# Patient Record
Sex: MALE | Race: WHITE | NOT HISPANIC OR LATINO | URBAN - METROPOLITAN AREA
[De-identification: names, ages, dates, MRNs, and addresses within clinical notes are randomized per-mention and may not be internally consistent; named-entity substitution may affect disease eponyms.]

---

## 2017-12-15 ENCOUNTER — HOSPITAL ENCOUNTER (OUTPATIENT)
Dept: FAMILY MEDICINE CLINIC | Facility: CLINIC | Age: 67
Setting detail: SPECIMEN
Discharge: HOME | End: 2017-12-15
Attending: INTERNAL MEDICINE | Admitting: INTERNAL MEDICINE

## 2017-12-15 LAB
ALBUMIN SERPL-MCNC: 4 G/DL (ref 3.5–4.8)
ALBUMIN/GLOB SERPL: 1.3 {RATIO} (ref 1–1.7)
ALP SERPL-CCNC: 93 IU/L (ref 32–91)
ALT SERPL-CCNC: 19 IU/L (ref 17–63)
ANION GAP SERPL CALC-SCNC: 12.6 MMOL/L (ref 10–20)
AST SERPL-CCNC: 25 IU/L (ref 15–41)
BASOPHILS # BLD AUTO: 0 10*3/UL (ref 0–0.2)
BASOPHILS NFR BLD AUTO: 1 % (ref 0–2)
BILIRUB SERPL-MCNC: 0.6 MG/DL (ref 0.3–1.2)
BUN SERPL-MCNC: 16 MG/DL (ref 8–20)
BUN/CREAT SERPL: 12.3 (ref 6.2–20.3)
CALCIUM SERPL-MCNC: 9.7 MG/DL (ref 8.9–10.3)
CHLORIDE SERPL-SCNC: 98 MMOL/L (ref 101–111)
CHOLEST SERPL-MCNC: 201 MG/DL
CHOLEST/HDLC SERPL: 4.1 {RATIO}
CONV CO2: 29 MMOL/L (ref 22–32)
CONV LDL CHOLESTEROL DIRECT: 137 MG/DL (ref 0–100)
CONV TOTAL PROTEIN: 7.1 G/DL (ref 6.1–7.9)
CREAT UR-MCNC: 1.3 MG/DL (ref 0.7–1.2)
DIFFERENTIAL METHOD BLD: (no result)
EOSINOPHIL # BLD AUTO: 0.1 10*3/UL (ref 0–0.3)
EOSINOPHIL # BLD AUTO: 1 % (ref 0–3)
ERYTHROCYTE [DISTWIDTH] IN BLOOD BY AUTOMATED COUNT: 13.4 % (ref 11.5–14.5)
GLOBULIN UR ELPH-MCNC: 3.1 G/DL (ref 2.5–3.8)
GLUCOSE SERPL-MCNC: 92 MG/DL (ref 65–99)
HCT VFR BLD AUTO: 48.9 % (ref 40–54)
HDLC SERPL-MCNC: 49 MG/DL
HGB BLD-MCNC: 16.4 G/DL (ref 14–18)
LDLC/HDLC SERPL: 2.8 {RATIO}
LIPID INTERPRETATION: ABNORMAL
LYMPHOCYTES # BLD AUTO: 0.8 10*3/UL (ref 0.8–4.8)
LYMPHOCYTES NFR BLD AUTO: 9 % (ref 18–42)
MCH RBC QN AUTO: 32.2 PG (ref 26–32)
MCHC RBC AUTO-ENTMCNC: 33.6 G/DL (ref 32–36)
MCV RBC AUTO: 95.7 FL (ref 80–94)
MONOCYTES # BLD AUTO: 0.5 10*3/UL (ref 0.1–1.3)
MONOCYTES NFR BLD AUTO: 6 % (ref 2–11)
NEUTROPHILS # BLD AUTO: 7.1 10*3/UL (ref 2.3–8.6)
NEUTROPHILS NFR BLD AUTO: 83 % (ref 50–75)
NRBC BLD AUTO-RTO: 0 /100{WBCS}
NRBC/RBC NFR BLD MANUAL: 0 10*3/UL
PLATELET # BLD AUTO: 214 10*3/UL (ref 150–450)
PMV BLD AUTO: 8.1 FL (ref 7.4–10.4)
POTASSIUM SERPL-SCNC: 4.6 MMOL/L (ref 3.6–5.1)
RBC # BLD AUTO: 5.1 10*6/UL (ref 4.6–6)
SODIUM SERPL-SCNC: 135 MMOL/L (ref 136–144)
TRIGL SERPL-MCNC: 87 MG/DL
TSH SERPL-ACNC: 0.5 UIU/ML (ref 0.34–5.6)
VLDLC SERPL CALC-MCNC: 15.5 MG/DL
WBC # BLD AUTO: 8.5 10*3/UL (ref 4.5–11.5)

## 2018-08-16 ENCOUNTER — ON CAMPUS - OUTPATIENT (AMBULATORY)
Dept: URBAN - METROPOLITAN AREA HOSPITAL 2 | Facility: HOSPITAL | Age: 68
End: 2018-08-16
Payer: COMMERCIAL

## 2018-08-16 ENCOUNTER — OFFICE (AMBULATORY)
Dept: URBAN - METROPOLITAN AREA PATHOLOGY 4 | Facility: PATHOLOGY | Age: 68
End: 2018-08-16
Payer: COMMERCIAL

## 2018-08-16 VITALS
HEART RATE: 61 BPM | SYSTOLIC BLOOD PRESSURE: 93 MMHG | OXYGEN SATURATION: 98 % | RESPIRATION RATE: 18 BRPM | SYSTOLIC BLOOD PRESSURE: 106 MMHG | SYSTOLIC BLOOD PRESSURE: 142 MMHG | OXYGEN SATURATION: 93 % | RESPIRATION RATE: 12 BRPM | SYSTOLIC BLOOD PRESSURE: 130 MMHG | HEART RATE: 65 BPM | OXYGEN SATURATION: 97 % | OXYGEN SATURATION: 96 % | TEMPERATURE: 97.1 F | HEART RATE: 64 BPM | RESPIRATION RATE: 15 BRPM | HEIGHT: 68 IN | SYSTOLIC BLOOD PRESSURE: 108 MMHG | DIASTOLIC BLOOD PRESSURE: 62 MMHG | DIASTOLIC BLOOD PRESSURE: 74 MMHG | SYSTOLIC BLOOD PRESSURE: 102 MMHG | DIASTOLIC BLOOD PRESSURE: 70 MMHG | WEIGHT: 216 LBS | DIASTOLIC BLOOD PRESSURE: 53 MMHG | DIASTOLIC BLOOD PRESSURE: 50 MMHG | HEART RATE: 63 BPM | HEART RATE: 58 BPM | SYSTOLIC BLOOD PRESSURE: 127 MMHG | DIASTOLIC BLOOD PRESSURE: 65 MMHG | RESPIRATION RATE: 16 BRPM | DIASTOLIC BLOOD PRESSURE: 86 MMHG | HEART RATE: 67 BPM

## 2018-08-16 DIAGNOSIS — K62.1 RECTAL POLYP: ICD-10-CM

## 2018-08-16 DIAGNOSIS — K64.0 FIRST DEGREE HEMORRHOIDS: ICD-10-CM

## 2018-08-16 DIAGNOSIS — D12.5 BENIGN NEOPLASM OF SIGMOID COLON: ICD-10-CM

## 2018-08-16 DIAGNOSIS — Z86.010 PERSONAL HISTORY OF COLONIC POLYPS: ICD-10-CM

## 2018-08-16 DIAGNOSIS — D12.2 BENIGN NEOPLASM OF ASCENDING COLON: ICD-10-CM

## 2018-08-16 DIAGNOSIS — K57.30 DIVERTICULOSIS OF LARGE INTESTINE WITHOUT PERFORATION OR ABS: ICD-10-CM

## 2018-08-16 DIAGNOSIS — D12.8 BENIGN NEOPLASM OF RECTUM: ICD-10-CM

## 2018-08-16 LAB
GI HISTOLOGY: A. UNSPECIFIED: (no result)
GI HISTOLOGY: B. SELECT: (no result)
GI HISTOLOGY: PDF REPORT: (no result)

## 2018-08-16 PROCEDURE — 45385 COLONOSCOPY W/LESION REMOVAL: CPT | Mod: PT | Performed by: INTERNAL MEDICINE

## 2018-08-16 PROCEDURE — 88305 TISSUE EXAM BY PATHOLOGIST: CPT | Mod: 26 | Performed by: INTERNAL MEDICINE

## 2018-08-16 PROCEDURE — 45380 COLONOSCOPY AND BIOPSY: CPT | Mod: 59,PT | Performed by: INTERNAL MEDICINE

## 2019-07-30 RX ORDER — ERGOCALCIFEROL 1.25 MG/1
CAPSULE ORAL
Qty: 12 CAPSULE | Refills: 1 | Status: SHIPPED | OUTPATIENT
Start: 2019-07-30 | End: 2019-11-01 | Stop reason: SDUPTHER

## 2019-08-26 RX ORDER — LISINOPRIL AND HYDROCHLOROTHIAZIDE 20; 12.5 MG/1; MG/1
TABLET ORAL
Qty: 30 TABLET | Refills: 0 | Status: SHIPPED | OUTPATIENT
Start: 2019-08-26 | End: 2019-09-26 | Stop reason: SDUPTHER

## 2019-08-26 RX ORDER — BUPROPION HYDROCHLORIDE 150 MG/1
TABLET ORAL
Qty: 30 TABLET | Refills: 0 | Status: SHIPPED | OUTPATIENT
Start: 2019-08-26 | End: 2019-09-26 | Stop reason: SDUPTHER

## 2019-09-27 RX ORDER — BUPROPION HYDROCHLORIDE 150 MG/1
TABLET ORAL
Qty: 30 TABLET | Refills: 0 | Status: SHIPPED | OUTPATIENT
Start: 2019-09-27 | End: 2019-11-01 | Stop reason: SDUPTHER

## 2019-09-27 RX ORDER — LISINOPRIL AND HYDROCHLOROTHIAZIDE 20; 12.5 MG/1; MG/1
TABLET ORAL
Qty: 30 TABLET | Refills: 0 | Status: SHIPPED | OUTPATIENT
Start: 2019-09-27 | End: 2019-11-02 | Stop reason: SDUPTHER

## 2019-11-01 RX ORDER — BUPROPION HYDROCHLORIDE 150 MG/1
TABLET ORAL
Qty: 30 TABLET | Refills: 0 | Status: SHIPPED | OUTPATIENT
Start: 2019-11-01 | End: 2019-12-05 | Stop reason: SDUPTHER

## 2019-11-01 RX ORDER — ERGOCALCIFEROL 1.25 MG/1
CAPSULE ORAL
Qty: 12 CAPSULE | Refills: 0 | Status: SHIPPED | OUTPATIENT
Start: 2019-11-01 | End: 2020-02-20

## 2019-11-04 RX ORDER — LISINOPRIL AND HYDROCHLOROTHIAZIDE 20; 12.5 MG/1; MG/1
TABLET ORAL
Qty: 30 TABLET | Refills: 0 | Status: SHIPPED | OUTPATIENT
Start: 2019-11-04 | End: 2019-12-05 | Stop reason: SDUPTHER

## 2019-12-05 RX ORDER — LISINOPRIL AND HYDROCHLOROTHIAZIDE 20; 12.5 MG/1; MG/1
TABLET ORAL
Qty: 30 TABLET | Refills: 0 | Status: SHIPPED | OUTPATIENT
Start: 2019-12-05 | End: 2020-01-06

## 2019-12-05 RX ORDER — BUPROPION HYDROCHLORIDE 150 MG/1
TABLET ORAL
Qty: 30 TABLET | Refills: 0 | Status: SHIPPED | OUTPATIENT
Start: 2019-12-05 | End: 2020-01-06

## 2020-01-06 RX ORDER — LISINOPRIL AND HYDROCHLOROTHIAZIDE 20; 12.5 MG/1; MG/1
TABLET ORAL
Qty: 30 TABLET | Refills: 0 | Status: SHIPPED | OUTPATIENT
Start: 2020-01-06 | End: 2020-02-24

## 2020-01-06 RX ORDER — BUPROPION HYDROCHLORIDE 150 MG/1
TABLET ORAL
Qty: 30 TABLET | Refills: 0 | Status: SHIPPED | OUTPATIENT
Start: 2020-01-06 | End: 2020-02-24

## 2020-02-20 RX ORDER — ERGOCALCIFEROL 1.25 MG/1
CAPSULE ORAL
Qty: 12 CAPSULE | Refills: 0 | Status: SHIPPED | OUTPATIENT
Start: 2020-02-20

## 2020-02-20 RX ORDER — LISINOPRIL AND HYDROCHLOROTHIAZIDE 20; 12.5 MG/1; MG/1
TABLET ORAL
Qty: 30 TABLET | Refills: 0 | OUTPATIENT
Start: 2020-02-20

## 2020-02-20 RX ORDER — BUPROPION HYDROCHLORIDE 150 MG/1
TABLET ORAL
Qty: 30 TABLET | Refills: 0 | OUTPATIENT
Start: 2020-02-20

## 2020-02-24 RX ORDER — BUPROPION HYDROCHLORIDE 150 MG/1
TABLET ORAL
Qty: 30 TABLET | Refills: 0 | Status: SHIPPED | OUTPATIENT
Start: 2020-02-24 | End: 2020-04-09

## 2020-02-24 RX ORDER — LISINOPRIL AND HYDROCHLOROTHIAZIDE 20; 12.5 MG/1; MG/1
TABLET ORAL
Qty: 30 TABLET | Refills: 0 | Status: SHIPPED | OUTPATIENT
Start: 2020-02-24 | End: 2020-04-09

## 2020-04-09 RX ORDER — LISINOPRIL AND HYDROCHLOROTHIAZIDE 20; 12.5 MG/1; MG/1
TABLET ORAL
Qty: 30 TABLET | Refills: 0 | Status: SHIPPED | OUTPATIENT
Start: 2020-04-09 | End: 2020-05-12

## 2020-04-09 RX ORDER — BUPROPION HYDROCHLORIDE 150 MG/1
TABLET ORAL
Qty: 30 TABLET | Refills: 0 | Status: SHIPPED | OUTPATIENT
Start: 2020-04-09 | End: 2020-05-12

## 2020-05-12 RX ORDER — LISINOPRIL AND HYDROCHLOROTHIAZIDE 20; 12.5 MG/1; MG/1
TABLET ORAL
Qty: 30 TABLET | Refills: 0 | Status: SHIPPED | OUTPATIENT
Start: 2020-05-12 | End: 2020-06-15

## 2020-05-12 RX ORDER — BUPROPION HYDROCHLORIDE 150 MG/1
TABLET ORAL
Qty: 30 TABLET | Refills: 0 | Status: SHIPPED | OUTPATIENT
Start: 2020-05-12 | End: 2020-06-15

## 2020-06-15 RX ORDER — LISINOPRIL AND HYDROCHLOROTHIAZIDE 20; 12.5 MG/1; MG/1
TABLET ORAL
Qty: 30 TABLET | Refills: 0 | Status: SHIPPED | OUTPATIENT
Start: 2020-06-15 | End: 2020-07-31

## 2020-06-15 RX ORDER — BUPROPION HYDROCHLORIDE 150 MG/1
TABLET ORAL
Qty: 30 TABLET | Refills: 0 | Status: SHIPPED | OUTPATIENT
Start: 2020-06-15 | End: 2020-08-03

## 2020-07-31 RX ORDER — LISINOPRIL AND HYDROCHLOROTHIAZIDE 20; 12.5 MG/1; MG/1
TABLET ORAL
Qty: 30 TABLET | Refills: 0 | Status: SHIPPED | OUTPATIENT
Start: 2020-07-31 | End: 2020-08-26

## 2020-08-03 RX ORDER — BUPROPION HYDROCHLORIDE 150 MG/1
TABLET ORAL
Qty: 30 TABLET | Refills: 6 | Status: SHIPPED | OUTPATIENT
Start: 2020-08-03 | End: 2021-03-04

## 2020-08-26 RX ORDER — LISINOPRIL AND HYDROCHLOROTHIAZIDE 20; 12.5 MG/1; MG/1
TABLET ORAL
Qty: 30 TABLET | Refills: 0 | Status: SHIPPED | OUTPATIENT
Start: 2020-08-26 | End: 2020-09-21

## 2020-08-26 NOTE — TELEPHONE ENCOUNTER
Gave message to patient at 4:05pm and he said he would call back sometime to schedule an appointment.

## 2020-09-21 RX ORDER — LISINOPRIL AND HYDROCHLOROTHIAZIDE 20; 12.5 MG/1; MG/1
TABLET ORAL
Qty: 90 TABLET | Refills: 1 | Status: SHIPPED | OUTPATIENT
Start: 2020-09-21 | End: 2021-03-24

## 2021-03-04 RX ORDER — BUPROPION HYDROCHLORIDE 150 MG/1
TABLET ORAL
Qty: 30 TABLET | Refills: 5 | Status: SHIPPED | OUTPATIENT
Start: 2021-03-04 | End: 2021-09-06

## 2021-03-24 RX ORDER — LISINOPRIL AND HYDROCHLOROTHIAZIDE 20; 12.5 MG/1; MG/1
TABLET ORAL
Qty: 30 TABLET | Refills: 0 | Status: SHIPPED | OUTPATIENT
Start: 2021-03-24 | End: 2021-04-19

## 2021-04-19 RX ORDER — LISINOPRIL AND HYDROCHLOROTHIAZIDE 20; 12.5 MG/1; MG/1
TABLET ORAL
Qty: 30 TABLET | Refills: 0 | Status: SHIPPED | OUTPATIENT
Start: 2021-04-19 | End: 2021-05-19

## 2021-05-19 RX ORDER — LISINOPRIL AND HYDROCHLOROTHIAZIDE 20; 12.5 MG/1; MG/1
TABLET ORAL
Qty: 30 TABLET | Refills: 0 | Status: SHIPPED | OUTPATIENT
Start: 2021-05-19 | End: 2021-06-14

## 2021-06-14 RX ORDER — LISINOPRIL AND HYDROCHLOROTHIAZIDE 20; 12.5 MG/1; MG/1
TABLET ORAL
Qty: 30 TABLET | Refills: 4 | Status: SHIPPED | OUTPATIENT
Start: 2021-06-14 | End: 2022-01-31

## 2021-09-06 RX ORDER — BUPROPION HYDROCHLORIDE 150 MG/1
TABLET ORAL
Qty: 30 TABLET | Refills: 5 | Status: SHIPPED | OUTPATIENT
Start: 2021-09-06 | End: 2022-06-22

## 2022-01-31 RX ORDER — LISINOPRIL AND HYDROCHLOROTHIAZIDE 20; 12.5 MG/1; MG/1
TABLET ORAL
Qty: 30 TABLET | Refills: 4 | Status: SHIPPED | OUTPATIENT
Start: 2022-01-31 | End: 2022-07-27

## 2022-06-22 RX ORDER — BUPROPION HYDROCHLORIDE 150 MG/1
TABLET ORAL
Qty: 30 TABLET | Refills: 5 | Status: SHIPPED | OUTPATIENT
Start: 2022-06-22

## 2022-07-27 RX ORDER — LISINOPRIL AND HYDROCHLOROTHIAZIDE 20; 12.5 MG/1; MG/1
TABLET ORAL
Qty: 30 TABLET | Refills: 4 | Status: SHIPPED | OUTPATIENT
Start: 2022-07-27

## 2023-11-08 RX ORDER — BUPROPION HYDROCHLORIDE 150 MG/1
TABLET ORAL
Qty: 30 TABLET | Refills: 5 | OUTPATIENT
Start: 2023-11-08

## 2023-11-08 RX ORDER — LISINOPRIL AND HYDROCHLOROTHIAZIDE 20; 12.5 MG/1; MG/1
TABLET ORAL
Qty: 30 TABLET | Refills: 4 | OUTPATIENT
Start: 2023-11-08

## 2024-11-15 ENCOUNTER — APPOINTMENT (OUTPATIENT)
Dept: CT IMAGING | Facility: HOSPITAL | Age: 74
End: 2024-11-15
Payer: MEDICARE

## 2024-11-15 ENCOUNTER — HOSPITAL ENCOUNTER (INPATIENT)
Facility: HOSPITAL | Age: 74
LOS: 8 days | Discharge: NURSING, REHAB OR LONG-TERM CARE | End: 2024-11-23
Attending: EMERGENCY MEDICINE | Admitting: INTERNAL MEDICINE
Payer: MEDICARE

## 2024-11-15 ENCOUNTER — APPOINTMENT (OUTPATIENT)
Dept: CARDIOLOGY | Facility: HOSPITAL | Age: 74
End: 2024-11-15
Payer: MEDICARE

## 2024-11-15 DIAGNOSIS — L03.116 BILATERAL LOWER LEG CELLULITIS: Primary | ICD-10-CM

## 2024-11-15 DIAGNOSIS — B35.1 ONYCHOMYCOSIS: ICD-10-CM

## 2024-11-15 DIAGNOSIS — L03.115 BILATERAL LOWER LEG CELLULITIS: Primary | ICD-10-CM

## 2024-11-15 DIAGNOSIS — I50.31 DIASTOLIC CHF, ACUTE: ICD-10-CM

## 2024-11-15 PROBLEM — L03.90 CELLULITIS: Status: ACTIVE | Noted: 2024-11-15

## 2024-11-15 LAB
ALBUMIN SERPL-MCNC: 3.8 G/DL (ref 3.5–5.2)
ALBUMIN/GLOB SERPL: 1.2 G/DL
ALP SERPL-CCNC: 98 U/L (ref 39–117)
ALT SERPL W P-5'-P-CCNC: 22 U/L (ref 1–41)
ANION GAP SERPL CALCULATED.3IONS-SCNC: 9.1 MMOL/L (ref 5–15)
AST SERPL-CCNC: 26 U/L (ref 1–40)
BACTERIA UR QL AUTO: ABNORMAL /HPF
BASOPHILS # BLD AUTO: 0.05 10*3/MM3 (ref 0–0.2)
BASOPHILS NFR BLD AUTO: 0.4 % (ref 0–1.5)
BH CV LOWER VASCULAR LEFT COMMON FEMORAL AUGMENT: NORMAL
BH CV LOWER VASCULAR LEFT COMMON FEMORAL COMPETENT: NORMAL
BH CV LOWER VASCULAR LEFT COMMON FEMORAL COMPRESS: NORMAL
BH CV LOWER VASCULAR LEFT COMMON FEMORAL PHASIC: NORMAL
BH CV LOWER VASCULAR LEFT COMMON FEMORAL SPONT: NORMAL
BH CV LOWER VASCULAR LEFT DISTAL FEMORAL COMPRESS: NORMAL
BH CV LOWER VASCULAR LEFT GASTRONEMIUS COMPRESS: NORMAL
BH CV LOWER VASCULAR LEFT GREATER SAPH AK COMPRESS: NORMAL
BH CV LOWER VASCULAR LEFT GREATER SAPH BK COMPRESS: NORMAL
BH CV LOWER VASCULAR LEFT MID FEMORAL AUGMENT: NORMAL
BH CV LOWER VASCULAR LEFT MID FEMORAL COMPETENT: NORMAL
BH CV LOWER VASCULAR LEFT MID FEMORAL COMPRESS: NORMAL
BH CV LOWER VASCULAR LEFT MID FEMORAL PHASIC: NORMAL
BH CV LOWER VASCULAR LEFT MID FEMORAL SPONT: NORMAL
BH CV LOWER VASCULAR LEFT POPLITEAL AUGMENT: NORMAL
BH CV LOWER VASCULAR LEFT POPLITEAL COMPETENT: NORMAL
BH CV LOWER VASCULAR LEFT POPLITEAL COMPRESS: NORMAL
BH CV LOWER VASCULAR LEFT POPLITEAL PHASIC: NORMAL
BH CV LOWER VASCULAR LEFT POPLITEAL SPONT: NORMAL
BH CV LOWER VASCULAR LEFT POSTERIOR TIBIAL COMPRESS: NORMAL
BH CV LOWER VASCULAR LEFT PROXIMAL FEMORAL COMPRESS: NORMAL
BH CV LOWER VASCULAR LEFT SAPHENOFEMORAL JUNCTION COMPRESS: NORMAL
BH CV LOWER VASCULAR RIGHT COMMON FEMORAL AUGMENT: NORMAL
BH CV LOWER VASCULAR RIGHT COMMON FEMORAL COMPETENT: NORMAL
BH CV LOWER VASCULAR RIGHT COMMON FEMORAL COMPRESS: NORMAL
BH CV LOWER VASCULAR RIGHT COMMON FEMORAL PHASIC: NORMAL
BH CV LOWER VASCULAR RIGHT COMMON FEMORAL SPONT: NORMAL
BH CV LOWER VASCULAR RIGHT DISTAL FEMORAL COMPRESS: NORMAL
BH CV LOWER VASCULAR RIGHT GASTRONEMIUS COMPRESS: NORMAL
BH CV LOWER VASCULAR RIGHT GREATER SAPH AK COMPRESS: NORMAL
BH CV LOWER VASCULAR RIGHT GREATER SAPH BK COMPRESS: NORMAL
BH CV LOWER VASCULAR RIGHT LESSER SAPH COMPRESS: NORMAL
BH CV LOWER VASCULAR RIGHT MID FEMORAL AUGMENT: NORMAL
BH CV LOWER VASCULAR RIGHT MID FEMORAL COMPETENT: NORMAL
BH CV LOWER VASCULAR RIGHT MID FEMORAL COMPRESS: NORMAL
BH CV LOWER VASCULAR RIGHT MID FEMORAL PHASIC: NORMAL
BH CV LOWER VASCULAR RIGHT MID FEMORAL SPONT: NORMAL
BH CV LOWER VASCULAR RIGHT POPLITEAL AUGMENT: NORMAL
BH CV LOWER VASCULAR RIGHT POPLITEAL COMPETENT: NORMAL
BH CV LOWER VASCULAR RIGHT POPLITEAL COMPRESS: NORMAL
BH CV LOWER VASCULAR RIGHT POPLITEAL PHASIC: NORMAL
BH CV LOWER VASCULAR RIGHT POPLITEAL SPONT: NORMAL
BH CV LOWER VASCULAR RIGHT POSTERIOR TIBIAL COMPRESS: NORMAL
BH CV LOWER VASCULAR RIGHT PROXIMAL FEMORAL COMPRESS: NORMAL
BH CV LOWER VASCULAR RIGHT SAPHENOFEMORAL JUNCTION COMPRESS: NORMAL
BILIRUB SERPL-MCNC: 0.4 MG/DL (ref 0–1.2)
BILIRUB UR QL STRIP: NEGATIVE
BUN SERPL-MCNC: 24 MG/DL (ref 8–23)
BUN/CREAT SERPL: 21.4 (ref 7–25)
CALCIUM SPEC-SCNC: 9.4 MG/DL (ref 8.6–10.5)
CHLORIDE SERPL-SCNC: 101 MMOL/L (ref 98–107)
CK SERPL-CCNC: 212 U/L (ref 20–200)
CLARITY UR: ABNORMAL
CO2 SERPL-SCNC: 27.9 MMOL/L (ref 22–29)
COLOR UR: YELLOW
CREAT SERPL-MCNC: 1.12 MG/DL (ref 0.76–1.27)
CRP SERPL-MCNC: 10.3 MG/DL (ref 0–0.5)
DEPRECATED RDW RBC AUTO: 43.2 FL (ref 37–54)
EGFRCR SERPLBLD CKD-EPI 2021: 68.1 ML/MIN/1.73
EOSINOPHIL # BLD AUTO: 0.17 10*3/MM3 (ref 0–0.4)
EOSINOPHIL NFR BLD AUTO: 1.4 % (ref 0.3–6.2)
ERYTHROCYTE [DISTWIDTH] IN BLOOD BY AUTOMATED COUNT: 12.3 % (ref 12.3–15.4)
GLOBULIN UR ELPH-MCNC: 3.2 GM/DL
GLUCOSE SERPL-MCNC: 87 MG/DL (ref 65–99)
GLUCOSE UR STRIP-MCNC: NEGATIVE MG/DL
HCT VFR BLD AUTO: 41.2 % (ref 37.5–51)
HGB BLD-MCNC: 13.5 G/DL (ref 13–17.7)
HGB UR QL STRIP.AUTO: ABNORMAL
HOLD SPECIMEN: NORMAL
HOLD SPECIMEN: NORMAL
HYALINE CASTS UR QL AUTO: ABNORMAL /LPF
IMM GRANULOCYTES # BLD AUTO: 0.1 10*3/MM3 (ref 0–0.05)
IMM GRANULOCYTES NFR BLD AUTO: 0.9 % (ref 0–0.5)
KETONES UR QL STRIP: ABNORMAL
LEUKOCYTE ESTERASE UR QL STRIP.AUTO: ABNORMAL
LYMPHOCYTES # BLD AUTO: 0.61 10*3/MM3 (ref 0.7–3.1)
LYMPHOCYTES NFR BLD AUTO: 5.2 % (ref 19.6–45.3)
MCH RBC QN AUTO: 31 PG (ref 26.6–33)
MCHC RBC AUTO-ENTMCNC: 32.8 G/DL (ref 31.5–35.7)
MCV RBC AUTO: 94.7 FL (ref 79–97)
MONOCYTES # BLD AUTO: 0.88 10*3/MM3 (ref 0.1–0.9)
MONOCYTES NFR BLD AUTO: 7.5 % (ref 5–12)
MRSA DNA SPEC QL NAA+PROBE: NORMAL
NEUTROPHILS NFR BLD AUTO: 84.6 % (ref 42.7–76)
NEUTROPHILS NFR BLD AUTO: 9.93 10*3/MM3 (ref 1.7–7)
NITRITE UR QL STRIP: NEGATIVE
NRBC BLD AUTO-RTO: 0 /100 WBC (ref 0–0.2)
PH UR STRIP.AUTO: 7 [PH] (ref 5–8)
PLATELET # BLD AUTO: 352 10*3/MM3 (ref 140–450)
PMV BLD AUTO: 8.8 FL (ref 6–12)
POTASSIUM SERPL-SCNC: 4.1 MMOL/L (ref 3.5–5.2)
PROCALCITONIN SERPL-MCNC: 0.07 NG/ML (ref 0–0.25)
PROT SERPL-MCNC: 7 G/DL (ref 6–8.5)
PROT UR QL STRIP: NEGATIVE
RBC # BLD AUTO: 4.35 10*6/MM3 (ref 4.14–5.8)
RBC # UR STRIP: ABNORMAL /HPF
REF LAB TEST METHOD: ABNORMAL
SODIUM SERPL-SCNC: 138 MMOL/L (ref 136–145)
SP GR UR STRIP: 1.04 (ref 1–1.03)
SQUAMOUS #/AREA URNS HPF: ABNORMAL /HPF
T4 FREE SERPL-MCNC: 1.43 NG/DL (ref 0.93–1.7)
TSH SERPL DL<=0.05 MIU/L-ACNC: 0.62 UIU/ML (ref 0.27–4.2)
UROBILINOGEN UR QL STRIP: ABNORMAL
WBC # UR STRIP: ABNORMAL /HPF
WBC NRBC COR # BLD AUTO: 11.74 10*3/MM3 (ref 3.4–10.8)
WHOLE BLOOD HOLD COAG: NORMAL
WHOLE BLOOD HOLD SPECIMEN: NORMAL

## 2024-11-15 PROCEDURE — 25010000002 CEFTRIAXONE PER 250 MG: Performed by: EMERGENCY MEDICINE

## 2024-11-15 PROCEDURE — 36415 COLL VENOUS BLD VENIPUNCTURE: CPT

## 2024-11-15 PROCEDURE — 87040 BLOOD CULTURE FOR BACTERIA: CPT | Performed by: EMERGENCY MEDICINE

## 2024-11-15 PROCEDURE — 99285 EMERGENCY DEPT VISIT HI MDM: CPT

## 2024-11-15 PROCEDURE — 25010000002 VANCOMYCIN 2-0.9 GM/500ML-% SOLUTION: Performed by: INTERNAL MEDICINE

## 2024-11-15 PROCEDURE — 80053 COMPREHEN METABOLIC PANEL: CPT | Performed by: EMERGENCY MEDICINE

## 2024-11-15 PROCEDURE — 93970 EXTREMITY STUDY: CPT

## 2024-11-15 PROCEDURE — 85025 COMPLETE CBC W/AUTO DIFF WBC: CPT | Performed by: EMERGENCY MEDICINE

## 2024-11-15 PROCEDURE — 87086 URINE CULTURE/COLONY COUNT: CPT | Performed by: INTERNAL MEDICINE

## 2024-11-15 PROCEDURE — 81001 URINALYSIS AUTO W/SCOPE: CPT | Performed by: INTERNAL MEDICINE

## 2024-11-15 PROCEDURE — 84443 ASSAY THYROID STIM HORMONE: CPT | Performed by: EMERGENCY MEDICINE

## 2024-11-15 PROCEDURE — 25010000002 HEPARIN (PORCINE) PER 1000 UNITS: Performed by: INTERNAL MEDICINE

## 2024-11-15 PROCEDURE — 84439 ASSAY OF FREE THYROXINE: CPT | Performed by: EMERGENCY MEDICINE

## 2024-11-15 PROCEDURE — 86140 C-REACTIVE PROTEIN: CPT | Performed by: INTERNAL MEDICINE

## 2024-11-15 PROCEDURE — 25510000001 IOPAMIDOL PER 1 ML: Performed by: INTERNAL MEDICINE

## 2024-11-15 PROCEDURE — 82550 ASSAY OF CK (CPK): CPT | Performed by: EMERGENCY MEDICINE

## 2024-11-15 PROCEDURE — 93970 EXTREMITY STUDY: CPT | Performed by: SURGERY

## 2024-11-15 PROCEDURE — 84145 PROCALCITONIN (PCT): CPT | Performed by: INTERNAL MEDICINE

## 2024-11-15 PROCEDURE — 87641 MR-STAPH DNA AMP PROBE: CPT | Performed by: INTERNAL MEDICINE

## 2024-11-15 PROCEDURE — 73701 CT LOWER EXTREMITY W/DYE: CPT

## 2024-11-15 RX ORDER — ALPRAZOLAM 0.25 MG/1
0.25 TABLET ORAL 2 TIMES DAILY PRN
Status: DISPENSED | OUTPATIENT
Start: 2024-11-15 | End: 2024-11-20

## 2024-11-15 RX ORDER — ONDANSETRON 4 MG/1
4 TABLET, ORALLY DISINTEGRATING ORAL EVERY 6 HOURS PRN
Status: DISCONTINUED | OUTPATIENT
Start: 2024-11-15 | End: 2024-11-23 | Stop reason: HOSPADM

## 2024-11-15 RX ORDER — ONDANSETRON 2 MG/ML
4 INJECTION INTRAMUSCULAR; INTRAVENOUS EVERY 6 HOURS PRN
Status: DISCONTINUED | OUTPATIENT
Start: 2024-11-15 | End: 2024-11-23 | Stop reason: HOSPADM

## 2024-11-15 RX ORDER — HEPARIN SODIUM 5000 [USP'U]/ML
5000 INJECTION, SOLUTION INTRAVENOUS; SUBCUTANEOUS EVERY 12 HOURS SCHEDULED
Status: DISCONTINUED | OUTPATIENT
Start: 2024-11-15 | End: 2024-11-23 | Stop reason: HOSPADM

## 2024-11-15 RX ORDER — SODIUM CHLORIDE 9 MG/ML
75 INJECTION, SOLUTION INTRAVENOUS CONTINUOUS
Status: DISCONTINUED | OUTPATIENT
Start: 2024-11-15 | End: 2024-11-16

## 2024-11-15 RX ORDER — ALUMINA, MAGNESIA, AND SIMETHICONE 2400; 2400; 240 MG/30ML; MG/30ML; MG/30ML
15 SUSPENSION ORAL EVERY 6 HOURS PRN
Status: DISCONTINUED | OUTPATIENT
Start: 2024-11-15 | End: 2024-11-23 | Stop reason: HOSPADM

## 2024-11-15 RX ORDER — TRAZODONE HYDROCHLORIDE 50 MG/1
50 TABLET, FILM COATED ORAL NIGHTLY PRN
Status: DISCONTINUED | OUTPATIENT
Start: 2024-11-15 | End: 2024-11-23 | Stop reason: HOSPADM

## 2024-11-15 RX ORDER — IOPAMIDOL 755 MG/ML
100 INJECTION, SOLUTION INTRAVASCULAR
Status: COMPLETED | OUTPATIENT
Start: 2024-11-15 | End: 2024-11-15

## 2024-11-15 RX ORDER — POLYETHYLENE GLYCOL 3350 17 G/17G
17 POWDER, FOR SOLUTION ORAL DAILY PRN
Status: DISCONTINUED | OUTPATIENT
Start: 2024-11-15 | End: 2024-11-23 | Stop reason: HOSPADM

## 2024-11-15 RX ORDER — SODIUM CHLORIDE 9 MG/ML
40 INJECTION, SOLUTION INTRAVENOUS AS NEEDED
Status: DISCONTINUED | OUTPATIENT
Start: 2024-11-15 | End: 2024-11-23 | Stop reason: HOSPADM

## 2024-11-15 RX ORDER — SODIUM CHLORIDE 0.9 % (FLUSH) 0.9 %
10 SYRINGE (ML) INJECTION AS NEEDED
Status: DISCONTINUED | OUTPATIENT
Start: 2024-11-15 | End: 2024-11-23 | Stop reason: HOSPADM

## 2024-11-15 RX ORDER — HYDROCODONE BITARTRATE AND ACETAMINOPHEN 5; 325 MG/1; MG/1
1 TABLET ORAL EVERY 6 HOURS PRN
Status: DISCONTINUED | OUTPATIENT
Start: 2024-11-15 | End: 2024-11-15

## 2024-11-15 RX ORDER — AMOXICILLIN 250 MG
2 CAPSULE ORAL 2 TIMES DAILY PRN
Status: DISCONTINUED | OUTPATIENT
Start: 2024-11-15 | End: 2024-11-23 | Stop reason: HOSPADM

## 2024-11-15 RX ORDER — ACETAMINOPHEN 650 MG/1
650 SUPPOSITORY RECTAL EVERY 4 HOURS PRN
Status: DISCONTINUED | OUTPATIENT
Start: 2024-11-15 | End: 2024-11-23 | Stop reason: HOSPADM

## 2024-11-15 RX ORDER — NITROGLYCERIN 0.4 MG/1
0.4 TABLET SUBLINGUAL
Status: DISCONTINUED | OUTPATIENT
Start: 2024-11-15 | End: 2024-11-23 | Stop reason: HOSPADM

## 2024-11-15 RX ORDER — SODIUM CHLORIDE 0.9 % (FLUSH) 0.9 %
10 SYRINGE (ML) INJECTION EVERY 12 HOURS SCHEDULED
Status: DISCONTINUED | OUTPATIENT
Start: 2024-11-15 | End: 2024-11-23 | Stop reason: HOSPADM

## 2024-11-15 RX ORDER — HYDRALAZINE HYDROCHLORIDE 20 MG/ML
10 INJECTION INTRAMUSCULAR; INTRAVENOUS EVERY 6 HOURS PRN
Status: DISCONTINUED | OUTPATIENT
Start: 2024-11-15 | End: 2024-11-16

## 2024-11-15 RX ORDER — BISACODYL 10 MG
10 SUPPOSITORY, RECTAL RECTAL DAILY PRN
Status: DISCONTINUED | OUTPATIENT
Start: 2024-11-15 | End: 2024-11-23 | Stop reason: HOSPADM

## 2024-11-15 RX ORDER — HYDROCODONE BITARTRATE AND ACETAMINOPHEN 5; 325 MG/1; MG/1
1 TABLET ORAL EVERY 6 HOURS PRN
Status: COMPLETED | OUTPATIENT
Start: 2024-11-15 | End: 2024-11-15

## 2024-11-15 RX ORDER — BISACODYL 5 MG/1
5 TABLET, DELAYED RELEASE ORAL DAILY PRN
Status: DISCONTINUED | OUTPATIENT
Start: 2024-11-15 | End: 2024-11-23 | Stop reason: HOSPADM

## 2024-11-15 RX ORDER — ACETAMINOPHEN 160 MG/5ML
650 SOLUTION ORAL EVERY 4 HOURS PRN
Status: DISCONTINUED | OUTPATIENT
Start: 2024-11-15 | End: 2024-11-23 | Stop reason: HOSPADM

## 2024-11-15 RX ORDER — VANCOMYCIN/0.9 % SOD CHLORIDE 1.5G/250ML
15 PLASTIC BAG, INJECTION (ML) INTRAVENOUS EVERY 24 HOURS
Status: DISCONTINUED | OUTPATIENT
Start: 2024-11-16 | End: 2024-11-16

## 2024-11-15 RX ORDER — AMLODIPINE BESYLATE 5 MG/1
10 TABLET ORAL
Status: DISCONTINUED | OUTPATIENT
Start: 2024-11-15 | End: 2024-11-16

## 2024-11-15 RX ORDER — IOPAMIDOL 755 MG/ML
100 INJECTION, SOLUTION INTRAVASCULAR
Status: DISCONTINUED | OUTPATIENT
Start: 2024-11-15 | End: 2024-11-23 | Stop reason: HOSPADM

## 2024-11-15 RX ORDER — ACETAMINOPHEN 325 MG/1
650 TABLET ORAL EVERY 4 HOURS PRN
Status: DISCONTINUED | OUTPATIENT
Start: 2024-11-15 | End: 2024-11-23 | Stop reason: HOSPADM

## 2024-11-15 RX ORDER — VANCOMYCIN 2 GRAM/500 ML IN 0.9 % SODIUM CHLORIDE INTRAVENOUS
20 ONCE
Status: COMPLETED | OUTPATIENT
Start: 2024-11-15 | End: 2024-11-15

## 2024-11-15 RX ORDER — HYDROCODONE BITARTRATE AND ACETAMINOPHEN 10; 325 MG/1; MG/1
1 TABLET ORAL EVERY 6 HOURS PRN
Status: DISCONTINUED | OUTPATIENT
Start: 2024-11-15 | End: 2024-11-16

## 2024-11-15 RX ADMIN — HYDROCODONE BITARTRATE AND ACETAMINOPHEN 1 TABLET: 5; 325 TABLET ORAL at 14:37

## 2024-11-15 RX ADMIN — Medication 10 ML: at 21:13

## 2024-11-15 RX ADMIN — ALPRAZOLAM 0.25 MG: 0.25 TABLET ORAL at 17:52

## 2024-11-15 RX ADMIN — CEFTRIAXONE 2000 MG: 2 INJECTION, POWDER, FOR SOLUTION INTRAMUSCULAR; INTRAVENOUS at 14:22

## 2024-11-15 RX ADMIN — HEPARIN SODIUM 5000 UNITS: 5000 INJECTION INTRAVENOUS; SUBCUTANEOUS at 21:12

## 2024-11-15 RX ADMIN — IOPAMIDOL 100 ML: 755 INJECTION, SOLUTION INTRAVENOUS at 17:30

## 2024-11-15 RX ADMIN — TRAZODONE HYDROCHLORIDE 50 MG: 50 TABLET ORAL at 21:13

## 2024-11-15 RX ADMIN — HYDROCODONE BITARTRATE AND ACETAMINOPHEN 1 TABLET: 5; 325 TABLET ORAL at 16:34

## 2024-11-15 RX ADMIN — HYDROCODONE BITARTRATE AND ACETAMINOPHEN 1 TABLET: 5; 325 TABLET ORAL at 22:24

## 2024-11-15 RX ADMIN — Medication 2000 MG: at 18:57

## 2024-11-15 NOTE — H&P
History and Physical   Roger Nguyen : 1950 MRN:[id number] LOS:0     Reason for admission: Cellulitis     Assessment / Plan     #Bilateral lower extremity swelling concerning for cellulitis  -Patient presented with worsened bilateral lower extremity swelling pain and redness.  Per the patient the bilateral lower extremity swelling has been ongoing for a few years and it gradually worsened.  -CK increased to 212   -Inflammatory markers pending  -MRSA pending   -Bilateral venous Doppler CT lower extremity with contrast  -IV rocephin and van  -PT OT to see     #Hypertension  -Amlodipine 10 mg and prn med with parameters     #Social issue  -Will need PCP.  Will need blood pressure medication prescription on discharge.        Nutrition: Diet: Regular/House; Fluid Consistency: Thin (IDDSI 0)     DVT Prophylaxis: Active VTE Prophylaxis:  Pharmacologic:        Start     Dose Route Frequency Stop    11/15/24 2100  heparin (porcine) 5000 UNIT/ML injection 5,000 Units         5,000 Units SC Every 12 Hours Scheduled --                  Select Mechanical VTE Prophylaxis if Desired & Appropriate      History of Present illness     An extremely pleasant 74 y.o. old male patient with PMH of anxiety hypertension presents to the hospital with complaints of more than bilateral lower extremity swelling and pain.  Patient mentioned that the lower extremity swelling has been ongoing for a few years.  He did not pay attention to this as he was dedicated in taking care of his wife who just passed away.  Currently patient has mild leukocytosis of 11 CK of 212.  Inflammatory markers pending.  Venous Doppler and CT lower extremity pending.  Started on IV Rocephin.  MRSA swab pending.     Patient will be admitted for bilateral lower extremity cellulitis management.    Subjective / Review of systems     Review of Systems   Pain in the bilateral lower extremity  He is very sad given recent loss of his wife (  was just yesterday )      Past Medical/Surgical/Social/Family History & Allergies   No past medical history on file.   No past surgical history on file.   Social History     Socioeconomic History    Marital status:       No family history on file.   No Known Allergies     Home Medications     Prior to Admission medications    Medication Sig Start Date End Date Taking? Authorizing Provider   buPROPion XL (WELLBUTRIN XL) 150 MG 24 hr tablet TAKE ONE TABLET BY MOUTH DAILY 6/22/22   Juan Harding MD   lisinopril-hydrochlorothiazide (PRINZIDE,ZESTORETIC) 20-12.5 MG per tablet TAKE ONE TABLET BY MOUTH DAILY 7/27/22   Juan Harding MD   vitamin D (ERGOCALCIFEROL) 1.25 MG (60549 UT) capsule capsule TAKE ONE CAPSULE BY MOUTH ONCE WEEKLY 2/20/20   Valentina Andrade MD      Objective / Physical Exam   Vital signs:  Temp: 97.6 °F (36.4 °C)  BP: 176/98  Heart Rate: 90  Resp: 17  SpO2: 96 %  Weight: 93.8 kg (206 lb 12.7 oz)    Admission Weight: Weight: 93.8 kg (206 lb 12.7 oz)    Physical Exam   Physical Exam  HENT:      Head: Normocephalic and atraumatic.      Nose: Nose normal.   Eyes:      Extraocular Movements: Extraocular movements intact.      Conjunctivae/sclera: Conjunctivae normal.      Pupils: Pupils are equal, round, and reactive to light.   Cardiovascular:      Rate and Rhythm: normal       Pulses: Normal pulses.      Heart sounds: Normal heart sounds.   Pulmonary:      Effort: normal      Breath sounds: normal   Abdominal:      General: Abdomen is flat. Bowel sounds are normal.      Palpations: Abdomen is soft.   Musculoskeletal:         Bilateral lower extremity swelling erythematous skin changes tenderness  Neurological:      General: No focal deficit present.      Mental Status: alert.   Psychiatric:         Mood and Affect: very sad          Labs     Results from last 7 days   Lab Units 11/15/24  1208   WBC 10*3/mm3 11.74*   HEMATOCRIT % 41.2   PLATELETS 10*3/mm3 352      Results from last 7 days   Lab Units  11/15/24  1208   SODIUM mmol/L 138   POTASSIUM mmol/L 4.1   CHLORIDE mmol/L 101   CO2 mmol/L 27.9   BUN mg/dL 24*   CREATININE mg/dL 1.12        Current Medications   Scheduled Meds:[START ON 11/16/2024] cefTRIAXone, 2,000 mg, Intravenous, Daily  heparin (porcine), 5,000 Units, Subcutaneous, Q12H  sodium chloride, 10 mL, Intravenous, Q12H         Continuous Infusions:      Judd Andrew MD  [facility]   11/15/24   14:55 EST

## 2024-11-15 NOTE — Clinical Note
Level of Care: Med/Surg [1]   Admitting Physician: CELENA VICENTE [[id number]]   Attending Physician: CLEENA VICENTE [[id number]]

## 2024-11-15 NOTE — PROGRESS NOTES
"Pharmacy Antimicrobial Dosing Service    Subjective:  Roger Nguyen is a 74 y.o.male admitted with lower extremity swelling and redness. Pharmacy has been consulted to dose Vancomycin for possible cellulitis.    MRSA (P)      Assessment/Plan    1. Day #1 Vancomycin: Goal trough 10-20 mcg/mL for uncomplicated SSTI. Vancomycin 2000 mg (~20 mg/kg ABW) IV x 1, followed by 1500 mg (~15 mg/kg ABW) IV q24h. Obtain level on 11/17 at 1700 for clinical review, or earlier if clinically warranted.     2. Day #1 Ceftriaxone: 2gm IV q24h    Will continue to monitor drug levels, renal function, culture and sensitivities, and patient clinical status.       Objective:  Relevant clinical data and objective history reviewed:  172.7 cm (68\")   93.8 kg (206 lb 12.7 oz)   Ideal body weight: 68.4 kg (150 lb 12.7 oz)  Adjusted ideal body weight: 78.6 kg (173 lb 3.1 oz)  Body mass index is 31.44 kg/m².        Results from last 7 days   Lab Units 11/15/24  1208   CREATININE mg/dL 1.12     Estimated Creatinine Clearance: 62.4 mL/min (by C-G formula based on SCr of 1.12 mg/dL).  No intake/output data recorded.    Results from last 7 days   Lab Units 11/15/24  1208   WBC 10*3/mm3 11.74*     Temperature    11/15/24 1134 11/15/24 1521   Temp: 97.6 °F (36.4 °C) 98.3 °F (36.8 °C)     Baseline culture/source/susceptibility:  Microbiology Results (last 10 days)       ** No results found for the last 240 hours. **            Nisha Clayton, PharmD  11/15/24 17:49 EST    "

## 2024-11-15 NOTE — ED PROVIDER NOTES
Subjective   History of Present Illness  Chief complaint redness and swelling to the legs    History of present illness a 74-year-old male who complains of progressively worsening swelling and redness and drainage to the legs over the last couple of months.  The patient states that he has not had time had looked at his wife's been very ill and he was taking care of her and she recently passed away and the  was yesterday he was brought in for evaluation today.  No fever chills or sweats no weight loss weight gain no chest pain or shortness of breath no recent long car ride plane or immobilization foreign travels antibiotic use or recent hospitalization.      Review of Systems   Constitutional:  Negative for chills and fever.   Respiratory:  Negative for chest tightness and shortness of breath.    Cardiovascular:  Positive for leg swelling.   Gastrointestinal:  Negative for abdominal pain and vomiting.   Genitourinary:  Negative for dysuria.   Skin:  Positive for rash and wound.   Neurological:  Positive for weakness.       No past medical history on file.    No Known Allergies    No past surgical history on file.    No family history on file.    Social History     Socioeconomic History    Marital status:      No tobacco alcohol or drug  Prior to Admission medications    Medication Sig Start Date End Date Taking? Authorizing Provider   VITAMIN D, CHOLECALCIFEROL, PO Take 1 capsule by mouth Daily.   Yes Provider, MD Rosemary   buPROPion XL (WELLBUTRIN XL) 150 MG 24 hr tablet TAKE ONE TABLET BY MOUTH DAILY  Patient not taking: Reported on 11/15/2024 6/22/22   Juan Harding MD   lisinopril-hydrochlorothiazide (PRINZIDE,ZESTORETIC) 20-12.5 MG per tablet TAKE ONE TABLET BY MOUTH DAILY  Patient not taking: Reported on 11/15/2024 7/27/22   Juan Harding MD   vitamin D (ERGOCALCIFEROL) 1.25 MG (06190 UT) capsule capsule TAKE ONE CAPSULE BY MOUTH ONCE WEEKLY  Patient not taking: Reported on  11/15/2024 2/20/20   Valentina Andrade MD        Objective   Physical Exam  Constitutional this is a 74-year-old male awake alert no distress triage vital signs reviewed.  HEENT unremarkable neck supple no adenopathy no JVD lungs clear no retraction heart regular without murmur rub abdomen soft without tenderness good bowel sounds no pulsatile masses extremities pulses equal upper and lower extremities she is got significant edema to both the lower legs from the knee down.  The red or hot fevers he is got some chronic looking wounds no foul odor with some drainage to him.  Toes up-and-down occluding big toe dorsiflex plantarflex that difficulty.  Compartments are soft palpation no pain with passive stretching or pain on portion exam feet have great pulses.  No crepitus or subcutaneous air.  Neurologic he is awake alert and follows commands motor strength normal without focal weakness.  Procedures           ED Course      Results for orders placed or performed during the hospital encounter of 11/15/24   Comprehensive Metabolic Panel    Collection Time: 11/15/24 12:08 PM    Specimen: Blood   Result Value Ref Range    Glucose 87 65 - 99 mg/dL    BUN 24 (H) 8 - 23 mg/dL    Creatinine 1.12 0.76 - 1.27 mg/dL    Sodium 138 136 - 145 mmol/L    Potassium 4.1 3.5 - 5.2 mmol/L    Chloride 101 98 - 107 mmol/L    CO2 27.9 22.0 - 29.0 mmol/L    Calcium 9.4 8.6 - 10.5 mg/dL    Total Protein 7.0 6.0 - 8.5 g/dL    Albumin 3.8 3.5 - 5.2 g/dL    ALT (SGPT) 22 1 - 41 U/L    AST (SGOT) 26 1 - 40 U/L    Alkaline Phosphatase 98 39 - 117 U/L    Total Bilirubin 0.4 0.0 - 1.2 mg/dL    Globulin 3.2 gm/dL    A/G Ratio 1.2 g/dL    BUN/Creatinine Ratio 21.4 7.0 - 25.0    Anion Gap 9.1 5.0 - 15.0 mmol/L    eGFR 68.1 >60.0 mL/min/1.73   T4, Free    Collection Time: 11/15/24 12:08 PM    Specimen: Blood   Result Value Ref Range    Free T4 1.43 0.93 - 1.70 ng/dL   TSH    Collection Time: 11/15/24 12:08 PM    Specimen: Blood   Result Value Ref  Range    TSH 0.620 0.270 - 4.200 uIU/mL   CK    Collection Time: 11/15/24 12:08 PM    Specimen: Blood   Result Value Ref Range    Creatine Kinase 212 (H) 20 - 200 U/L   CBC Auto Differential    Collection Time: 11/15/24 12:08 PM    Specimen: Blood   Result Value Ref Range    WBC 11.74 (H) 3.40 - 10.80 10*3/mm3    RBC 4.35 4.14 - 5.80 10*6/mm3    Hemoglobin 13.5 13.0 - 17.7 g/dL    Hematocrit 41.2 37.5 - 51.0 %    MCV 94.7 79.0 - 97.0 fL    MCH 31.0 26.6 - 33.0 pg    MCHC 32.8 31.5 - 35.7 g/dL    RDW 12.3 12.3 - 15.4 %    RDW-SD 43.2 37.0 - 54.0 fl    MPV 8.8 6.0 - 12.0 fL    Platelets 352 140 - 450 10*3/mm3    Neutrophil % 84.6 (H) 42.7 - 76.0 %    Lymphocyte % 5.2 (L) 19.6 - 45.3 %    Monocyte % 7.5 5.0 - 12.0 %    Eosinophil % 1.4 0.3 - 6.2 %    Basophil % 0.4 0.0 - 1.5 %    Immature Grans % 0.9 (H) 0.0 - 0.5 %    Neutrophils, Absolute 9.93 (H) 1.70 - 7.00 10*3/mm3    Lymphocytes, Absolute 0.61 (L) 0.70 - 3.10 10*3/mm3    Monocytes, Absolute 0.88 0.10 - 0.90 10*3/mm3    Eosinophils, Absolute 0.17 0.00 - 0.40 10*3/mm3    Basophils, Absolute 0.05 0.00 - 0.20 10*3/mm3    Immature Grans, Absolute 0.10 (H) 0.00 - 0.05 10*3/mm3    nRBC 0.0 0.0 - 0.2 /100 WBC   Green Top (Gel)    Collection Time: 11/15/24 12:08 PM   Result Value Ref Range    Extra Tube Hold for add-ons.    Lavender Top    Collection Time: 11/15/24 12:08 PM   Result Value Ref Range    Extra Tube hold for add-on    Gold Top - SST    Collection Time: 11/15/24 12:08 PM   Result Value Ref Range    Extra Tube Hold for add-ons.    Light Blue Top    Collection Time: 11/15/24 12:08 PM   Result Value Ref Range    Extra Tube Hold for add-ons.      No radiology results for the last day  Medications   sodium chloride 0.9 % flush 10 mL (has no administration in time range)   cefTRIAXone (ROCEPHIN) 2,000 mg in sodium chloride 0.9 % 100 mL MBP (has no administration in time range)                                                        Medical Decision Making  Medical  decision making.  IV established labs obtained by independent review comprehensive metabolic profile unremarkable BUN of 24 creatinine 1.1 TSH T4 normal CK2 12.  CBC unremarkable heat white count 11.7 cultures pending patient is given Rocephin 2 g IV.  Patient does not have a primary care provider.  He is got some chronic looking wounds but now he has significant cellulitis although both extremities.  I do not see any evidence suggest acute DVT or necrotizing fasciitis arterial compromise gangrene or sepsis based on my history and physical clinical findings.  I talked to the hospitalist patient made aware of the findings Case was discussed with the hospitalist and patient will be admitted for further care stable unremarkable ER course.    Problems Addressed:  Bilateral lower leg cellulitis: complicated acute illness or injury    Amount and/or Complexity of Data Reviewed  Labs: ordered. Decision-making details documented in ED Course.    Risk  Prescription drug management.  Decision regarding hospitalization.        Final diagnoses:   Bilateral lower leg cellulitis       ED Disposition  ED Disposition       ED Disposition   Intended Admit    Condition   --    Comment   --               No follow-up provider specified.       Medication List      No changes were made to your prescriptions during this visit.            Mario Michel MD  11/15/24 2040

## 2024-11-16 ENCOUNTER — APPOINTMENT (OUTPATIENT)
Dept: GENERAL RADIOLOGY | Facility: HOSPITAL | Age: 74
End: 2024-11-16
Payer: MEDICARE

## 2024-11-16 ENCOUNTER — APPOINTMENT (OUTPATIENT)
Dept: CT IMAGING | Facility: HOSPITAL | Age: 74
End: 2024-11-16
Payer: MEDICARE

## 2024-11-16 LAB
ANION GAP SERPL CALCULATED.3IONS-SCNC: 8.7 MMOL/L (ref 5–15)
BACTERIA UR QL AUTO: ABNORMAL /HPF
BASOPHILS # BLD AUTO: 0.03 10*3/MM3 (ref 0–0.2)
BASOPHILS NFR BLD AUTO: 0.3 % (ref 0–1.5)
BILIRUB UR QL STRIP: NEGATIVE
BUN SERPL-MCNC: 20 MG/DL (ref 8–23)
BUN/CREAT SERPL: 19.4 (ref 7–25)
CALCIUM SPEC-SCNC: 8.6 MG/DL (ref 8.6–10.5)
CHLORIDE SERPL-SCNC: 101 MMOL/L (ref 98–107)
CHOLEST SERPL-MCNC: 122 MG/DL (ref 0–200)
CK SERPL-CCNC: 208 U/L (ref 20–200)
CK SERPL-CCNC: 237 U/L (ref 20–200)
CLARITY UR: CLEAR
CO2 SERPL-SCNC: 26.3 MMOL/L (ref 22–29)
COLOR UR: ABNORMAL
CREAT SERPL-MCNC: 1.03 MG/DL (ref 0.76–1.27)
DEPRECATED RDW RBC AUTO: 43.5 FL (ref 37–54)
EGFRCR SERPLBLD CKD-EPI 2021: 75.3 ML/MIN/1.73
EOSINOPHIL # BLD AUTO: 0.15 10*3/MM3 (ref 0–0.4)
EOSINOPHIL NFR BLD AUTO: 1.5 % (ref 0.3–6.2)
ERYTHROCYTE [DISTWIDTH] IN BLOOD BY AUTOMATED COUNT: 12.4 % (ref 12.3–15.4)
GLUCOSE SERPL-MCNC: 97 MG/DL (ref 65–99)
GLUCOSE UR STRIP-MCNC: NEGATIVE MG/DL
HBA1C MFR BLD: 5.02 % (ref 4.8–5.6)
HCT VFR BLD AUTO: 37.4 % (ref 37.5–51)
HDLC SERPL-MCNC: 32 MG/DL (ref 40–60)
HGB BLD-MCNC: 12 G/DL (ref 13–17.7)
HGB UR QL STRIP.AUTO: ABNORMAL
HYALINE CASTS UR QL AUTO: ABNORMAL /LPF
IMM GRANULOCYTES # BLD AUTO: 0.07 10*3/MM3 (ref 0–0.05)
IMM GRANULOCYTES NFR BLD AUTO: 0.7 % (ref 0–0.5)
KETONES UR QL STRIP: ABNORMAL
LDLC SERPL CALC-MCNC: 78 MG/DL (ref 0–100)
LDLC/HDLC SERPL: 2.49 {RATIO}
LEUKOCYTE ESTERASE UR QL STRIP.AUTO: ABNORMAL
LYMPHOCYTES # BLD AUTO: 0.55 10*3/MM3 (ref 0.7–3.1)
LYMPHOCYTES NFR BLD AUTO: 5.4 % (ref 19.6–45.3)
MAGNESIUM SERPL-MCNC: 2.2 MG/DL (ref 1.6–2.4)
MCH RBC QN AUTO: 30.6 PG (ref 26.6–33)
MCHC RBC AUTO-ENTMCNC: 32.1 G/DL (ref 31.5–35.7)
MCV RBC AUTO: 95.4 FL (ref 79–97)
MONOCYTES # BLD AUTO: 1.01 10*3/MM3 (ref 0.1–0.9)
MONOCYTES NFR BLD AUTO: 10 % (ref 5–12)
NEUTROPHILS NFR BLD AUTO: 8.3 10*3/MM3 (ref 1.7–7)
NEUTROPHILS NFR BLD AUTO: 82.1 % (ref 42.7–76)
NITRITE UR QL STRIP: NEGATIVE
NRBC BLD AUTO-RTO: 0 /100 WBC (ref 0–0.2)
NT-PROBNP SERPL-MCNC: 2181 PG/ML (ref 0–1800)
PH UR STRIP.AUTO: 5.5 [PH] (ref 5–8)
PHOSPHATE SERPL-MCNC: 2.7 MG/DL (ref 2.5–4.5)
PLATELET # BLD AUTO: 318 10*3/MM3 (ref 140–450)
PMV BLD AUTO: 9.4 FL (ref 6–12)
POTASSIUM SERPL-SCNC: 4 MMOL/L (ref 3.5–5.2)
PROT UR QL STRIP: ABNORMAL
RBC # BLD AUTO: 3.92 10*6/MM3 (ref 4.14–5.8)
RBC # UR STRIP: ABNORMAL /HPF
REF LAB TEST METHOD: ABNORMAL
SODIUM SERPL-SCNC: 136 MMOL/L (ref 136–145)
SP GR UR STRIP: 1.02 (ref 1–1.03)
SQUAMOUS #/AREA URNS HPF: ABNORMAL /HPF
TRIGL SERPL-MCNC: 51 MG/DL (ref 0–150)
UROBILINOGEN UR QL STRIP: ABNORMAL
VLDLC SERPL-MCNC: 12 MG/DL (ref 5–40)
WBC # UR STRIP: ABNORMAL /HPF
WBC NRBC COR # BLD AUTO: 10.11 10*3/MM3 (ref 3.4–10.8)

## 2024-11-16 PROCEDURE — 83036 HEMOGLOBIN GLYCOSYLATED A1C: CPT | Performed by: INTERNAL MEDICINE

## 2024-11-16 PROCEDURE — 93005 ELECTROCARDIOGRAM TRACING: CPT | Performed by: NURSE PRACTITIONER

## 2024-11-16 PROCEDURE — 25810000003 SODIUM CHLORIDE 0.9 % SOLUTION: Performed by: INTERNAL MEDICINE

## 2024-11-16 PROCEDURE — 87205 SMEAR GRAM STAIN: CPT | Performed by: INTERNAL MEDICINE

## 2024-11-16 PROCEDURE — 82550 ASSAY OF CK (CPK): CPT

## 2024-11-16 PROCEDURE — 85025 COMPLETE CBC W/AUTO DIFF WBC: CPT | Performed by: INTERNAL MEDICINE

## 2024-11-16 PROCEDURE — 87077 CULTURE AEROBIC IDENTIFY: CPT | Performed by: INTERNAL MEDICINE

## 2024-11-16 PROCEDURE — 87186 SC STD MICRODIL/AGAR DIL: CPT | Performed by: INTERNAL MEDICINE

## 2024-11-16 PROCEDURE — 80048 BASIC METABOLIC PNL TOTAL CA: CPT | Performed by: INTERNAL MEDICINE

## 2024-11-16 PROCEDURE — 25010000002 CEFTRIAXONE PER 250 MG: Performed by: INTERNAL MEDICINE

## 2024-11-16 PROCEDURE — 80061 LIPID PANEL: CPT | Performed by: INTERNAL MEDICINE

## 2024-11-16 PROCEDURE — 87086 URINE CULTURE/COLONY COUNT: CPT

## 2024-11-16 PROCEDURE — 93010 ELECTROCARDIOGRAM REPORT: CPT | Performed by: INTERNAL MEDICINE

## 2024-11-16 PROCEDURE — 82550 ASSAY OF CK (CPK): CPT | Performed by: INTERNAL MEDICINE

## 2024-11-16 PROCEDURE — 71045 X-RAY EXAM CHEST 1 VIEW: CPT

## 2024-11-16 PROCEDURE — 83735 ASSAY OF MAGNESIUM: CPT | Performed by: INTERNAL MEDICINE

## 2024-11-16 PROCEDURE — 83880 ASSAY OF NATRIURETIC PEPTIDE: CPT

## 2024-11-16 PROCEDURE — 81001 URINALYSIS AUTO W/SCOPE: CPT

## 2024-11-16 PROCEDURE — 25510000001 IOPAMIDOL PER 1 ML

## 2024-11-16 PROCEDURE — 25010000002 HEPARIN (PORCINE) PER 1000 UNITS: Performed by: INTERNAL MEDICINE

## 2024-11-16 PROCEDURE — 25010000002 HYDRALAZINE PER 20 MG: Performed by: INTERNAL MEDICINE

## 2024-11-16 PROCEDURE — 87070 CULTURE OTHR SPECIMN AEROBIC: CPT | Performed by: INTERNAL MEDICINE

## 2024-11-16 PROCEDURE — 74178 CT ABD&PLV WO CNTR FLWD CNTR: CPT

## 2024-11-16 PROCEDURE — 84100 ASSAY OF PHOSPHORUS: CPT | Performed by: INTERNAL MEDICINE

## 2024-11-16 PROCEDURE — 87147 CULTURE TYPE IMMUNOLOGIC: CPT | Performed by: INTERNAL MEDICINE

## 2024-11-16 RX ORDER — IOPAMIDOL 755 MG/ML
100 INJECTION, SOLUTION INTRAVASCULAR
Status: COMPLETED | OUTPATIENT
Start: 2024-11-16 | End: 2024-11-16

## 2024-11-16 RX ORDER — HYDRALAZINE HYDROCHLORIDE 20 MG/ML
10 INJECTION INTRAMUSCULAR; INTRAVENOUS EVERY 4 HOURS PRN
Status: DISCONTINUED | OUTPATIENT
Start: 2024-11-16 | End: 2024-11-23 | Stop reason: HOSPADM

## 2024-11-16 RX ORDER — LOSARTAN POTASSIUM 50 MG/1
50 TABLET ORAL
Status: DISCONTINUED | OUTPATIENT
Start: 2024-11-16 | End: 2024-11-22

## 2024-11-16 RX ADMIN — Medication 10 ML: at 20:40

## 2024-11-16 RX ADMIN — HEPARIN SODIUM 5000 UNITS: 5000 INJECTION INTRAVENOUS; SUBCUTANEOUS at 20:40

## 2024-11-16 RX ADMIN — HEPARIN SODIUM 5000 UNITS: 5000 INJECTION INTRAVENOUS; SUBCUTANEOUS at 08:29

## 2024-11-16 RX ADMIN — HYDROCODONE BITARTRATE AND ACETAMINOPHEN 1 TABLET: 10; 325 TABLET ORAL at 03:43

## 2024-11-16 RX ADMIN — HYDROCODONE BITARTRATE AND ACETAMINOPHEN 1 TABLET: 10; 325 TABLET ORAL at 10:43

## 2024-11-16 RX ADMIN — SODIUM CHLORIDE 75 ML/HR: 9 INJECTION, SOLUTION INTRAVENOUS at 00:06

## 2024-11-16 RX ADMIN — Medication 10 ML: at 08:29

## 2024-11-16 RX ADMIN — LOSARTAN POTASSIUM 50 MG: 50 TABLET, FILM COATED ORAL at 12:33

## 2024-11-16 RX ADMIN — HYDRALAZINE HYDROCHLORIDE 10 MG: 20 INJECTION INTRAMUSCULAR; INTRAVENOUS at 10:54

## 2024-11-16 RX ADMIN — IOPAMIDOL 100 ML: 755 INJECTION, SOLUTION INTRAVENOUS at 16:49

## 2024-11-16 RX ADMIN — CEFTRIAXONE 2000 MG: 2 INJECTION, POWDER, FOR SOLUTION INTRAMUSCULAR; INTRAVENOUS at 14:32

## 2024-11-16 RX ADMIN — AMLODIPINE BESYLATE 10 MG: 5 TABLET ORAL at 08:29

## 2024-11-16 NOTE — CASE MANAGEMENT/SOCIAL WORK
Discharge Planning Assessment   Malcolm     Patient Name: Roger Nguyen  MRN: [id number]  Today's Date: 11/15/2024    Admit Date: 11/15/2024    Plan: From Home Alone; Watch for IV ABX; PT/OT Evals pending   Discharge Needs Assessment       Row Name 11/15/24 1913       Living Environment    People in Home alone    Current Living Arrangements home    Potentially Unsafe Housing Conditions none    In the past 12 months has the electric, gas, oil, or water company threatened to shut off services in your home? No    Primary Care Provided by self    Provides Primary Care For no one    Family Caregiver if Needed none    Quality of Family Relationships unable to assess    Able to Return to Prior Arrangements yes    Living Arrangement Comments Home       Resource/Environmental Concerns    Resource/Environmental Concerns none    Transportation Concerns none       Transportation Needs    In the past 12 months, has lack of transportation kept you from medical appointments or from getting medications? no    In the past 12 months, has lack of transportation kept you from meetings, work, or from getting things needed for daily living? No       Food Insecurity    Within the past 12 months, you worried that your food would run out before you got the money to buy more. Never true    Within the past 12 months, the food you bought just didn't last and you didn't have money to get more. Never true       Transition Planning    Patient/Family Anticipates Transition to home;home with help/services    Patient/Family Anticipated Services at Transition ;none    Transportation Anticipated car, drives self;family or friend will provide       Discharge Needs Assessment    Readmission Within the Last 30 Days no previous admission in last 30 days    Equipment Currently Used at Home none    Concerns to be Addressed discharge planning    Do you want help finding or keeping work or a job? I do not need or want help    Do you want help  with school or training? For example, starting or completing job training or getting a high school diploma, GED or equivalent No    Anticipated Changes Related to Illness none    Equipment Needed After Discharge none    Outpatient/Agency/Support Group Needs homecare agency    Provided Post Acute Provider List? N/A    Provided Post Acute Provider Quality & Resource List? N/A    Offered/Gave Vendor List no                   Discharge Plan       Row Name 11/15/24 1913       Plan    Plan From Home Alone; Watch for IV ABX; PT/OT Evals pending    Patient/Family in Agreement with Plan unable to assess    Plan Comments Met with patient at bedside, confirmed Pharm and updated PCP. States he lives home alone and is independent with ADL's. Denies financial concerns and friend can provide dc transportation. Watch for Home IV ABX. Discussed dc plan, pt declines SNF or HH needs. PT/OT evals pending. Final plan is pending clinical course and therapy evals. DC Barriers: IV ABX; Urology Consult                  Continued Care and Services - Admitted Since 11/15/2024    No active coordination exists for this encounter.          Demographic Summary       Row Name 11/15/24 1912       General Information    Admission Type inpatient    Arrived From home    Required Notices Provided Important Message from Medicare    Referral Source emergency department    Reason for Consult discharge planning    Preferred Language English       Contact Information    Permission Granted to Share Info With                    Functional Status       Row Name 11/15/24 1912       Functional Status    Usual Activity Tolerance excellent    Current Activity Tolerance good       Physical Activity    On average, how many days per week do you engage in moderate to strenuous exercise (like a brisk walk)? 7 days    On average, how many minutes do you engage in exercise at this level? 60 min    Number of minutes of exercise per week 420       Assessment of  Health Literacy    How often do you have someone help you read hospital materials? Never    How often do you have problems learning about your medical condition because of difficulty understanding written information? Never    How often do you have a problem understanding what is told to you about your medical condition? Never    How confident are you filling out medical forms by yourself? Extremely    Health Literacy Excellent       Functional Status, IADL    Medications independent    Meal Preparation independent    Housekeeping independent    Laundry independent    Shopping independent    If for any reason you need help with day-to-day activities such as bathing, preparing meals, shopping, managing finances, etc., do you get the help you need? I get all the help I need       Mental Status    General Appearance WDL WDL       Mental Status Summary    Recent Changes in Mental Status/Cognitive Functioning no changes       Employment/    Employment Status retired    Current or Previous Occupation education              Patient Forms       Row Name 11/15/24 1912       Patient Forms    Important Message from Medicare (IMM) Delivered  IMM per Reg    Delivered to Patient    Method of delivery In person             Met with patient in room wearing PPE: mask    Maintained distance greater than six feet and spent less than 15 minutes in the room    Thalia Abbott RN    Phone [phone]  Fax [phone]

## 2024-11-16 NOTE — SIGNIFICANT NOTE
11/16/24 1827   OTHER   Discipline physical therapist   Rehab Time/Intention   Session Not Performed other (see comments)  (Attempted to see, off of floor for CT. PT to f/u in AM)   Recommendation   PT - Next Appointment 11/17/24

## 2024-11-16 NOTE — PLAN OF CARE
Goal Outcome Evaluation:      Pt having severe pain in bilateral extremities overnight. Pt continues to have blood and blood clots in his urine.

## 2024-11-16 NOTE — CONSULTS
Urology Consult Note    Patient:Roger Nguyen :1950  Room:[facility]  Admit Date11/15/2024  Age:74 y.o.     SEX:male     DOS:2024     MR:[id number]     Visit:[id number]       Attending: Latia Amanda,*  Referring Provider: Dr. Amanda  Reason for Consultation: Gross hematuria    Patient Care Team:  Valentina Andrade MD as PCP - General (Internal Medicine)    Chief complaint lower extremity swelling and pain    Subjective .     History of present illness: 74-year-old gentleman who presented to the emergency room with lower extremity swelling and pain.  He has neglected his care for some time as his wife has been ill and she recently passed away.  Patient was also noted to have some gross hematuria with small blood clots.  Patient is not really able to characterize this and does not really note any significant gross hematuria.  He denies any voiding complaints other than some slowing of his stream and some nocturia.  He denies any flank pain.    Review of Systems  10 point review of systems were reviewed and are negative except for:  Constitution:  positive for See HPI    History  History reviewed. No pertinent past medical history.  History reviewed. No pertinent surgical history.  Social History     Socioeconomic History    Marital status:    Tobacco Use    Smoking status: Never    Smokeless tobacco: Never   Vaping Use    Vaping status: Never Used   Substance and Sexual Activity    Alcohol use: Never    Drug use: Never    Sexual activity: Defer     History reviewed. No pertinent family history.  Allergy  No Known Allergies  Prior to Admission medications    Medication Sig Start Date End Date Taking? Authorizing Provider   VITAMIN D, CHOLECALCIFEROL, PO Take 1 capsule by mouth Daily.   Yes Provider, MD Rosemary   buPROPion XL (WELLBUTRIN XL) 150 MG 24 hr tablet TAKE ONE TABLET BY MOUTH DAILY  Patient not taking: Reported on 11/15/2024 6/22/22   Juan Harding MD    lisinopril-hydrochlorothiazide (PRINZIDE,ZESTORETIC) 20-12.5 MG per tablet TAKE ONE TABLET BY MOUTH DAILY  Patient not taking: Reported on 11/15/2024 7/27/22   Juan Harding MD   vitamin D (ERGOCALCIFEROL) 1.25 MG (21336 UT) capsule capsule TAKE ONE CAPSULE BY MOUTH ONCE WEEKLY  Patient not taking: Reported on 11/15/2024 2/20/20   Valentina Andrade MD         Objective     tMax 24 hours:  Temp (24hrs), Av.1 °F (36.7 °C), Min:97.6 °F (36.4 °C), Max:98.7 °F (37.1 °C)    Vital Sign Ranges:  Temp:  [97.6 °F (36.4 °C)-98.7 °F (37.1 °C)] 97.6 °F (36.4 °C)  Heart Rate:  [79-92] 89  Resp:  [16-18] 16  BP: (137-200)/() 168/65  Intake and Output Last 3 Shifts:  I/O last 3 completed shifts:  In: -   Out: 950 [Urine:950]      Physical Exam:   General Appearance alert, appears stated age, and cooperative  Head normocephalic, without obvious abnormality and atraumatic  Abdomen no guarding and no rebound tenderness  Skin no bleeding, bruising or rash  Neurologic Mental Status orientated to person, place, time and situation    Results Review:     Lab Results (last 24 hours)       Procedure Component Value Units Date/Time    Wound Culture - Wound, Leg [[id number]] Collected: 24    Specimen: Wound from Leg Updated: 24     Gram Stain Rare (1+) WBCs per low power field      No organisms seen    Basic Metabolic Panel [[id number]]  (Normal) Collected: 24    Specimen: Blood Updated: 24     Glucose 97 mg/dL      BUN 20 mg/dL      Creatinine 1.03 mg/dL      Sodium 136 mmol/L      Potassium 4.0 mmol/L      Chloride 101 mmol/L      CO2 26.3 mmol/L      Calcium 8.6 mg/dL      BUN/Creatinine Ratio 19.4     Anion Gap 8.7 mmol/L      eGFR 75.3 mL/min/1.73     Narrative:      GFR Normal >60  Chronic Kidney Disease <60  Kidney Failure <15    The GFR formula is only valid for adults with stable renal function between ages 18 and 70.    Magnesium [[id number]]  (Normal) Collected: 24  0006    Specimen: Blood Updated: 11/16/24 0220     Magnesium 2.2 mg/dL     Phosphorus [[id number]]  (Normal) Collected: 11/16/24 0006    Specimen: Blood Updated: 11/16/24 0220     Phosphorus 2.7 mg/dL     Lipid Panel [[id number]]  (Abnormal) Collected: 11/16/24 0006    Specimen: Blood Updated: 11/16/24 0220     Total Cholesterol 122 mg/dL      Triglycerides 51 mg/dL      HDL Cholesterol 32 mg/dL      LDL Cholesterol  78 mg/dL      VLDL Cholesterol 12 mg/dL      LDL/HDL Ratio 2.49    Narrative:      Cholesterol Reference Ranges  (U.S. Department of Health and Human Services ATP III Classifications)    Desirable          <200 mg/dL  Borderline High    200-239 mg/dL  High Risk          >240 mg/dL      Triglyceride Reference Ranges  (U.S. Department of Health and Human Services ATP III Classifications)    Normal           <150 mg/dL  Borderline High  150-199 mg/dL  High             200-499 mg/dL  Very High        >500 mg/dL    HDL Reference Ranges  (U.S. Department of Health and Human Services ATP III Classifications)    Low     <40 mg/dl (major risk factor for CHD)  High    >60 mg/dl ('negative' risk factor for CHD)        LDL Reference Ranges  (U.S. Department of Health and Human Services ATP III Classifications)    Optimal          <100 mg/dL  Near Optimal     100-129 mg/dL  Borderline High  130-159 mg/dL  High             160-189 mg/dL  Very High        >189 mg/dL    CK [[id number]]  (Abnormal) Collected: 11/16/24 0006    Specimen: Blood Updated: 11/16/24 0220     Creatine Kinase 208 U/L     Hemoglobin A1c [[id number]]  (Normal) Collected: 11/16/24 0006    Specimen: Blood Updated: 11/16/24 0216     Hemoglobin A1C 5.02 %     CBC & Differential [[id number]]  (Abnormal) Collected: 11/16/24 0006    Specimen: Blood Updated: 11/16/24 0149    Narrative:      The following orders were created for panel order CBC & Differential.  Procedure                               Abnormality         Status                     ---------                                -----------         ------                     CBC Auto Differential[[id number]]        Abnormal            Final result                 Please view results for these tests on the individual orders.    CBC Auto Differential [[id number]]  (Abnormal) Collected: 11/16/24 0006    Specimen: Blood Updated: 11/16/24 0149     WBC 10.11 10*3/mm3      RBC 3.92 10*6/mm3      Hemoglobin 12.0 g/dL      Hematocrit 37.4 %      MCV 95.4 fL      MCH 30.6 pg      MCHC 32.1 g/dL      RDW 12.4 %      RDW-SD 43.5 fl      MPV 9.4 fL      Platelets 318 10*3/mm3      Neutrophil % 82.1 %      Lymphocyte % 5.4 %      Monocyte % 10.0 %      Eosinophil % 1.5 %      Basophil % 0.3 %      Immature Grans % 0.7 %      Neutrophils, Absolute 8.30 10*3/mm3      Lymphocytes, Absolute 0.55 10*3/mm3      Monocytes, Absolute 1.01 10*3/mm3      Eosinophils, Absolute 0.15 10*3/mm3      Basophils, Absolute 0.03 10*3/mm3      Immature Grans, Absolute 0.07 10*3/mm3      nRBC 0.0 /100 WBC     MRSA Screen, PCR (Inpatient) - Swab, Nares [[id number]]  (Normal) Collected: 11/15/24 1825    Specimen: Swab from Nares Updated: 11/15/24 2019     MRSA PCR No MRSA Detected    Narrative:      The negative predictive value of this diagnostic test is high and should only be used to consider de-escalating anti-MRSA therapy. A positive result may indicate colonization with MRSA and must be correlated clinically.    Urinalysis With Culture If Indicated - Urine, Clean Catch [[id number]]  (Abnormal) Collected: 11/15/24 1824    Specimen: Urine, Clean Catch Updated: 11/15/24 1841     Color, UA Yellow     Appearance, UA Slightly Cloudy     pH, UA 7.0     Specific Gravity, UA 1.037     Glucose, UA Negative     Ketones, UA Trace     Bilirubin, UA Negative     Blood, UA Large (3+)     Protein, UA Negative     Leuk Esterase, UA Trace     Nitrite, UA Negative     Urobilinogen, UA 2.0 E.U./dL    Narrative:      In absence of clinical symptoms, the presence of pyuria,  "bacteria, and/or nitrites on the urinalysis result does not correlate with infection.    Urinalysis, Microscopic Only - Urine, Clean Catch [[id number]]  (Abnormal) Collected: 11/15/24 1824    Specimen: Urine, Clean Catch Updated: 11/15/24 1841     RBC, UA Too Numerous to Count /HPF      WBC, UA 11-20 /HPF      Bacteria, UA None Seen /HPF      Squamous Epithelial Cells, UA 0-2 /HPF      Hyaline Casts, UA None Seen /LPF      Methodology Automated Microscopy    Urine Culture - Urine, Urine, Clean Catch [[id number]] Collected: 11/15/24 1824    Specimen: Urine, Clean Catch Updated: 11/15/24 1841    Procalcitonin [[id number]]  (Normal) Collected: 11/15/24 1208    Specimen: Blood Updated: 11/15/24 1757     Procalcitonin 0.07 ng/mL     Narrative:      As a Marker for Sepsis (Non-Neonates):    1. <0.5 ng/mL represents a low risk of severe sepsis and/or septic shock.  2. >2 ng/mL represents a high risk of severe sepsis and/or septic shock.    As a Marker for Lower Respiratory Tract Infections that require antibiotic therapy:    PCT on Admission    Antibiotic Therapy       6-12 Hrs later    >0.5                Strongly Recommended  >0.25 - <0.5        Recommended   0.1 - 0.25          Discouraged              Remeasure/reassess PCT  <0.1                Strongly Discouraged     Remeasure/reassess PCT    As 28 day mortality risk marker: \"Change in Procalcitonin Result\" (>80% or <=80%) if Day 0 (or Day 1) and Day 4 values are available. Refer to http://www.[facility]-pct-calculator.com    Change in PCT <=80%  A decrease of PCT levels below or equal to 80% defines a positive change in PCT test result representing a higher risk for 28-day all-cause mortality of patients diagnosed with severe sepsis for septic shock.    Change in PCT >80%  A decrease of PCT levels of more than 80% defines a negative change in PCT result representing a lower risk for 28-day all-cause mortality of patients diagnosed with severe sepsis or septic shock.       " C-reactive Protein [[id number]]  (Abnormal) Collected: 11/15/24 1208    Specimen: Blood Updated: 11/15/24 1757     C-Reactive Protein 10.30 mg/dL     Blood Culture - Blood, Arm, Right [[id number]] Collected: 11/15/24 1255    Specimen: Blood from Arm, Right Updated: 11/15/24 1258    Comprehensive Metabolic Panel [[id number]]  (Abnormal) Collected: 11/15/24 1208    Specimen: Blood Updated: 11/15/24 1247     Glucose 87 mg/dL      BUN 24 mg/dL      Creatinine 1.12 mg/dL      Sodium 138 mmol/L      Potassium 4.1 mmol/L      Chloride 101 mmol/L      CO2 27.9 mmol/L      Calcium 9.4 mg/dL      Total Protein 7.0 g/dL      Albumin 3.8 g/dL      ALT (SGPT) 22 U/L      AST (SGOT) 26 U/L      Alkaline Phosphatase 98 U/L      Total Bilirubin 0.4 mg/dL      Globulin 3.2 gm/dL      A/G Ratio 1.2 g/dL      BUN/Creatinine Ratio 21.4     Anion Gap 9.1 mmol/L      eGFR 68.1 mL/min/1.73     Narrative:      GFR Normal >60  Chronic Kidney Disease <60  Kidney Failure <15    The GFR formula is only valid for adults with stable renal function between ages 18 and 70.    T4, Free [[id number]]  (Normal) Collected: 11/15/24 1208    Specimen: Blood Updated: 11/15/24 1247     Free T4 1.43 ng/dL     TSH [[id number]]  (Normal) Collected: 11/15/24 1208    Specimen: Blood Updated: 11/15/24 1247     TSH 0.620 uIU/mL     CK [[id number]]  (Abnormal) Collected: 11/15/24 1208    Specimen: Blood Updated: 11/15/24 1247     Creatine Kinase 212 U/L     Blood Culture - Blood, Arm, Left [[id number]] Collected: 11/15/24 1232    Specimen: Blood from Arm, Left Updated: 11/15/24 1242    CBC & Differential [[id number]]  (Abnormal) Collected: 11/15/24 1208    Specimen: Blood Updated: 11/15/24 1228    Narrative:      The following orders were created for panel order CBC & Differential.  Procedure                               Abnormality         Status                     ---------                               -----------         ------                     CBC Auto  Differential[[id number]]        Abnormal            Final result                 Please view results for these tests on the individual orders.    CBC Auto Differential [[id number]]  (Abnormal) Collected: 11/15/24 1208    Specimen: Blood Updated: 11/15/24 1228     WBC 11.74 10*3/mm3      RBC 4.35 10*6/mm3      Hemoglobin 13.5 g/dL      Hematocrit 41.2 %      MCV 94.7 fL      MCH 31.0 pg      MCHC 32.8 g/dL      RDW 12.3 %      RDW-SD 43.2 fl      MPV 8.8 fL      Platelets 352 10*3/mm3      Neutrophil % 84.6 %      Lymphocyte % 5.2 %      Monocyte % 7.5 %      Eosinophil % 1.4 %      Basophil % 0.4 %      Immature Grans % 0.9 %      Neutrophils, Absolute 9.93 10*3/mm3      Lymphocytes, Absolute 0.61 10*3/mm3      Monocytes, Absolute 0.88 10*3/mm3      Eosinophils, Absolute 0.17 10*3/mm3      Basophils, Absolute 0.05 10*3/mm3      Immature Grans, Absolute 0.10 10*3/mm3      nRBC 0.0 /100 WBC     [place] Draw [[id number]] Collected: 11/15/24 1208    Specimen: Blood Updated: 11/15/24 1215    Narrative:      The following orders were created for panel order [place] Draw.  Procedure                               Abnormality         Status                     ---------                               -----------         ------                     Green Top (Gel)[[id number]]                                  Final result               Lavender Top[[id number]]                                     Final result               Gold Top - [facility][[id number]]                                   Final result               Light Blue Top[[id number]]                                   Final result                 Please view results for these tests on the individual orders.    Green Top (Gel) [[id number]] Collected: 11/15/24 1208    Specimen: Blood Updated: 11/15/24 1215     Extra Tube Hold for add-ons.     Comment: Auto resulted.       Lavender Top [[id number]] Collected: 11/15/24 1208    Specimen: Blood Updated: 11/15/24 1215     Extra Tube hold for  "add-on     Comment: Auto resulted       Gold Top - [facility] [[id number]] Collected: 11/15/24 1208    Specimen: Blood Updated: 11/15/24 1215     Extra Tube Hold for add-ons.     Comment: Auto resulted.       Light Blue Top [[id number]] Collected: 11/15/24 1208    Specimen: Blood Updated: 11/15/24 1215     Extra Tube Hold for add-ons.     Comment: Auto resulted              No results found for: \"URINECX\"     Imaging Results (Last 7 Days)       Procedure Component Value Units Date/Time    CT Lower Extremity Bilateral With Contrast [[id number]] Collected: 11/15/24 1910     Updated: 11/15/24 1922    Narrative:      CT LOWER EXTREMITY BILATERAL W CONTRAST    Date of Exam: 11/15/2024 5:12 PM EST    Indication: Cellulitis.    Comparison: None available.    Technique: Axial CT images were obtained of the bilateral lower extremities after the uneventful intravenous administration of iodinated contrast.  Sagittal and coronal reconstructions were performed.  Automated exposure control and iterative   reconstruction methods were used.      Findings:  Subcutaneous soft tissue edema of both lower extremities from the knees to the feet. No organized fluid collection is seen. No visible soft tissue gas.    Prior plate and screw fixation of the left distal fibula.    No acute fracture or dislocation. No evidence of erosions or aggressive periostitis to strongly suggest acute osteomyelitis. Ossicles and irregularity at the bilateral medial and lateral malleoli, likely sequelae of prior trauma.    Mild tricompartmental osteoarthrosis of both knees. Small right and trace left knee joint effusions, nonspecific. There are also mild to moderate degenerative changes seen in the imaged foot and ankle bilaterally.      Impression:      Impression:  Subcutaneous soft tissue edema involving both lower extremities from the knees to the feet, nonspecific, but may represent cellulitis in the appropriate clinical context. No organized fluid collection or " visible soft tissue gas. No radiographic evidence   of acute osteomyelitis.        Electronically Signed: Nando RAYA Elsajohn    11/15/2024 7:20 PM EST    Workstation ID: [id number]            Inpatient Meds:   Scheduled Meds:amLODIPine, 10 mg, Oral, Q24H  cefTRIAXone, 2,000 mg, Intravenous, Q24H  heparin (porcine), 5,000 Units, Subcutaneous, Q12H  iopamidol, 100 mL, Intravenous, Once in imaging  sodium chloride, 10 mL, Intravenous, Q12H  vancomycin, 15 mg/kg, Intravenous, Q24H       Continuous Infusions:Pharmacy to dose vancomycin,   sodium chloride, 75 mL/hr, Last Rate: 75 mL/hr (11/16/24 0006)       PRN Meds:.  acetaminophen **OR** acetaminophen **OR** acetaminophen    ALPRAZolam    aluminum-magnesium hydroxide-simethicone    senna-docusate sodium **AND** polyethylene glycol **AND** bisacodyl **AND** bisacodyl    Calcium Replacement - Follow Nurse / BPA Driven Protocol    hydrALAZINE    HYDROcodone-acetaminophen    Magnesium Cardiology Dose Replacement - Follow Nurse / BPA Driven Protocol    melatonin    nitroglycerin    ondansetron ODT **OR** ondansetron    Pharmacy to dose vancomycin    Phosphorus Replacement - Follow Nurse / BPA Driven Protocol    Potassium Replacement - Follow Nurse / BPA Driven Protocol    [COMPLETED] Insert Peripheral IV **AND** sodium chloride    sodium chloride    sodium chloride    traZODone      Assessment & Plan     Principal Problem:    Cellulitis    Gross hematuria  BPH with mild voiding symptoms    Plan  CT scan hematuria protocol  Bedside cystoscopy Monday morning if patient still here  If patient discharged later today or tomorrow then have patient follow-up with me in the office for a cystoscopy      I discussed the patient's findings and my recommendations with patient and nursing staff    Thank you for this  consult    Gilles Monsivais MD  11/16/24  08:44 EST

## 2024-11-16 NOTE — PLAN OF CARE
Goal Outcome Evaluation:              Outcome Evaluation: Pt has been resting comfortably throughout the shift. He has gotten a CXR and CT of abdomen and tolerated the activities well. He is alert and oriented and on room air. He has bilateral leg dressings of ace wrap and kerlex. He is on IV abx at this time. Patient is stable with no complaints at this time.

## 2024-11-16 NOTE — PROGRESS NOTES
[facility] MEDICINE SERVICE  DAILY PROGRESS NOTE    NAME: Roger Nguyen  : 1950  MRN: [id number]      LOS: 1 day     PROVIDER OF SERVICE: Latia Amanda MD    Chief Complaint: Cellulitis    Subjective:     Interval History:  History taken from: patient    Patient seen and examined at bedside this morning.  States that he has been having bilateral lower extremity edema for years however never followed up with any PCP as he was taking care of his wife who was sick.  Has not been on any medications at home        Review of Systems: Negative except as described above  Review of Systems    Objective:     Vital Signs  Temp:  [97.5 °F (36.4 °C)-98.7 °F (37.1 °C)] 97.5 °F (36.4 °C)  Heart Rate:  [79-92] 87  Resp:  [15-18] 15  BP: (137-179)/(60-98) 156/60   Body mass index is 30.34 kg/m².    Physical Exam  Physical Exam  Constitutional:       Comments: NAD    Cardiovascular:      Comments:  RRR, S1 & S2   Pulmonary:      Comments:  Lungs CTA   Abdominal:      Comments:  ABD soft, NT            Diagnostic Data    Results from last 7 days   Lab Units 24  0006 11/15/24  1208   WBC 10*3/mm3 10.11 11.74*   HEMOGLOBIN g/dL 12.0* 13.5   HEMATOCRIT % 37.4* 41.2   PLATELETS 10*3/mm3 318 352   GLUCOSE mg/dL 97 87   CREATININE mg/dL 1.03 1.12   BUN mg/dL 20 24*   SODIUM mmol/L 136 138   POTASSIUM mmol/L 4.0 4.1   AST (SGOT) U/L  --  26   ALT (SGPT) U/L  --  22   ALK PHOS U/L  --  98   BILIRUBIN mg/dL  --  0.4   ANION GAP mmol/L 8.7 9.1       CT Lower Extremity Bilateral With Contrast    Result Date: 11/15/2024  Impression: Subcutaneous soft tissue edema involving both lower extremities from the knees to the feet, nonspecific, but may represent cellulitis in the appropriate clinical context. No organized fluid collection or visible soft tissue gas. No radiographic evidence of acute osteomyelitis. Electronically Signed: Nando Cesar  11/15/2024 7:20 PM EST  Workstation ID: [id number]       I reviewed the  patient's new clinical results.    Assessment/Plan:     Active and Resolved Problems  Active Hospital Problems    Diagnosis  POA    **Cellulitis [L03.90]  Yes      Resolved Hospital Problems   No resolved problems to display.       #Bilateral lower extremity swelling concerning for cellulitis  -Patient presented with worsened bilateral lower extremity swelling pain and redness.  Per the patient the bilateral lower extremity swelling has been ongoing for a few years and it gradually worsened.  -CRP elevated however Pro-Vahe WNL  -Repeat CK  -MRSA negative  -TSH WNL  - TTE, CXR and BNP ordered  -Bilateral venous Doppler negative for DVT  -CT lower extremity with contrast shows subcutaneous soft tissue edema in bilateral lower extremities.  Unlikely this looks like cellulitis as patient does not have any tenderness   -DC vancomycin and continue patient on Rocephin for now  -Wound culture negative  -wound care consulted  -PT OT to see      Hematuria  -Blood seen on 2 different UAs  Urology consulted, ordered CT scan hematuria protocol and state will do a bedside cystoscopy on Monday  Urine culture pending    #Hypertension  - DC amlodipine in the setting of bilateral lower extremity edema  -Start patient on losartan and hydralazine as needed     #Social issue  -Will need PCP.  Will need blood pressure medication prescription on discharge.    VTE Prophylaxis:  Pharmacologic VTE prophylaxis orders are present.                    Time: 35 minutes     There are no questions and answers to display.       Signature: Electronically signed by Latia Amanda MD, 11/16/24, 11:39 EST.  [facility]ist Team

## 2024-11-17 ENCOUNTER — APPOINTMENT (OUTPATIENT)
Dept: CARDIOLOGY | Facility: HOSPITAL | Age: 74
End: 2024-11-17
Payer: MEDICARE

## 2024-11-17 ENCOUNTER — APPOINTMENT (OUTPATIENT)
Dept: CT IMAGING | Facility: HOSPITAL | Age: 74
End: 2024-11-17
Payer: MEDICARE

## 2024-11-17 LAB
AMMONIA BLD-SCNC: 17 UMOL/L (ref 16–60)
ANION GAP SERPL CALCULATED.3IONS-SCNC: 10.1 MMOL/L (ref 5–15)
AORTIC DIMENSIONLESS INDEX: 0.65 (DI)
BACTERIA SPEC AEROBE CULT: NO GROWTH
BACTERIA SPEC AEROBE CULT: NO GROWTH
BASOPHILS # BLD AUTO: 0.03 10*3/MM3 (ref 0–0.2)
BASOPHILS NFR BLD AUTO: 0.2 % (ref 0–1.5)
BH CV ECHO MEAS - AO MAX PG: 13.7 MMHG
BH CV ECHO MEAS - AO MEAN PG: 7 MMHG
BH CV ECHO MEAS - AO V2 MAX: 185 CM/SEC
BH CV ECHO MEAS - AO V2 VTI: 31.5 CM
BH CV ECHO MEAS - AVA(I,D): 2.06 CM2
BH CV ECHO MEAS - EDV(CUBED): 117.6 ML
BH CV ECHO MEAS - EDV(MOD-SP2): 93.4 ML
BH CV ECHO MEAS - EDV(MOD-SP4): 111 ML
BH CV ECHO MEAS - EF(MOD-BP): 72.4 %
BH CV ECHO MEAS - EF(MOD-SP2): 69.4 %
BH CV ECHO MEAS - EF(MOD-SP4): 74.1 %
BH CV ECHO MEAS - ESV(CUBED): 46.7 ML
BH CV ECHO MEAS - ESV(MOD-SP2): 28.6 ML
BH CV ECHO MEAS - ESV(MOD-SP4): 28.8 ML
BH CV ECHO MEAS - FS: 26.5 %
BH CV ECHO MEAS - IVS/LVPW: 1 CM
BH CV ECHO MEAS - IVSD: 1 CM
BH CV ECHO MEAS - LA DIMENSION: 4.2 CM
BH CV ECHO MEAS - LAT PEAK E' VEL: 10.4 CM/SEC
BH CV ECHO MEAS - LV DIASTOLIC VOL/BSA (35-75): 53.2 CM2
BH CV ECHO MEAS - LV MASS(C)D: 176 GRAMS
BH CV ECHO MEAS - LV MAX PG: 5.9 MMHG
BH CV ECHO MEAS - LV MEAN PG: 3 MMHG
BH CV ECHO MEAS - LV SYSTOLIC VOL/BSA (12-30): 13.8 CM2
BH CV ECHO MEAS - LV V1 MAX: 121 CM/SEC
BH CV ECHO MEAS - LV V1 VTI: 20.7 CM
BH CV ECHO MEAS - LVIDD: 4.9 CM
BH CV ECHO MEAS - LVIDS: 3.6 CM
BH CV ECHO MEAS - LVOT AREA: 3.1 CM2
BH CV ECHO MEAS - LVOT DIAM: 2 CM
BH CV ECHO MEAS - LVPWD: 1 CM
BH CV ECHO MEAS - MED PEAK E' VEL: 9.5 CM/SEC
BH CV ECHO MEAS - MR MAX PG: 145.4 MMHG
BH CV ECHO MEAS - MR MAX VEL: 603 CM/SEC
BH CV ECHO MEAS - MR MEAN PG: 92 MMHG
BH CV ECHO MEAS - MR MEAN VEL: 457 CM/SEC
BH CV ECHO MEAS - MR VTI: 161 CM
BH CV ECHO MEAS - MV A DUR: 0.12 SEC
BH CV ECHO MEAS - MV A MAX VEL: 101 CM/SEC
BH CV ECHO MEAS - MV DEC SLOPE: 843 CM/SEC2
BH CV ECHO MEAS - MV DEC TIME: 0.24 SEC
BH CV ECHO MEAS - MV E MAX VEL: 98.5 CM/SEC
BH CV ECHO MEAS - MV E/A: 0.98
BH CV ECHO MEAS - MV MAX PG: 9.1 MMHG
BH CV ECHO MEAS - MV MEAN PG: 3 MMHG
BH CV ECHO MEAS - MV P1/2T: 51.1 MSEC
BH CV ECHO MEAS - MV V2 VTI: 33.1 CM
BH CV ECHO MEAS - MVA(P1/2T): 4.3 CM2
BH CV ECHO MEAS - MVA(VTI): 1.96 CM2
BH CV ECHO MEAS - PA ACC TIME: 0.1 SEC
BH CV ECHO MEAS - PA V2 MAX: 136 CM/SEC
BH CV ECHO MEAS - PULM A REVS DUR: 0.16 SEC
BH CV ECHO MEAS - PULM A REVS VEL: 37.6 CM/SEC
BH CV ECHO MEAS - PULM DIAS VEL: 36.7 CM/SEC
BH CV ECHO MEAS - PULM S/D: 1.28
BH CV ECHO MEAS - PULM SYS VEL: 47 CM/SEC
BH CV ECHO MEAS - RV MAX PG: 3.4 MMHG
BH CV ECHO MEAS - RV V1 MAX: 91.8 CM/SEC
BH CV ECHO MEAS - RV V1 VTI: 18.6 CM
BH CV ECHO MEAS - SV(LVOT): 65 ML
BH CV ECHO MEAS - SV(MOD-SP2): 64.8 ML
BH CV ECHO MEAS - SV(MOD-SP4): 82.2 ML
BH CV ECHO MEAS - SVI(LVOT): 31.2 ML/M2
BH CV ECHO MEAS - SVI(MOD-SP2): 31 ML/M2
BH CV ECHO MEAS - SVI(MOD-SP4): 39.4 ML/M2
BH CV ECHO MEAS - TAPSE (>1.6): 2.7 CM
BH CV ECHO MEAS - TR MAX PG: 41 MMHG
BH CV ECHO MEAS - TR MAX VEL: 320 CM/SEC
BH CV ECHO MEASUREMENTS AVERAGE E/E' RATIO: 9.9
BH CV XLRA - TDI S': 17.8 CM/SEC
BUN SERPL-MCNC: 16 MG/DL (ref 8–23)
BUN/CREAT SERPL: 17.2 (ref 7–25)
CALCIUM SPEC-SCNC: 8.5 MG/DL (ref 8.6–10.5)
CHLORIDE SERPL-SCNC: 100 MMOL/L (ref 98–107)
CO2 SERPL-SCNC: 21.9 MMOL/L (ref 22–29)
CREAT SERPL-MCNC: 0.93 MG/DL (ref 0.76–1.27)
DEPRECATED RDW RBC AUTO: 42.6 FL (ref 37–54)
EGFRCR SERPLBLD CKD-EPI 2021: 85.1 ML/MIN/1.73
EOSINOPHIL # BLD AUTO: 0.13 10*3/MM3 (ref 0–0.4)
EOSINOPHIL NFR BLD AUTO: 1 % (ref 0.3–6.2)
ERYTHROCYTE [DISTWIDTH] IN BLOOD BY AUTOMATED COUNT: 12.2 % (ref 12.3–15.4)
GLUCOSE SERPL-MCNC: 103 MG/DL (ref 65–99)
HCT VFR BLD AUTO: 38.1 % (ref 37.5–51)
HGB BLD-MCNC: 12.6 G/DL (ref 13–17.7)
IMM GRANULOCYTES # BLD AUTO: 0.08 10*3/MM3 (ref 0–0.05)
IMM GRANULOCYTES NFR BLD AUTO: 0.6 % (ref 0–0.5)
LEFT ATRIUM VOLUME INDEX: 31 ML/M2
LYMPHOCYTES # BLD AUTO: 0.52 10*3/MM3 (ref 0.7–3.1)
LYMPHOCYTES NFR BLD AUTO: 4 % (ref 19.6–45.3)
MAGNESIUM SERPL-MCNC: 2.2 MG/DL (ref 1.6–2.4)
MCH RBC QN AUTO: 31.1 PG (ref 26.6–33)
MCHC RBC AUTO-ENTMCNC: 33.1 G/DL (ref 31.5–35.7)
MCV RBC AUTO: 94.1 FL (ref 79–97)
MONOCYTES # BLD AUTO: 0.91 10*3/MM3 (ref 0.1–0.9)
MONOCYTES NFR BLD AUTO: 7 % (ref 5–12)
NEUTROPHILS NFR BLD AUTO: 11.26 10*3/MM3 (ref 1.7–7)
NEUTROPHILS NFR BLD AUTO: 87.2 % (ref 42.7–76)
NRBC BLD AUTO-RTO: 0 /100 WBC (ref 0–0.2)
PHOSPHATE SERPL-MCNC: 2.4 MG/DL (ref 2.5–4.5)
PLATELET # BLD AUTO: 325 10*3/MM3 (ref 140–450)
PMV BLD AUTO: 9.4 FL (ref 6–12)
POTASSIUM SERPL-SCNC: 4 MMOL/L (ref 3.5–5.2)
QT INTERVAL: 358 MS
QTC INTERVAL: 453 MS
RBC # BLD AUTO: 4.05 10*6/MM3 (ref 4.14–5.8)
SINUS: 2.6 CM
SODIUM SERPL-SCNC: 132 MMOL/L (ref 136–145)
STJ: 1.9 CM
VIT B12 BLD-MCNC: 406 PG/ML (ref 211–946)
WBC NRBC COR # BLD AUTO: 12.93 10*3/MM3 (ref 3.4–10.8)

## 2024-11-17 PROCEDURE — 82140 ASSAY OF AMMONIA: CPT

## 2024-11-17 PROCEDURE — 99222 1ST HOSP IP/OBS MODERATE 55: CPT

## 2024-11-17 PROCEDURE — 85025 COMPLETE CBC W/AUTO DIFF WBC: CPT

## 2024-11-17 PROCEDURE — 93306 TTE W/DOPPLER COMPLETE: CPT

## 2024-11-17 PROCEDURE — 93306 TTE W/DOPPLER COMPLETE: CPT | Performed by: INTERNAL MEDICINE

## 2024-11-17 PROCEDURE — 97162 PT EVAL MOD COMPLEX 30 MIN: CPT

## 2024-11-17 PROCEDURE — 70450 CT HEAD/BRAIN W/O DYE: CPT

## 2024-11-17 PROCEDURE — 84100 ASSAY OF PHOSPHORUS: CPT | Performed by: INTERNAL MEDICINE

## 2024-11-17 PROCEDURE — 83735 ASSAY OF MAGNESIUM: CPT | Performed by: INTERNAL MEDICINE

## 2024-11-17 PROCEDURE — 25010000002 CEFTRIAXONE PER 250 MG: Performed by: INTERNAL MEDICINE

## 2024-11-17 PROCEDURE — 82607 VITAMIN B-12: CPT

## 2024-11-17 PROCEDURE — 80048 BASIC METABOLIC PNL TOTAL CA: CPT

## 2024-11-17 PROCEDURE — 97166 OT EVAL MOD COMPLEX 45 MIN: CPT

## 2024-11-17 PROCEDURE — 25010000002 HEPARIN (PORCINE) PER 1000 UNITS: Performed by: INTERNAL MEDICINE

## 2024-11-17 RX ORDER — QUETIAPINE FUMARATE 25 MG/1
25 TABLET, FILM COATED ORAL NIGHTLY
Status: DISCONTINUED | OUTPATIENT
Start: 2024-11-17 | End: 2024-11-23 | Stop reason: HOSPADM

## 2024-11-17 RX ADMIN — ALPRAZOLAM 0.25 MG: 0.25 TABLET ORAL at 00:01

## 2024-11-17 RX ADMIN — HEPARIN SODIUM 5000 UNITS: 5000 INJECTION INTRAVENOUS; SUBCUTANEOUS at 08:49

## 2024-11-17 RX ADMIN — TRAZODONE HYDROCHLORIDE 50 MG: 50 TABLET ORAL at 00:01

## 2024-11-17 RX ADMIN — HEPARIN SODIUM 5000 UNITS: 5000 INJECTION INTRAVENOUS; SUBCUTANEOUS at 20:31

## 2024-11-17 RX ADMIN — QUETIAPINE FUMARATE 25 MG: 25 TABLET ORAL at 20:30

## 2024-11-17 RX ADMIN — Medication 10 ML: at 20:30

## 2024-11-17 RX ADMIN — ACETAMINOPHEN 650 MG: 325 TABLET, FILM COATED ORAL at 02:00

## 2024-11-17 RX ADMIN — LOSARTAN POTASSIUM 50 MG: 50 TABLET, FILM COATED ORAL at 08:49

## 2024-11-17 RX ADMIN — CEFTRIAXONE 2000 MG: 2 INJECTION, POWDER, FOR SOLUTION INTRAMUSCULAR; INTRAVENOUS at 14:17

## 2024-11-17 RX ADMIN — Medication 10 ML: at 08:49

## 2024-11-17 NOTE — PROGRESS NOTES
[facility] MEDICINE SERVICE  DAILY PROGRESS NOTE    NAME: Roger Nguyen  : 1950  MRN: [id number]      LOS: 2 days     PROVIDER OF SERVICE: Latia Amanda MD    Chief Complaint: Cellulitis    Subjective:     Interval History:  History taken from: patient    Patient seen and examined at bedside this morning.  No acute complaints overnight.  There was concerns yesterday that he was possibly getting disoriented in the evening however this morning patient was alert and oriented x 3.  He had a CT scan of his head done also for disorientation.  I suspect this is more related to grief as his wife just passed away a few days ago, will have psychiatry see him        Review of Systems: Negative except as described above  Review of Systems    Objective:     Vital Signs  Temp:  [97.2 °F (36.2 °C)-98 °F (36.7 °C)] 98 °F (36.7 °C)  Heart Rate:  [] 82  Resp:  [15-27] 15  BP: (139-153)/() 139/67   Body mass index is 30.34 kg/m².    Physical Exam  Physical Exam  Constitutional:       Comments: NAD    Cardiovascular:      Comments:  RRR, S1 & S2   Pulmonary:      Comments:  Lungs CTA   Abdominal:      Comments:  ABD soft, NT            Diagnostic Data    Results from last 7 days   Lab Units 24  0040 24  0006 11/15/24  1208   WBC 10*3/mm3 12.93*   < > 11.74*   HEMOGLOBIN g/dL 12.6*   < > 13.5   HEMATOCRIT % 38.1   < > 41.2   PLATELETS 10*3/mm3 325   < > 352   GLUCOSE mg/dL 103*   < > 87   CREATININE mg/dL 0.93   < > 1.12   BUN mg/dL 16   < > 24*   SODIUM mmol/L 132*   < > 138   POTASSIUM mmol/L 4.0   < > 4.1   AST (SGOT) U/L  --   --  26   ALT (SGPT) U/L  --   --  22   ALK PHOS U/L  --   --  98   BILIRUBIN mg/dL  --   --  0.4   ANION GAP mmol/L 10.1   < > 9.1    < > = values in this interval not displayed.       CT Abdomen Pelvis With & Without Contrast    Result Date: 2024  Impression: 1.No acute abnormality identified within the abdomen or pelvis. 2.No hydronephrosis or urinary  tract calculi identified. 3.Bilateral renal cysts and subcentimeter hypodensities too small to characterize. Largest cyst within the left kidney measures approximately 5.8 cm and may contain a fine septation. Probable tiny hemorrhagic cyst at the upper pole of the right kidney. No suspicious enhancing renal lesions are seen. Excretory phase images show no suspicious filling defects within the opacified urinary tract. 4.Colonic diverticulosis. 5.Trace bilateral pleural effusions with bibasilar atelectasis. Electronically Signed: Darrin Mcgovern MD  11/16/2024 7:05 PM EST  Workstation ID: [id number]    XR Chest 1 View    Result Date: 11/16/2024  Impression: Cardiomegaly with central pulmonary vascular congestion. Electronically Signed: Morro Vanegas MD  11/16/2024 1:08 PM EST  Workstation ID: [id number]    CT Lower Extremity Bilateral With Contrast    Result Date: 11/15/2024  Impression: Subcutaneous soft tissue edema involving both lower extremities from the knees to the feet, nonspecific, but may represent cellulitis in the appropriate clinical context. No organized fluid collection or visible soft tissue gas. No radiographic evidence of acute osteomyelitis. Electronically Signed: Nando Cesar  11/15/2024 7:20 PM EST  Workstation ID: [id number]       I reviewed the patient's new clinical results.    Assessment/Plan:     Active and Resolved Problems  Active Hospital Problems    Diagnosis  POA    **Cellulitis [L03.90]  Yes      Resolved Hospital Problems   No resolved problems to display.       #Bilateral lower extremity swelling concerning for cellulitis  -Patient presented with worsened bilateral lower extremity swelling pain and redness.  Per the patient the bilateral lower extremity swelling has been ongoing for a few years and it gradually worsened.  -CRP elevated however Pro-Vahe WNL  -MRSA negative  -TSH WNL  - TTE pending,   -CXR showed cardiomegaly with central pulmonary vascular congestion   BNP elevated, patient  is currently breathing comfortably on room air  -Bilateral venous Doppler negative for DVT  -CT lower extremity with contrast shows subcutaneous soft tissue edema in bilateral lower extremities.  Unlikely this looks like cellulitis as patient does not have any tenderness   -continue patient on Rocephin for now  -Wound culture negative  -wound care consulted  -PT OT to see      Hematuria  -Blood seen on 2 different UAs  Urology consulted, ordered CT scan hematuria protocol and state will do a bedside cystoscopy on Monday  Urine culture Show no growth to date     #Hypertension  - DC amlodipine in the setting of bilateral lower extremity edema  -Start patient on losartan and hydralazine as needed     #Social issue  -Will need PCP.  Will need blood pressure medication prescription on discharge.       VTE Prophylaxis:  Pharmacologic VTE prophylaxis orders are present.            Time: 35 minutes     There are no questions and answers to display.       Signature: Electronically signed by Latia Amanda MD, 11/17/24, 12:28 EST.  [facility]ist Team

## 2024-11-17 NOTE — PLAN OF CARE
Goal Outcome Evaluation:  Plan of Care Reviewed With: patient           Outcome Evaluation: 73 y/o male admitted on 11/15 due o BLE swelling, redness. (-) DVT. Patient had episode of hematuria while in hospital as well as confusion. Patient's spouse has recently passed away. PMH includes htn, anxiety. Patient lives alone in [facility] with 4[facility]. Patient normally managed own household activities including grocery shopping and reports using quad cane for mobility in home. At time of eval, patient is alert , oriented x 4 however noted with short term memory deficits. Patient was unable to recall where to ambulated when instructed prior to standing. Patient needed min/mod A of 1 for supine to sit with verbal and tactile cues . Mod A to come to standing due to posterior leaning with immediate stance and patient unable to use quad cane correctly. Ambulated 16 ft with min/mod A of 1-2 ; patient very unsteady with quad cane alone, improved with use of bed rail on other side however patient just carried his quad cane with other hand. Improved gait with HHA of 2; will trial with rw on next session. Patient is not safe to return home alone at this time. Recommend SNF for continued balance and mobility training.

## 2024-11-17 NOTE — CONSULTS
Referring Provider: Latia Amanda MD  Reason for Consultation: grief, hallucinations       Chief complaint : visual hallucination overnight     Subjective .     History of present illness:  The patient is a 74 y.o. male who was admitted secondary to bilateral lower extremity cellulitis.     PMH: anxiety, hypertension     Psychiatry was consulted due to grief and hallucinations.    The patient was seen this afternoon. He was alert and oriented, pleasant. Patient states initially when he got to the hospital, he was feeling really depressed, but he says he is now feeling more optimistic. He had family come to visit, which he says lifted his spirits as well.     Patient reports recently losing his wife of 20 years, here at this hospital. So, initially, when he first got here, he said he had a lot of the memories come back of her being at the hospital.     Patient reports he generally sleeps well, however last night he did not sleep as well. He also reports one instance of a visual hallucination overnight. He reported seeing his nephew and his nephew's daughter outside his window. He said he looked again and they were gone. This was the only instance of this. He denies any visual hallucinations. He denies anything like this happening before.     Patient has history of depression, but reports it was in the 1960s. He says for a time, he was on thorazine. Per chart review, he was previously on Wellbutrin XL 150mg in the past as well. Patient states he does not currently have a primary care provider and needs a referral to one.     He was agreeable to start quetiapine at night for sleep and possible delirium. Patient states he doesn't feel as though he needs something for depression right now. I discussed the stages of grief with him, and advised him to reach out to his provider if he felt depression coming on or worsening in the weeks to come after losing his wife, as this wouldn't be uncommon. He expressed  understanding. Denies any SI/HI. States he has good family support.     Review of Systems   All systems were reviewed and negative except for:  Behavioral/Psych: positive for  hallucinations    The following portions of the patient's history were reviewed and updated as appropriate: allergies, current medications, past family history, past medical history, past social history, past surgical history and problem list.    History    Past psychiatric history : depression, anxiety     History reviewed. No pertinent past medical history.     History reviewed. No pertinent family history.     Social History     Tobacco Use    Smoking status: Never    Smokeless tobacco: Never   Vaping Use    Vaping status: Never Used   Substance Use Topics    Alcohol use: Never    Drug use: Never          Medications Prior to Admission   Medication Sig Dispense Refill Last Dose/Taking    VITAMIN D, CHOLECALCIFEROL, PO Take 1 capsule by mouth Daily.   11/15/2024    buPROPion XL (WELLBUTRIN XL) 150 MG 24 hr tablet TAKE ONE TABLET BY MOUTH DAILY (Patient not taking: Reported on 11/15/2024) 30 tablet 5 Not Taking    lisinopril-hydrochlorothiazide (PRINZIDE,ZESTORETIC) 20-12.5 MG per tablet TAKE ONE TABLET BY MOUTH DAILY (Patient not taking: Reported on 11/15/2024) 30 tablet 4 Not Taking    vitamin D (ERGOCALCIFEROL) 1.25 MG (80039 UT) capsule capsule TAKE ONE CAPSULE BY MOUTH ONCE WEEKLY (Patient not taking: Reported on 11/15/2024) 12 capsule 0 Not Taking        Scheduled Meds:  cefTRIAXone, 2,000 mg, Intravenous, Q24H  heparin (porcine), 5,000 Units, Subcutaneous, Q12H  iopamidol, 100 mL, Intravenous, Once in imaging  losartan, 50 mg, Oral, Q24H  sodium chloride, 10 mL, Intravenous, Q12H         Continuous Infusions:       PRN Meds:    acetaminophen **OR** acetaminophen **OR** acetaminophen    ALPRAZolam    aluminum-magnesium hydroxide-simethicone    senna-docusate sodium **AND** polyethylene glycol **AND** bisacodyl **AND** bisacodyl     "Calcium Replacement - Follow Nurse / BPA Driven Protocol    hydrALAZINE    Magnesium Cardiology Dose Replacement - Follow Nurse / BPA Driven Protocol    melatonin    nitroglycerin    ondansetron ODT **OR** ondansetron    Phosphorus Replacement - Follow Nurse / BPA Driven Protocol    Potassium Replacement - Follow Nurse / BPA Driven Protocol    [COMPLETED] Insert Peripheral IV **AND** sodium chloride    sodium chloride    sodium chloride    traZODone      Allergies:  Patient has no known allergies.      Objective     Vital Signs   /67 (BP Location: Right arm, Patient Position: Lying)   Pulse 82   Temp 98 °F (36.7 °C) (Axillary)   Resp 15   Ht 175.3 cm (69\")   Wt 93.2 kg (205 lb 7.5 oz)   SpO2 97%   BMI 30.34 kg/m²     Physical Exam:    Musculoskeletal:   Muscle strength and tone: equal bilaterally   Abnormal Movements: None noted.   Gait: BERNABE, patient in bed.      General Appearance:    In bed, in NAD.      MENTAL STATUS EXAM   General Appearance:  Cleanly groomed and dressed  Eye Contact:  Good eye contact  Attitude:  Cooperative  Motor Activity:  Normal gait, posture  Muscle Strength:  Normal  Speech:  Normal rate, tone, volume  Language:  Spontaneous  Mood and affect:  Normal, pleasant and depressed  Hopelessness:  Denies  Loneliness: Denies  Thought Process:  Logical and goal-directed  Associations/ Thought Content:  No delusions  Hallucinations:  Other  Other Comment:  One episode of visual hallucination overnight. Patient reports no others since then.  Suicidal Ideations:  Not present  Homicidal Ideation:  Not present  Sensorium:  Alert  Orientation:  Person, place and time  Immediate Recall, Recent, and Remote Memory:  Intact  Attention Span/ Concentration:  Good  Fund of Knowledge:  Appropriate for age and educational level  Intellectual Functioning:  Average range  Insight:  Fair  Judgement:  Fair  Reliability:  Fair  Impulse Control:  Good       Medications and allergies reviewed.    Result " Review:  I have personally reviewed the results from the time of this admission to 11/17/2024 15:23 EST and agree with these findings:  [x]  Laboratory  []  Microbiology  []  Radiology  [x]  EKG/Telemetry   []  Cardiology/Vascular   []  Pathology  []  Old records  []  Other:  Most notable findings include:     Assessment & Plan       Cellulitis    Assessment: acute grief, possible delirium due to poor sleep   Treatment Plan: Patient presents with grief related to passing of his wife. Also reports history of depression. Patient reported not sleeping well last night and one episode of visual hallucination, seeing a family member outside his window who he knew wasn't there. He reports that is the only instance.     Will order quetiapine 25mg nightly for him for sleep and hallucinations.     Educated patient on depression and grief. He states right now he doesn't want to start something specifically for depression, but he was willing to reach out in the future if this develops or worsens. He will need a referral for a primary care provider. Will let  know.     Continue supportive treatment.     Will follow as needed.   Treatment Plan discussed with: Patient    I discussed the patients findings and my recommendations with patient and social work     I have reviewed and approved the behavioral health treatment plans and problem list. Yes  Thank you for the consult   Referring MD has access to consult report and progress notes in EMR     ABDIEL Ortiz  11/17/24  15:23 EST

## 2024-11-17 NOTE — THERAPY EVALUATION
Patient Name: Roger Nguyen  : 1950    MRN: [id number]                              Today's Date: 2024       Admit Date: 11/15/2024    Visit Dx:     ICD-10-CM ICD-9-CM   1. Bilateral lower leg cellulitis  L03.116 682.6    L03.115      Patient Active Problem List   Diagnosis    Cellulitis     History reviewed. No pertinent past medical history.  History reviewed. No pertinent surgical history.   General Information       Row Name 24 164          Physical Therapy Time and Intention    Document Type evaluation  -CR     Mode of Treatment physical therapy  -CR       Row Name 24 1642          General Information    Patient Profile Reviewed yes  -CR     Prior Level of Function independent:;all household mobility;community mobility  using quad cane that belonged to late wife  -CR     Existing Precautions/Restrictions fall  -CR     Barriers to Rehab cognitive status;medically complex  -CR       Row Name 24 164          Living Environment    People in Home alone  -CR       Row Name 24 1642          Home Main Entrance    Number of Stairs, Main Entrance four  -CR     Stair Railings, Main Entrance railings safe and in good condition  -CR       Row Name 24 164          Cognition    Orientation Status (Cognition) oriented x 4  -CR       Row Name 24 164          Safety Issues/Impairments Affecting Functional Mobility    Safety Issues Affecting Function (Mobility) at risk behavior observed;awareness of need for assistance;insight into deficits/self-awareness;positioning of assistive device;sequencing abilities  -CR     Impairments Affecting Function (Mobility) balance;cognition;endurance/activity tolerance;strength  -CR               User Key  (r) = Recorded By, (t) = Taken By, (c) = Cosigned By      Initials Name Provider Type    CR Reyes, Carmela, PT Physical Therapist                   Mobility       Row Name 24 1643          Bed Mobility    Bed Mobility supine-sit  -CR      Supine-Sit Whitley (Bed Mobility) minimum assist (75% patient effort);moderate assist (50% patient effort);verbal cues  -CR     Assistive Device (Bed Mobility) head of bed elevated;bed rails  -CR       Row Name 11/17/24 1643          Bed-Chair Transfer    Bed-Chair Whitley (Transfers) contact guard;verbal cues  -CR     Assistive Device (Bed-Chair Transfers) walker, front-wheeled  -CR       Row Name 11/17/24 1643          Sit-Stand Transfer    Sit-Stand Whitley (Transfers) moderate assist (50% patient effort)  -CR     Assistive Device (Sit-Stand Transfers) cane, quad  -CR     Comment, (Sit-Stand Transfer) posterior leaning upon immediate standing  -CR       Row Name 11/17/24 1643          Gait/Stairs (Locomotion)    Whitley Level (Gait) minimum assist (75% patient effort);2 person assist  -CR     Assistive Device (Gait) cane, quad  HHA  -CR     Distance in Feet (Gait) 16  -CR     Deviations/Abnormal Patterns (Gait) gait speed decreased;ataxic  -CR     Bilateral Gait Deviations forward flexed posture  -CR               User Key  (r) = Recorded By, (t) = Taken By, (c) = Cosigned By      Initials Name Provider Type    CR Reyes, Carmela, PT Physical Therapist                   Obj/Interventions       Row Name 11/17/24 1644          Range of Motion Comprehensive    Comment, General Range of Motion lacking terminal knee extension both  -CR       Row Name 11/17/24 1644          Strength Comprehensive (MMT)    Comment, General Manual Muscle Testing (MMT) Assessment BLEgrossly 3+/5  -CR       Row Name 11/17/24 1644          Balance    Balance Assessment standing static balance;standing dynamic balance  -CR     Static Standing Balance minimal assist;2-person assist  -CR     Dynamic Standing Balance minimal assist;moderate assist;2-person assist  -CR     Position/Device Used, Standing Balance supported  -CR               User Key  (r) = Recorded By, (t) = Taken By, (c) = Cosigned By      Initials Name  Provider Type    CR Reyes, Carmela, PT Physical Therapist                   Goals/Plan       Row Name 11/17/24 1169          Transfer Goal 1 (PT)    Activity/Assistive Device (Transfer Goal 1, PT) transfers, all;walker, rolling  -CR     [place] Level/Cues Needed (Transfer Goal 1, PT) contact guard required  -CR     Time Frame (Transfer Goal 1, PT) long term goal (LTG);2 weeks  -CR       Row Name 11/17/24 1659          Gait Training Goal 1 (PT)    Activity/Assistive Device (Gait Training Goal 1, PT) gait (walking locomotion);assistive device use;improve balance and speed;decrease fall risk;walker, rolling  -CR     [place] Level (Gait Training Goal 1, PT) contact guard required  -CR     Distance (Gait Training Goal 1, PT) 40  -CR     Time Frame (Gait Training Goal 1, PT) long term goal (LTG);2 weeks  -CR       Row Name 11/17/24 165          Therapy Assessment/Plan (PT)    Planned Therapy Interventions (PT) balance training;bed mobility training;gait training;home exercise program;patient/family education;stair training;transfer training;neuromuscular re-education;strengthening  -CR               User Key  (r) = Recorded By, (t) = Taken By, (c) = Cosigned By      Initials Name Provider Type    CR Reyes, Carmela, PT Physical Therapist                   Clinical Impression       Row Name 11/17/24 8803          Pain    Pretreatment Pain Rating 3/10  -CR     Posttreatment Pain Rating 3/10  -CR     Pain Location hip;knee  -CR     Pain Side/Orientation bilateral  -CR       Row Name 11/17/24 9233          Plan of Care Review    Plan of Care Reviewed With patient  -CR     Outcome Evaluation 75 y/o male admitted on 11/15 due o BLE swelling, redness. (-) DVT. Patient had episode of hematuria while in hospital as well as confusion. Patient's spouse has recently passed away. PMH includes htn, anxiety. Patient lives alone in [facility] with 4STE. Patient normally managed own household activities including grocery shopping and  reports using quad cane for mobility in home. At time of eval, patient is alert , oriented x 4 however noted with short term memory deficits. Patient was unable to recall where to ambulated when instructed prior to standing. Patient needed min/mod A of 1 for supine to sit with verbal and tactile cues . Mod A to come to standing due to posterior leaning with immediate stance and patient unable to use quad cane correctly. Ambulated 16 ft with min/mod A of 1-2 ; patient very unsteady with quad cane alone, improved with use of bed rail on other side however patient just carried his quad cane with other hand. Improved gait with HHA of 2; will trial with rw on next session. Patient is not safe to return home alone at this time. Recommend SNF for continued balance and mobility training.  -CR       Row Name 11/17/24 9539 11/17/24 3036       Therapy Assessment/Plan (PT)    Patient/Family Therapy Goals Statement (PT) -- get better  -CR    Rehab Potential (PT) good  -CR good  -CR    Criteria for Skilled Interventions Met (PT) yes;skilled treatment is necessary  -CR yes;skilled treatment is necessary  -CR    Therapy Frequency (PT) 5 times/wk  -CR 5 times/wk  -CR    Predicted Duration of Therapy Intervention (PT) -- dc  -CR      Row Name 11/17/24 0216          Positioning and Restraints    Post Treatment Position chair  -CR     In Chair notified nsg;sitting;reclined;call light within reach  -CR               User Key  (r) = Recorded By, (t) = Taken By, (c) = Cosigned By      Initials Name Provider Type    CR Reyes, Carmela, PT Physical Therapist                   Outcome Measures       Row Name 11/17/24 3322          How much help from another person do you currently need...    Turning from your back to your side while in flat bed without using bedrails? 3  -CR     Moving from lying on back to sitting on the side of a flat bed without bedrails? 2  -CR     Moving to and from a bed to a chair (including a wheelchair)? 3  -CR      Standing up from a chair using your arms (e.g., wheelchair, bedside chair)? 2  -CR     Climbing 3-5 steps with a railing? 2  -CR     To walk in hospital room? 2  -CR     AM-PAC 6 Clicks Score (PT) 14  -CR       Row Name 11/17/24 1609          Functional Assessment    Outcome Measure Options AM-PAC 6 Clicks Daily Activity (OT)  -SP               User Key  (r) = Recorded By, (t) = Taken By, (c) = Cosigned By      Initials Name Provider Type    CR Reyes, Carmela, PT Physical Therapist    SP Thad Madden, OT Occupational Therapist                                 Physical Therapy Education       Title: PT OT SLP Therapies (In Progress)       Topic: Physical Therapy (In Progress)       Point: Mobility training (In Progress)       Learning Progress Summary            Patient Acceptance, E, NR by CR at 11/17/2024 2138                      Point: Home exercise program (Not Started)       Learner Progress:  Not documented in this visit.              Point: Body mechanics (Not Started)       Learner Progress:  Not documented in this visit.              Point: Precautions (Not Started)       Learner Progress:  Not documented in this visit.                              User Key       Initials Effective Dates Name Provider Type Discipline    CR 06/16/21 -  Reyes, Carmela, PT Physical Therapist PT                  PT Recommendation and Plan  Planned Therapy Interventions (PT): balance training, bed mobility training, gait training, home exercise program, patient/family education, stair training, transfer training, neuromuscular re-education, strengthening  Outcome Evaluation: 75 y/o male admitted on 11/15 due o BLE swelling, redness. (-) DVT. Patient had episode of hematuria while in hospital as well as confusion. Patient's spouse has recently passed away. PMH includes htn, anxiety. Patient lives alone in [facility] with 4STE. Patient normally managed own household activities including grocery shopping and reports using quad cane for  mobility in home. At time of eval, patient is alert , oriented x 4 however noted with short term memory deficits. Patient was unable to recall where to ambulated when instructed prior to standing. Patient needed min/mod A of 1 for supine to sit with verbal and tactile cues . Mod A to come to standing due to posterior leaning with immediate stance and patient unable to use quad cane correctly. Ambulated 16 ft with min/mod A of 1-2 ; patient very unsteady with quad cane alone, improved with use of bed rail on other side however patient just carried his quad cane with other hand. Improved gait with HHA of 2; will trial with rw on next session. Patient is not safe to return home alone at this time. Recommend SNF for continued balance and mobility training.     Time Calculation:         PT Charges       Row Name 11/17/24 1656             Time Calculation    Start Time 1520  -CR      Stop Time 1554  -CR      Time Calculation (min) 34 min  -CR      PT Received On 11/17/24  -CR      PT - Next Appointment 11/18/24  -CR      PT Goal Re-Cert Due Date 12/01/24  -CR         Time Calculation- PT    Total Timed Code Minutes- PT 0 minute(s)  -CR                User Key  (r) = Recorded By, (t) = Taken By, (c) = Cosigned By      Initials Name Provider Type    CR Reyes, Carmela, PT Physical Therapist                  Therapy Charges for Today       Code Description Service Date Service Provider Modifiers Qty    [id number]  PT EVAL MOD COMPLEXITY 4 11/17/2024 Reyes, Carmela, PT GP 1            PT G-Codes  Outcome Measure Options: AM-PAC 6 Clicks Daily Activity (OT)  AM-PAC 6 Clicks Score (PT): 14  AM-PAC 6 Clicks Score (OT): 17  PT Discharge Summary  Anticipated Discharge Disposition (PT): skilled nursing facility    Carmela Reyes, PT  11/17/2024

## 2024-11-17 NOTE — PLAN OF CARE
Goal Outcome Evaluation:              Outcome Evaluation: Pt has been resting comfortably throughout the shift. He is on room air and has IV abx ordered. Pt has had family frequently in the room to support him throughout the day. He has bouts of confusion. He got a CT and Echo done today and tolerated the procedures well. Currently stable with no complaints at this time.

## 2024-11-17 NOTE — PLAN OF CARE
Goal Outcome Evaluation:      Pt becoming more confused overnight.

## 2024-11-17 NOTE — PLAN OF CARE
Goal Outcome Evaluation:  Plan of Care Reviewed With: patient    Outcome Evaluation: Pt is a 75 y/o male admitted to [facility] on 11/15/24 with c/o cellulitis. CT (-). Duplex (-) for DVT. Psych consulted as pt exhibits confusion overnight and grieving wife who recently passed. PMHx significant for anxiety and HTN. At baseline, pt resides alone in a H with x4 ENDY. Pt reports he is IND in I/ADLs and actively driving. He uses quad cane within home and reports he is able to push a grocery cart at the store. Time of evaluation, pt A&O x4 with 3/10 pain in BLEs. Pt completes bed mobility with min/mod assist and comes to standing with mod assist, noted with posterior lean requiring assist and verbal cueing to correct. Pt transfers to chair with min assist. Attempts to utilize quad cane, however difficulty sequencing task. SBT administered, indicated impaired cognition. Pt appears to have difficulty with short term memory recall this date. Pt noted with deficits in cognitive processing with diminished safety awareness and increased confusion adversely impacting independence and safety with ADLs and warranting the need for skilled OT intervention in order to progress improved ADL performance. OT recommending SNF at this time.    Anticipated Discharge Disposition (OT): skilled nursing facility

## 2024-11-17 NOTE — THERAPY EVALUATION
Patient Name: Roger Nguyen  : 1950    MRN: [id number]                              Today's Date: 2024       Admit Date: 11/15/2024    Visit Dx:     ICD-10-CM ICD-9-CM   1. Bilateral lower leg cellulitis  L03.116 682.6    L03.115      Patient Active Problem List   Diagnosis    Cellulitis     History reviewed. No pertinent past medical history.  History reviewed. No pertinent surgical history.   General Information       Row Name 24 1550 24 1459       OT Time and Intention    Document Type evaluation  -SP --  -SP    Mode of Treatment occupational therapy  -SP --  -SP      Row Name 24 1550 24 1459       General Information    Patient Profile Reviewed yes  -SP yes  -SP    Prior Level of Function independent:;ADL's;driving;home management  -SP --  -SP    Barriers to Rehab medically complex  -SP --      Row Name 24 1550          Living Environment    People in Home alone  -SP       Row Name 24 1550          Home Main Entrance    Number of Stairs, Main Entrance four  -SP       Row Name 24 1550          Stairs Within Home, Primary    Number of Stairs, Within Home, Primary none  -SP       Row Name 24 1550          Cognition    Orientation Status (Cognition) oriented x 4  -SP       Row Name 24 1550          Safety Issues/Impairments Affecting Functional Mobility    Safety Issues Affecting Function (Mobility) awareness of need for assistance;insight into deficits/self-awareness;judgment;positioning of assistive device;problem-solving;sequencing abilities  -SP     Impairments Affecting Function (Mobility) balance;cognition;endurance/activity tolerance;strength  -SP     Cognitive Impairments, Mobility Safety/Performance awareness, need for assistance;insight into deficits/self-awareness;judgment;problem-solving/reasoning;safety precaution awareness;sequencing abilities  -SP               User Key  (r) = Recorded By, (t) = Taken By, (c) = Cosigned By       Initials Name Provider Type    SP Thad Madden OT Occupational Therapist                     Mobility/ADL's       Row Name 11/17/24 1602          Bed Mobility    Bed Mobility bed mobility (all) activities  -SP     All Activities, [place] (Bed Mobility) minimum assist (75% patient effort);moderate assist (50% patient effort)  -SP       Row Name 11/17/24 1602          Transfers    Transfers sit-stand transfer;bed-chair transfer  -SP       Row Name 11/17/24 1602          Sit-Stand Transfer    Sit-Stand [place] (Transfers) moderate assist (50% patient effort)  -SP     Comment, (Sit-Stand Transfer) Pt noted with posterior lean requires assist and verbal cueing to correct, verbaal cues for head/neck extension  -SP       Row Name 11/17/24 1602          Functional Mobility    Functional Mobility- Ind. Level minimum assist (75% patient effort);2 person assist required  -SP     Patient was able to Ambulate yes  -SP       Row Name 11/17/24 1602          Activities of Daily Living    BADL Assessment/Intervention lower body dressing  -SP       Row Name 11/17/24 1602          Lower Body Dressing Assessment/Training    [place] Level (Lower Body Dressing) don;shoes/slippers;moderate assist (50% patient effort)  -SP               User Key  (r) = Recorded By, (t) = Taken By, (c) = Cosigned By      Initials Name Provider Type    SP Thad Madden OT Occupational Therapist                   Obj/Interventions       Row Name 11/17/24 1604          Sensory Assessment (Somatosensory)    Sensory Assessment (Somatosensory) UE sensation intact  -SP       Row Name 11/17/24 1604          Range of Motion Comprehensive    General Range of Motion bilateral upper extremity ROM WFL  -SP       Row Name 11/17/24 1604          Strength Comprehensive (MMT)    General Manual Muscle Testing (MMT) Assessment no strength deficits identified  -SP     Comment, General Manual Muscle Testing (MMT) Assessment BUE grossly WFLs  -       Row  Name 11/17/24 1604          Balance    Balance Assessment sitting static balance;sit to stand dynamic balance;standing static balance  -SP     Static Sitting Balance standby assist  -SP     Position, Sitting Balance unsupported;sitting edge of bed  -SP     Sit to Stand Dynamic Balance moderate assist  -SP     Static Standing Balance minimal assist;2-person assist  -SP               User Key  (r) = Recorded By, (t) = Taken By, (c) = Cosigned By      Initials Name Provider Type    SP Thad Madden, OT Occupational Therapist                   Goals/Plan       Row Name 11/17/24 1606          Bathing Goal 1 (OT)    Activity/Device (Bathing Goal 1, OT) bathing skills, all  -SP     [place] Level/Cues Needed (Bathing Goal 1, OT) minimum assist (75% or more patient effort)  -SP     Time Frame (Bathing Goal 1, OT) 2 weeks  -SP     Strategies/Barriers (Bathing Goal 1, OT) until d/c  -SP       Row Name 11/17/24 1606          Dressing Goal 1 (OT)    Activity/Device (Dressing Goal 1, OT) dressing skills, all  -SP     [place]/Cues Needed (Dressing Goal 1, OT) minimum assist (75% or more patient effort)  -SP     Time Frame (Dressing Goal 1, OT) 2 weeks  -SP     Strategies/Barriers (Dressing Goal 1, OT) until d/c  -SP       Row Name 11/17/24 1606          Toileting Goal 1 (OT)    Activity/Device (Toileting Goal 1, OT) toileting skills, all  -SP     [place] Level/Cues Needed (Toileting Goal 1, OT) minimum assist (75% or more patient effort)  -SP     Time Frame (Toileting Goal 1, OT) 2 weeks  -SP     Strategies/Barriers (Toileting Goal 1, OT) until d/c  -SP       Row Name 11/17/24 1606          Therapy Assessment/Plan (OT)    Planned Therapy Interventions (OT) activity tolerance training;BADL retraining;IADL retraining;occupation/activity based interventions;patient/caregiver education/training;ROM/therapeutic exercise;strengthening exercise;transfer/mobility retraining  -SP               User Key  (r) = Recorded By,  (t) = Taken By, (c) = Cosigned By      Initials Name Provider Type    SP Thad Madden, GUDELIA Occupational Therapist                   Clinical Impression       Row Name 11/17/24 8969          Pain Assessment    Pretreatment Pain Rating 3/10  -SP     Posttreatment Pain Rating 3/10  -SP     Pain Location extremity  -SP     Pain Side/Orientation bilateral;lower  -SP       Row Name 11/17/24 0408          Plan of Care Review    Plan of Care Reviewed With patient  -SP     Outcome Evaluation Pt is a 73 y/o male admitted to [facility] on 11/15/24 with c/o cellulitis. CT (-). Duplex (-) for DVT. Psych consulted as pt exhibits confusion overnight and grieving wife who recently passed. PMHx significant for anxiety and HTN. At baseline, pt resides alone in a H with x4 ENDY. Pt reports he is IND in I/ADLs and actively driving. He uses quad cane within home and reports he is able to push a grocery cart at the store. Time of evaluation, pt A&O x4 with 3/10 pain in BLEs. Pt completes bed mobility with min/mod assist and comes to standing with mod assist, noted with posterior lean requiring assist and verbal cueing to correct. Pt transfers to chair with min assist. Attempts to utilize quad cane, however difficulty sequencing task. SBT administered, indicated impaired cognition. Pt appears to have difficulty with short term memory recall this date. Pt noted with deficits in cognitive processing with diminished safety awareness and increased confusion adversely impacting independence and safety with ADLs and warranting the need for skilled OT intervention in order to progress improved ADL performance. OT recommending SNF at this time.  -SP       Row Name 11/17/24 1823          Therapy Assessment/Plan (OT)    Criteria for Skilled Therapeutic Interventions Met (OT) yes;meets criteria;skilled treatment is necessary  -SP     Therapy Frequency (OT) 5 times/wk  -SP     Predicted Duration of Therapy Intervention (OT) until d/c  -SP       Row Name  11/17/24 1604          Therapy Plan Review/Discharge Plan (OT)    Anticipated Discharge Disposition (OT) skilled nursing facility  -SP       Row Name 11/17/24 1604          Vital Signs    Pre Systolic BP Rehab 144  -SP     Pre Treatment Diastolic BP 78  -SP     O2 Delivery Pre Treatment room air  -SP     O2 Delivery Intra Treatment room air  -SP     O2 Delivery Post Treatment room air  -SP     Pre Patient Position Supine  -SP     Intra Patient Position Standing  -SP     Post Patient Position Sitting  -SP       Row Name 11/17/24 1604          Positioning and Restraints    Pre-Treatment Position in bed  -SP     Post Treatment Position chair  -SP     In Chair notified nsg;reclined;call light within reach;encouraged to call for assist;exit alarm on  -SP               User Key  (r) = Recorded By, (t) = Taken By, (c) = Cosigned By      Initials Name Provider Type    Thad Galdamez OT Occupational Therapist                   Outcome Measures       Row Name 11/17/24 1609          How much help from another is currently needed...    Putting on and taking off regular lower body clothing? 2  -SP     Bathing (including washing, rinsing, and drying) 3  -SP     Toileting (which includes using toilet bed pan or urinal) 3  -SP     Putting on and taking off regular upper body clothing 3  -SP     Taking care of personal grooming (such as brushing teeth) 3  -SP     Eating meals 3  -SP     AM-PAC 6 Clicks Score (OT) 17  -SP       Row Name 11/17/24 1609          Functional Assessment    Outcome Measure Options AM-PAC 6 Clicks Daily Activity (OT)  -SP               User Key  (r) = Recorded By, (t) = Taken By, (c) = Cosigned By      Initials Name Provider Type    Thad Galdamez OT Occupational Therapist                    Occupational Therapy Education       Title: PT OT SLP Therapies (In Progress)       Topic: Occupational Therapy (In Progress)       Point: ADL training (Done)       Description:   Instruct learner(s) on proper  safety adaptation and remediation techniques during self care or transfers.   Instruct in proper use of assistive devices.                  Learning Progress Summary            Patient Acceptance, E,TB, VU by SP at 11/17/2024 1610                      Point: Home exercise program (Not Started)       Description:   Instruct learner(s) on appropriate technique for monitoring, assisting and/or progressing therapeutic exercises/activities.                  Learner Progress:  Not documented in this visit.              Point: Precautions (Not Started)       Description:   Instruct learner(s) on prescribed precautions during self-care and functional transfers.                  Learner Progress:  Not documented in this visit.              Point: Body mechanics (Not Started)       Description:   Instruct learner(s) on proper positioning and spine alignment during self-care, functional mobility activities and/or exercises.                  Learner Progress:  Not documented in this visit.                              User Key       Initials Effective Dates Name Provider Type Discipline    [place] 11/15/23 -  Thad Madden OT Occupational Therapist OT                  OT Recommendation and Plan  Planned Therapy Interventions (OT): activity tolerance training, BADL retraining, IADL retraining, occupation/activity based interventions, patient/caregiver education/training, ROM/therapeutic exercise, strengthening exercise, transfer/mobility retraining  Therapy Frequency (OT): 5 times/wk  Plan of Care Review  Plan of Care Reviewed With: patient  Outcome Evaluation: Pt is a 73 y/o male admitted to [facility] on 11/15/24 with c/o cellulitis. CT (-). Duplex (-) for DVT. Psych consulted as pt exhibits confusion overnight and grieving wife who recently passed. PMHx significant for anxiety and HTN. At baseline, pt resides alone in a H with x4 ENDY. Pt reports he is IND in I/ADLs and actively driving. He uses quad cane within home and reports he  is able to push a grocery cart at the store. Time of evaluation, pt A&O x4 with 3/10 pain in BLEs. Pt completes bed mobility with min/mod assist and comes to standing with mod assist, noted with posterior lean requiring assist and verbal cueing to correct. Pt transfers to chair with min assist. Attempts to utilize quad cane, however difficulty sequencing task. SBT administered, indicated impaired cognition. Pt appears to have difficulty with short term memory recall this date. Pt noted with deficits in cognitive processing with diminished safety awareness and increased confusion adversely impacting independence and safety with ADLs and warranting the need for skilled OT intervention in order to progress improved ADL performance. OT recommending SNF at this time.     Time Calculation:         Time Calculation- OT       Row Name 11/17/24 1610             Time Calculation- OT    OT Start Time 1520  -SP      OT Stop Time 1554  -SP      OT Time Calculation (min) 34 min  -SP      OT Received On 11/17/24  -SP      OT - Next Appointment 11/18/24  -SP      OT Goal Re-Cert Due Date 12/01/24  -SP                User Key  (r) = Recorded By, (t) = Taken By, (c) = Cosigned By      Initials Name Provider Type    Thad Galdamez OT Occupational Therapist                  Therapy Charges for Today       Code Description Service Date Service Provider Modifiers Qty    [id number] HC OT EVAL MOD COMPLEXITY 4 11/17/2024 Thad Madden OT GO 1                 Thad Madden OT  11/17/2024

## 2024-11-18 PROBLEM — L97.311: Status: ACTIVE | Noted: 2024-11-18

## 2024-11-18 PROBLEM — L97.921 NON-PRESSURE CHRONIC ULCER LEFT LOWER LEG, LIMITED TO BREAKDOWN SKIN: Status: ACTIVE | Noted: 2024-11-18

## 2024-11-18 LAB
ANION GAP SERPL CALCULATED.3IONS-SCNC: 9.2 MMOL/L (ref 5–15)
BACTERIA SPEC AEROBE CULT: ABNORMAL
BASOPHILS # BLD AUTO: 0.03 10*3/MM3 (ref 0–0.2)
BASOPHILS NFR BLD AUTO: 0.3 % (ref 0–1.5)
BUN SERPL-MCNC: 15 MG/DL (ref 8–23)
BUN/CREAT SERPL: 16.5 (ref 7–25)
CALCIUM SPEC-SCNC: 8.4 MG/DL (ref 8.6–10.5)
CHLORIDE SERPL-SCNC: 104 MMOL/L (ref 98–107)
CO2 SERPL-SCNC: 24.8 MMOL/L (ref 22–29)
CREAT SERPL-MCNC: 0.91 MG/DL (ref 0.76–1.27)
DEPRECATED RDW RBC AUTO: 43.3 FL (ref 37–54)
EGFRCR SERPLBLD CKD-EPI 2021: 87.3 ML/MIN/1.73
EOSINOPHIL # BLD AUTO: 0.25 10*3/MM3 (ref 0–0.4)
EOSINOPHIL NFR BLD AUTO: 2.9 % (ref 0.3–6.2)
ERYTHROCYTE [DISTWIDTH] IN BLOOD BY AUTOMATED COUNT: 12.5 % (ref 12.3–15.4)
GLUCOSE SERPL-MCNC: 87 MG/DL (ref 65–99)
GRAM STN SPEC: ABNORMAL
GRAM STN SPEC: ABNORMAL
HCT VFR BLD AUTO: 37.1 % (ref 37.5–51)
HGB BLD-MCNC: 12.2 G/DL (ref 13–17.7)
IMM GRANULOCYTES # BLD AUTO: 0.06 10*3/MM3 (ref 0–0.05)
IMM GRANULOCYTES NFR BLD AUTO: 0.7 % (ref 0–0.5)
LYMPHOCYTES # BLD AUTO: 0.88 10*3/MM3 (ref 0.7–3.1)
LYMPHOCYTES NFR BLD AUTO: 10.2 % (ref 19.6–45.3)
MAGNESIUM SERPL-MCNC: 2.3 MG/DL (ref 1.6–2.4)
MCH RBC QN AUTO: 31 PG (ref 26.6–33)
MCHC RBC AUTO-ENTMCNC: 32.9 G/DL (ref 31.5–35.7)
MCV RBC AUTO: 94.2 FL (ref 79–97)
MONOCYTES # BLD AUTO: 0.66 10*3/MM3 (ref 0.1–0.9)
MONOCYTES NFR BLD AUTO: 7.6 % (ref 5–12)
NEUTROPHILS NFR BLD AUTO: 6.75 10*3/MM3 (ref 1.7–7)
NEUTROPHILS NFR BLD AUTO: 78.3 % (ref 42.7–76)
NRBC BLD AUTO-RTO: 0 /100 WBC (ref 0–0.2)
PHOSPHATE SERPL-MCNC: 3.1 MG/DL (ref 2.5–4.5)
PLATELET # BLD AUTO: 307 10*3/MM3 (ref 140–450)
PMV BLD AUTO: 8.8 FL (ref 6–12)
POTASSIUM SERPL-SCNC: 3.8 MMOL/L (ref 3.5–5.2)
QT INTERVAL: 353 MS
QTC INTERVAL: 429 MS
RBC # BLD AUTO: 3.94 10*6/MM3 (ref 4.14–5.8)
SODIUM SERPL-SCNC: 138 MMOL/L (ref 136–145)
WBC NRBC COR # BLD AUTO: 8.63 10*3/MM3 (ref 3.4–10.8)

## 2024-11-18 PROCEDURE — 99222 1ST HOSP IP/OBS MODERATE 55: CPT | Performed by: INTERNAL MEDICINE

## 2024-11-18 PROCEDURE — 25010000002 HEPARIN (PORCINE) PER 1000 UNITS: Performed by: INTERNAL MEDICINE

## 2024-11-18 PROCEDURE — 84100 ASSAY OF PHOSPHORUS: CPT | Performed by: INTERNAL MEDICINE

## 2024-11-18 PROCEDURE — 93005 ELECTROCARDIOGRAM TRACING: CPT

## 2024-11-18 PROCEDURE — 97110 THERAPEUTIC EXERCISES: CPT

## 2024-11-18 PROCEDURE — 85025 COMPLETE CBC W/AUTO DIFF WBC: CPT

## 2024-11-18 PROCEDURE — 80048 BASIC METABOLIC PNL TOTAL CA: CPT

## 2024-11-18 PROCEDURE — 97116 GAIT TRAINING THERAPY: CPT

## 2024-11-18 PROCEDURE — 97530 THERAPEUTIC ACTIVITIES: CPT

## 2024-11-18 PROCEDURE — 83735 ASSAY OF MAGNESIUM: CPT | Performed by: INTERNAL MEDICINE

## 2024-11-18 PROCEDURE — 25010000002 CEFEPIME PER 500 MG

## 2024-11-18 PROCEDURE — 25010000002 FUROSEMIDE PER 20 MG

## 2024-11-18 RX ORDER — CARVEDILOL 6.25 MG/1
6.25 TABLET ORAL 2 TIMES DAILY WITH MEALS
Status: DISCONTINUED | OUTPATIENT
Start: 2024-11-18 | End: 2024-11-23 | Stop reason: HOSPADM

## 2024-11-18 RX ORDER — FUROSEMIDE 10 MG/ML
40 INJECTION INTRAMUSCULAR; INTRAVENOUS EVERY 12 HOURS
Status: DISCONTINUED | OUTPATIENT
Start: 2024-11-18 | End: 2024-11-20

## 2024-11-18 RX ORDER — HYDROCHLOROTHIAZIDE 25 MG/1
25 TABLET ORAL DAILY
Status: DISCONTINUED | OUTPATIENT
Start: 2024-11-18 | End: 2024-11-20

## 2024-11-18 RX ADMIN — HEPARIN SODIUM 5000 UNITS: 5000 INJECTION INTRAVENOUS; SUBCUTANEOUS at 21:10

## 2024-11-18 RX ADMIN — QUETIAPINE FUMARATE 25 MG: 25 TABLET ORAL at 21:10

## 2024-11-18 RX ADMIN — FUROSEMIDE 40 MG: 10 INJECTION, SOLUTION INTRAMUSCULAR; INTRAVENOUS at 21:10

## 2024-11-18 RX ADMIN — FUROSEMIDE 40 MG: 10 INJECTION, SOLUTION INTRAMUSCULAR; INTRAVENOUS at 08:54

## 2024-11-18 RX ADMIN — EMPAGLIFLOZIN 10 MG: 10 TABLET, FILM COATED ORAL at 17:25

## 2024-11-18 RX ADMIN — CEFEPIME 2000 MG: 2 INJECTION, POWDER, FOR SOLUTION INTRAVENOUS at 21:10

## 2024-11-18 RX ADMIN — HEPARIN SODIUM 5000 UNITS: 5000 INJECTION INTRAVENOUS; SUBCUTANEOUS at 08:55

## 2024-11-18 RX ADMIN — Medication 10 ML: at 08:58

## 2024-11-18 RX ADMIN — CEFEPIME 2000 MG: 2 INJECTION, POWDER, FOR SOLUTION INTRAVENOUS at 13:22

## 2024-11-18 RX ADMIN — LOSARTAN POTASSIUM 50 MG: 50 TABLET, FILM COATED ORAL at 08:55

## 2024-11-18 RX ADMIN — CARVEDILOL 6.25 MG: 6.25 TABLET, FILM COATED ORAL at 17:25

## 2024-11-18 RX ADMIN — HYDROCHLOROTHIAZIDE 25 MG: 25 TABLET ORAL at 13:24

## 2024-11-18 RX ADMIN — Medication 10 ML: at 21:11

## 2024-11-18 NOTE — TREATMENT PLAN
Objective:     Precautions - Falls    Bed mobility - Supervision  Transfers - Min-A, Mod-A, and with rolling walker STS x 4 from EOB including changing brief in standing. including MIP at RW w/ CGA.   Ambulation - 4 feet side steps towards HOB w/  CGA and with rolling walker    Therapeutic Exercise - 10 Reps Bilaterally AROM unsupported sitting / EOB      Assessment: Roger Nguyen presents with functional mobility impairments which indicate the need for skilled intervention. On arrival Pt brief, gown, and bed soiled and Pt is unaware. Pt w/ poor carryover of safety and technique education. Continue to recommend rehab as Pt is unable to care for himself and is below baseline. Tolerating session today without incident. Will continue to follow and progress as tolerated.

## 2024-11-18 NOTE — PLAN OF CARE
Goal Outcome Evaluation:   Pt rested well during the night.  Pt denies any pain or discomfort and no s/s of pain noted.  Will continue current plan of care.

## 2024-11-18 NOTE — DISCHARGE PLACEMENT REQUEST
"Roger Summers (74 y.o. Male)       Date of Birth   1950    Social Security Number       Address   [address] [place] [address]    Home Phone   [phone]    MRN   [id number]       Gnosticism   None    Marital Status                               Admission Date   11/15/24    Admission Type   Emergency    Admitting Provider   Judd Andrew MD    Attending Provider   Latia Amanda MD    Department, Room/Bed   [facility] MEDICAL INPATIENT, 375/1       Discharge Date       Discharge Disposition       Discharge Destination                                 Attending Provider: Latia Amanda MD    Allergies: No Known Allergies    Isolation: None   Infection: None   Code Status: Not on file    Ht: 175.3 cm (69\")   Wt: 93.2 kg (205 lb 7.5 oz)    Admission Cmt: None   Principal Problem: Cellulitis [L03.90]                   Active Insurance as of 11/15/2024       Primary Coverage       Payor Plan Insurance Group Employer/Plan Group    ANTHEM MEDICARE REPLACEMENT ANTHEM MEDICARE ADVANTAGE [id number]       Payor Plan Address Payor Plan Phone Number Payor Plan Fax Number Effective Dates    [address] [phone]  1/1/2024 - None Entered    [facility] [address]         Subscriber Name Subscriber Birth Date Member ID       ROGER SUMMERS 1950 [id number]                     Emergency Contacts        (Rel.) Home Phone Work Phone Mobile Phone    DANNY OROZCO (Son) -- -- [phone]                 Physical Therapy Notes (last 24 hours)        Reyes, Carmela, PT at 11/17/24 1655  Version 1 of 1         Goal Outcome Evaluation:  Plan of Care Reviewed With: patient           Outcome Evaluation: 73 y/o male admitted on 11/15 due o BLE swelling, redness. (-) DVT. Patient had episode of hematuria while in hospital as well as confusion. Patient's spouse has recently passed away. PMH includes htn, anxiety. Patient lives alone in [facility] with 4STE. Patient " normally managed own household activities including grocery shopping and reports using quad cane for mobility in home. At time of eval, patient is alert , oriented x 4 however noted with short term memory deficits. Patient was unable to recall where to ambulated when instructed prior to standing. Patient needed min/mod A of 1 for supine to sit with verbal and tactile cues . Mod A to come to standing due to posterior leaning with immediate stance and patient unable to use quad cane correctly. Ambulated 16 ft with min/mod A of 1-2 ; patient very unsteady with quad cane alone, improved with use of bed rail on other side however patient just carried his quad cane with other hand. Improved gait with HHA of 2; will trial with rw on next session. Patient is not safe to return home alone at this time. Recommend SNF for continued balance and mobility training.                               Electronically signed by Reyes, Carmela, PT at 24       Reyes, Carmela, PT at 24  Version 1 of 1         Patient Name: Roger Nguyen  : 1950    MRN: [id number]                              Today's Date: 2024       Admit Date: 11/15/2024    Visit Dx:     ICD-10-CM ICD-9-CM   1. Bilateral lower leg cellulitis  L03.116 682.6    L03.115      Patient Active Problem List   Diagnosis    Cellulitis     History reviewed. No pertinent past medical history.  History reviewed. No pertinent surgical history.   General Information       Row Name 24 1642          Physical Therapy Time and Intention    Document Type evaluation  -CR     Mode of Treatment physical therapy  -CR       Row Name 24 4105          General Information    Patient Profile Reviewed yes  -CR     Prior Level of Function independent:;all household mobility;community mobility  using quad cane that belonged to late wife  -CR     Existing Precautions/Restrictions fall  -CR     Barriers to Rehab cognitive status;medically complex  -CR        Row Name 11/17/24 1642          Living Environment    People in Home alone  -CR       Row Name 11/17/24 1642          Home Main Entrance    Number of Stairs, Main Entrance four  -CR     Stair Railings, Main Entrance railings safe and in good condition  -CR       Row Name 11/17/24 1642          Cognition    Orientation Status (Cognition) oriented x 4  -CR       Row Name 11/17/24 1642          Safety Issues/Impairments Affecting Functional Mobility    Safety Issues Affecting Function (Mobility) at risk behavior observed;awareness of need for assistance;insight into deficits/self-awareness;positioning of assistive device;sequencing abilities  -CR     Impairments Affecting Function (Mobility) balance;cognition;endurance/activity tolerance;strength  -CR               User Key  (r) = Recorded By, (t) = Taken By, (c) = Cosigned By      Initials Name Provider Type    CR Reyes, Carmela, PT Physical Therapist                   Mobility       Row Name 11/17/24 1643          Bed Mobility    Bed Mobility supine-sit  -CR     Supine-Sit [place] (Bed Mobility) minimum assist (75% patient effort);moderate assist (50% patient effort);verbal cues  -CR     Assistive Device (Bed Mobility) head of bed elevated;bed rails  -CR       Row Name 11/17/24 1643          Bed-Chair Transfer    Bed-Chair [place] (Transfers) contact guard;verbal cues  -CR     Assistive Device (Bed-Chair Transfers) walker, front-wheeled  -CR       Row Name 11/17/24 1643          Sit-Stand Transfer    Sit-Stand [place] (Transfers) moderate assist (50% patient effort)  -CR     Assistive Device (Sit-Stand Transfers) cane, quad  -CR     Comment, (Sit-Stand Transfer) posterior leaning upon immediate standing  -CR       Row Name 11/17/24 1643          Gait/Stairs (Locomotion)    [place] Level (Gait) minimum assist (75% patient effort);2 person assist  -CR     Assistive Device (Gait) cane, quad  HHA  -CR     Distance in Feet (Gait) 16  -CR      Deviations/Abnormal Patterns (Gait) gait speed decreased;ataxic  -CR     Bilateral Gait Deviations forward flexed posture  -CR               User Key  (r) = Recorded By, (t) = Taken By, (c) = Cosigned By      Initials Name Provider Type    CR Reyes, Carmela, PT Physical Therapist                   Obj/Interventions       Row Name 11/17/24 1644          Range of Motion Comprehensive    Comment, General Range of Motion lacking terminal knee extension both  -CR       Row Name 11/17/24 1644          Strength Comprehensive (MMT)    Comment, General Manual Muscle Testing (MMT) Assessment BLEgrossly 3+/5  -CR       Row Name 11/17/24 1644          Balance    Balance Assessment standing static balance;standing dynamic balance  -CR     Static Standing Balance minimal assist;2-person assist  -CR     Dynamic Standing Balance minimal assist;moderate assist;2-person assist  -CR     Position/Device Used, Standing Balance supported  -CR               User Key  (r) = Recorded By, (t) = Taken By, (c) = Cosigned By      Initials Name Provider Type    CR Reyes, Carmela, PT Physical Therapist                   Goals/Plan       Row Name 11/17/24 1652          Transfer Goal 1 (PT)    Activity/Assistive Device (Transfer Goal 1, PT) transfers, all;walker, rolling  -CR     [place] Level/Cues Needed (Transfer Goal 1, PT) contact guard required  -CR     Time Frame (Transfer Goal 1, PT) long term goal (LTG);2 weeks  -CR       Row Name 11/17/24 1652          Gait Training Goal 1 (PT)    Activity/Assistive Device (Gait Training Goal 1, PT) gait (walking locomotion);assistive device use;improve balance and speed;decrease fall risk;walker, rolling  -CR     [place] Level (Gait Training Goal 1, PT) contact guard required  -CR     Distance (Gait Training Goal 1, PT) 40  -CR     Time Frame (Gait Training Goal 1, PT) long term goal (LTG);2 weeks  -CR       Row Name 11/17/24 1652          Therapy Assessment/Plan (PT)    Planned Therapy  Interventions (PT) balance training;bed mobility training;gait training;home exercise program;patient/family education;stair training;transfer training;neuromuscular re-education;strengthening  -CR               User Key  (r) = Recorded By, (t) = Taken By, (c) = Cosigned By      Initials Name Provider Type    CR Reyes, Carmela, PT Physical Therapist                   Clinical Impression       Row Name 11/17/24 2714          Pain    Pretreatment Pain Rating 3/10  -CR     Posttreatment Pain Rating 3/10  -CR     Pain Location hip;knee  -CR     Pain Side/Orientation bilateral  -CR       Row Name 11/17/24 3126          Plan of Care Review    Plan of Care Reviewed With patient  -CR     Outcome Evaluation 75 y/o male admitted on 11/15 due o BLE swelling, redness. (-) DVT. Patient had episode of hematuria while in hospital as well as confusion. Patient's spouse has recently passed away. PMH includes htn, anxiety. Patient lives alone in [facility] with 4STE. Patient normally managed own household activities including grocery shopping and reports using quad cane for mobility in home. At time of eval, patient is alert , oriented x 4 however noted with short term memory deficits. Patient was unable to recall where to ambulated when instructed prior to standing. Patient needed min/mod A of 1 for supine to sit with verbal and tactile cues . Mod A to come to standing due to posterior leaning with immediate stance and patient unable to use quad cane correctly. Ambulated 16 ft with min/mod A of 1-2 ; patient very unsteady with quad cane alone, improved with use of bed rail on other side however patient just carried his quad cane with other hand. Improved gait with HHA of 2; will trial with rw on next session. Patient is not safe to return home alone at this time. Recommend SNF for continued balance and mobility training.  -CR       Row Name 11/17/24 1655 11/17/24 5622       Therapy Assessment/Plan (PT)    Patient/Family Therapy Goals  Statement (PT) -- get better  -CR    Rehab Potential (PT) good  -CR good  -CR    Criteria for Skilled Interventions Met (PT) yes;skilled treatment is necessary  -CR yes;skilled treatment is necessary  -CR    Therapy Frequency (PT) 5 times/wk  -CR 5 times/wk  -CR    Predicted Duration of Therapy Intervention (PT) -- dc  -CR      Row Name 11/17/24 1645          Positioning and Restraints    Post Treatment Position chair  -CR     In Chair notified nsg;sitting;reclined;call light within reach  -CR               User Key  (r) = Recorded By, (t) = Taken By, (c) = Cosigned By      Initials Name Provider Type    CR Reyes, Carmela, PT Physical Therapist                   Outcome Measures       Row Name 11/17/24 1654          How much help from another person do you currently need...    Turning from your back to your side while in flat bed without using bedrails? 3  -CR     Moving from lying on back to sitting on the side of a flat bed without bedrails? 2  -CR     Moving to and from a bed to a chair (including a wheelchair)? 3  -CR     Standing up from a chair using your arms (e.g., wheelchair, bedside chair)? 2  -CR     Climbing 3-5 steps with a railing? 2  -CR     To walk in hospital room? 2  -CR     AM-PAC 6 Clicks Score (PT) 14  -CR       Row Name 11/17/24 1609          Functional Assessment    Outcome Measure Options AM-PAC 6 Clicks Daily Activity (OT)  -SP               User Key  (r) = Recorded By, (t) = Taken By, (c) = Cosigned By      Initials Name Provider Type    CR Reyes, Carmela, PT Physical Therapist    Thad Galdamez, OT Occupational Therapist                                 Physical Therapy Education       Title: PT OT SLP Therapies (In Progress)       Topic: Physical Therapy (In Progress)       Point: Mobility training (In Progress)       Learning Progress Summary            Patient Acceptance, E, NR by CR at 11/17/2024 1655                      Point: Home exercise program (Not Started)       Learner  Progress:  Not documented in this visit.              Point: Body mechanics (Not Started)       Learner Progress:  Not documented in this visit.              Point: Precautions (Not Started)       Learner Progress:  Not documented in this visit.                              User Key       Initials Effective Dates Name Provider Type Discipline    CR 06/16/21 -  Reyes, Carmela, PT Physical Therapist PT                  PT Recommendation and Plan  Planned Therapy Interventions (PT): balance training, bed mobility training, gait training, home exercise program, patient/family education, stair training, transfer training, neuromuscular re-education, strengthening  Outcome Evaluation: 75 y/o male admitted on 11/15 due o BLE swelling, redness. (-) DVT. Patient had episode of hematuria while in hospital as well as confusion. Patient's spouse has recently passed away. PMH includes htn, anxiety. Patient lives alone in [facility] with 4STE. Patient normally managed own household activities including grocery shopping and reports using quad cane for mobility in home. At time of eval, patient is alert , oriented x 4 however noted with short term memory deficits. Patient was unable to recall where to ambulated when instructed prior to standing. Patient needed min/mod A of 1 for supine to sit with verbal and tactile cues . Mod A to come to standing due to posterior leaning with immediate stance and patient unable to use quad cane correctly. Ambulated 16 ft with min/mod A of 1-2 ; patient very unsteady with quad cane alone, improved with use of bed rail on other side however patient just carried his quad cane with other hand. Improved gait with HHA of 2; will trial with rw on next session. Patient is not safe to return home alone at this time. Recommend SNF for continued balance and mobility training.     Time Calculation:         PT Charges       Row Name 11/17/24 1013             Time Calculation    Start Time 1520  -CR      Stop Time  1554  -CR      Time Calculation (min) 34 min  -CR      PT Received On 11/17/24  -CR      PT - Next Appointment 11/18/24  -CR      PT Goal Re-Cert Due Date 12/01/24  -CR         Time Calculation- PT    Total Timed Code Minutes- PT 0 minute(s)  -CR                User Key  (r) = Recorded By, (t) = Taken By, (c) = Cosigned By      Initials Name Provider Type    CR Reyes, Carmela, PT Physical Therapist                  Therapy Charges for Today       Code Description Service Date Service Provider Modifiers Qty    [id number] HC PT EVAL MOD COMPLEXITY 4 11/17/2024 Reyes, Carmela, PT GP 1            PT G-Codes  Outcome Measure Options: AM-PAC 6 Clicks Daily Activity (OT)  AM-PAC 6 Clicks Score (PT): 14  AM-PAC 6 Clicks Score (OT): 17  PT Discharge Summary  Anticipated Discharge Disposition (PT): skilled nursing facility    Carmela Reyes, PT  11/17/2024      Electronically signed by Reyes, Carmela, PT at 11/17/24 1657          Occupational Therapy Notes (last 24 hours)        Thad Madden, OT at 11/17/24 1611          Goal Outcome Evaluation:  Plan of Care Reviewed With: patient    Outcome Evaluation: Pt is a 73 y/o male [facility] on [facility]15[facility] with c/o cellulitis. CT (-). Duplex (-) for DVT. Psych consulted as pt exhibits confusion overnight and grieving wife who recently passed. PMHx significant for anxiety and HTN. At baseline, pt resides alone in a H with x4 ENDY. Pt reports he is IND in I/ADLs and actively driving. He uses quad cane within home and reports he is able to push a grocery cart at the store. Time of evaluation, pt A&O x4 with 3/10 pain in BLEs. Pt completes bed mobility with min/mod assist and comes to standing with mod assist, noted with posterior lean requiring assist and verbal cueing to correct. Pt transfers to chair with min assist. Attempts to utilize quad cane, however difficulty sequencing task. SBT administered, indicated impaired cognition. Pt appears to have difficulty with short term  memory recall this date. Pt noted with deficits in cognitive processing with diminished safety awareness and increased confusion adversely impacting independence and safety with ADLs and warranting the need for skilled OT intervention in order to progress improved ADL performance. OT recommending SNF at this time.    Anticipated Discharge Disposition (OT): skilled nursing facility    Electronically signed by Thad Madden, OT at 24 1611       Thad Madden, OT at 24 1611          Patient Name: Roger Nguyen  : 1950    MRN: [id number]                              Today's Date: 2024       Admit Date: 11/15/2024    Visit Dx:     ICD-10-CM ICD-9-CM   1. Bilateral lower leg cellulitis  L03.116 682.6    L03.115      Patient Active Problem List   Diagnosis    Cellulitis     History reviewed. No pertinent past medical history.  History reviewed. No pertinent surgical history.   General Information       Row Name 24 1550 24 1459       OT Time and Intention    Document Type evaluation  -SP --  -SP    Mode of Treatment occupational therapy  -SP --  -SP      Row Name 24 1550 24 1459       General Information    Patient Profile Reviewed yes  -SP yes  -SP    Prior Level of Function independent:;ADL's;driving;home management  -SP --  -SP    Barriers to Rehab medically complex  -SP --      Row Name 24 155          Living Environment    People in Home alone  -SP       Row Name 24 1550          Home Main Entrance    Number of Stairs, Main Entrance four  -SP       Row Name 24 1550          Stairs Within Home, Primary    Number of Stairs, Within Home, Primary none  -SP       Row Name 24 1550          Cognition    Orientation Status (Cognition) oriented x 4  -SP       Row Name 24 155          Safety Issues/Impairments Affecting Functional Mobility    Safety Issues Affecting Function (Mobility) awareness of need for assistance;insight into  deficits/self-awareness;judgment;positioning of assistive device;problem-solving;sequencing abilities  -SP     Impairments Affecting Function (Mobility) balance;cognition;endurance/activity tolerance;strength  -SP     Cognitive Impairments, Mobility Safety/Performance awareness, need for assistance;insight into deficits/self-awareness;judgment;problem-solving/reasoning;safety precaution awareness;sequencing abilities  -SP               User Key  (r) = Recorded By, (t) = Taken By, (c) = Cosigned By      Initials Name Provider Type    SP Thad Madden OT Occupational Therapist                     Mobility/ADL's       Row Name 11/17/24 1602          Bed Mobility    Bed Mobility bed mobility (all) activities  -SP     All Activities, [place] (Bed Mobility) minimum assist (75% patient effort);moderate assist (50% patient effort)  -SP       Row Name 11/17/24 1602          Transfers    Transfers sit-stand transfer;bed-chair transfer  -SP       Row Name 11/17/24 1602          Sit-Stand Transfer    Sit-Stand [place] (Transfers) moderate assist (50% patient effort)  -SP     Comment, (Sit-Stand Transfer) Pt noted with posterior lean requires assist and verbal cueing to correct, verbaal cues for head/neck extension  -SP       Row Name 11/17/24 1602          Functional Mobility    Functional Mobility- Ind. Level minimum assist (75% patient effort);2 person assist required  -SP     Patient was able to Ambulate yes  -SP       Row Name 11/17/24 1602          Activities of Daily Living    BADL Assessment/Intervention lower body dressing  -SP       Row Name 11/17/24 1602          Lower Body Dressing Assessment/Training    [place] Level (Lower Body Dressing) don;shoes/slippers;moderate assist (50% patient effort)  -SP               User Key  (r) = Recorded By, (t) = Taken By, (c) = Cosigned By      Initials Name Provider Type    SP Thad Madden OT Occupational Therapist                   Obj/Interventions        Row Name 11/17/24 1604          Sensory Assessment (Somatosensory)    Sensory Assessment (Somatosensory) UE sensation intact  -SP       [facility] Name 11/17/24 1604          Range of Motion Comprehensive    General Range of Motion bilateral upper extremity ROM WFL  -SP       [facility] Name 11/17/24 1604          Strength Comprehensive (MMT)    General Manual Muscle Testing (MMT) Assessment no strength deficits identified  -SP     Comment, General Manual Muscle Testing (MMT) Assessment BUE grossly WFLs  -SP       Row Name 11/17/24 1604          Balance    Balance Assessment sitting static balance;sit to stand dynamic balance;standing static balance  -SP     Static Sitting Balance standby assist  -SP     Position, Sitting Balance unsupported;sitting edge of bed  -SP     Sit to Stand Dynamic Balance moderate assist  -SP     Static Standing Balance minimal assist;2-person assist  -SP               User Key  (r) = Recorded By, (t) = Taken By, (c) = Cosigned By      Initials Name Provider Type    SP Thad Madden, OT Occupational Therapist                   Goals/Plan       [facility] Name 11/17/24 1606          Bathing Goal 1 (OT)    Activity/Device (Bathing Goal 1, OT) bathing skills, all  -SP     [place] Level/Cues Needed (Bathing Goal 1, OT) minimum assist (75% or more patient effort)  -SP     Time Frame (Bathing Goal 1, OT) 2 weeks  -SP     Strategies/Barriers (Bathing Goal 1, OT) until d/c  -SP       [facility] Name 11/17/24 1606          Dressing Goal 1 (OT)    Activity/Device (Dressing Goal 1, OT) dressing skills, all  -SP     [place]/Cues Needed (Dressing Goal 1, OT) minimum assist (75% or more patient effort)  -SP     Time Frame (Dressing Goal 1, OT) 2 weeks  -SP     Strategies/Barriers (Dressing Goal 1, OT) until d/c  -SP       [facility] Name 11/17/24 1606          Toileting Goal 1 (OT)    Activity/Device (Toileting Goal 1, OT) toileting skills, all  -SP     [place] Level/Cues Needed (Toileting Goal 1, OT) minimum assist (75%  or more patient effort)  -SP     Time Frame (Toileting Goal 1, OT) 2 weeks  -SP     Strategies/Barriers (Toileting Goal 1, OT) until d/c  -SP       Row Name 11/17/24 1606          Therapy Assessment/Plan (OT)    Planned Therapy Interventions (OT) activity tolerance training;BADL retraining;IADL retraining;occupation/activity based interventions;patient/caregiver education/training;ROM/therapeutic exercise;strengthening exercise;transfer/mobility retraining  -SP               User Key  (r) = Recorded By, (t) = Taken By, (c) = Cosigned By      Initials Name Provider Type    SP Thad Madden OT Occupational Therapist                   Clinical Impression       Row Name 11/17/24 1608          Pain Assessment    Pretreatment Pain Rating 3/10  -SP     Posttreatment Pain Rating 3/10  -SP     Pain Location extremity  -SP     Pain Side/Orientation bilateral;lower  -SP       Row Name 11/17/24 1606          Plan of Care Review    Plan of Care Reviewed With patient  -SP     Outcome Evaluation Pt is a 75 y/o male admitted to [facility] on 11/15/24 with c/o cellulitis. CT (-). Duplex (-) for DVT. Psych consulted as pt exhibits confusion overnight and grieving wife who recently passed. PMHx significant for anxiety and HTN. At baseline, pt resides alone in a H with x4 ENDY. Pt reports he is IND in I/ADLs and actively driving. He uses quad cane within home and reports he is able to push a grocery cart at the store. Time of evaluation, pt A&O x4 with 3/10 pain in BLEs. Pt completes bed mobility with min/mod assist and comes to standing with mod assist, noted with posterior lean requiring assist and verbal cueing to correct. Pt transfers to chair with min assist. Attempts to utilize quad cane, however difficulty sequencing task. SBT administered, indicated impaired cognition. Pt appears to have difficulty with short term memory recall this date. Pt noted with deficits in cognitive processing with diminished safety awareness and increased  confusion adversely impacting independence and safety with ADLs and warranting the need for skilled OT intervention in order to progress improved ADL performance. OT recommending SNF at this time.  -SP       Row Name 11/17/24 1604          Therapy Assessment/Plan (OT)    Criteria for Skilled Therapeutic Interventions Met (OT) yes;meets criteria;skilled treatment is necessary  -SP     Therapy Frequency (OT) 5 times/wk  -SP     Predicted Duration of Therapy Intervention (OT) until d/c  -SP       Row Name 11/17/24 1604          Therapy Plan Review/Discharge Plan (OT)    Anticipated Discharge Disposition (OT) skilled nursing facility  -SP       Row Name 11/17/24 1604          Vital Signs    Pre Systolic BP Rehab 144  -SP     Pre Treatment Diastolic BP 78  -SP     O2 Delivery Pre Treatment room air  -SP     O2 Delivery Intra Treatment room air  -SP     O2 Delivery Post Treatment room air  -SP     Pre Patient Position Supine  -SP     Intra Patient Position Standing  -SP     Post Patient Position Sitting  -SP       Row Name 11/17/24 1604          Positioning and Restraints    Pre-Treatment Position in bed  -SP     Post Treatment Position chair  -SP     In Chair notified nsg;reclined;call light within reach;encouraged to call for assist;exit alarm on  -SP               User Key  (r) = Recorded By, (t) = Taken By, (c) = Cosigned By      Initials Name Provider Type    SP Thad Madden, OT Occupational Therapist                   Outcome Measures       Row Name 11/17/24 1600          How much help from another is currently needed...    Putting on and taking off regular lower body clothing? 2  -SP     Bathing (including washing, rinsing, and drying) 3  -SP     Toileting (which includes using toilet bed pan or urinal) 3  -SP     Putting on and taking off regular upper body clothing 3  -SP     Taking care of personal grooming (such as brushing teeth) 3  -SP     Eating meals 3  -SP     AM-PAC 6 Clicks Score (OT) 17  -SP        Row Name 11/17/24 1609          Functional Assessment    Outcome Measure Options AM-PAC 6 Clicks Daily Activity (OT)  -SP               User Key  (r) = Recorded By, (t) = Taken By, (c) = Cosigned By      Initials Name Provider Type    SP Thad Madden OT Occupational Therapist                    Occupational Therapy Education       Title: PT OT SLP Therapies (In Progress)       Topic: Occupational Therapy (In Progress)       Point: ADL training (Done)       Description:   Instruct learner(s) on proper safety adaptation and remediation techniques during self care or transfers.   Instruct in proper use of assistive devices.                  Learning Progress Summary            Patient Acceptance, E,TB, VU by SP at 11/17/2024 1610                      Point: Home exercise program (Not Started)       Description:   Instruct learner(s) on appropriate technique for monitoring, assisting and/or progressing therapeutic exercises/activities.                  Learner Progress:  Not documented in this visit.              Point: Precautions (Not Started)       Description:   Instruct learner(s) on prescribed precautions during self-care and functional transfers.                  Learner Progress:  Not documented in this visit.              Point: Body mechanics (Not Started)       Description:   Instruct learner(s) on proper positioning and spine alignment during self-care, functional mobility activities and/or exercises.                  Learner Progress:  Not documented in this visit.                              User Key       Initials Effective Dates Name Provider Type Discipline    SP 11/15/23 -  Thad Madden OT Occupational Therapist OT                  OT Recommendation and Plan  Planned Therapy Interventions (OT): activity tolerance training, BADL retraining, IADL retraining, occupation/activity based interventions, patient/caregiver education/training, ROM/therapeutic exercise, strengthening exercise,  transfer/mobility retraining  Therapy Frequency (OT): 5 times/wk  Plan of Care Review  Plan of Care Reviewed With: patient  Outcome Evaluation: Pt is a 73 y/o male admitted to [facility] on 11/15/24 with c/o cellulitis. CT (-). Duplex (-) for DVT. Psych consulted as pt exhibits confusion overnight and grieving wife who recently passed. PMHx significant for anxiety and HTN. At baseline, pt resides alone in a H with x4 ENDY. Pt reports he is IND in I/ADLs and actively driving. He uses quad cane within home and reports he is able to push a grocery cart at the store. Time of evaluation, pt A&O x4 with 3/10 pain in BLEs. Pt completes bed mobility with min/mod assist and comes to standing with mod assist, noted with posterior lean requiring assist and verbal cueing to correct. Pt transfers to chair with min assist. Attempts to utilize quad cane, however difficulty sequencing task. SBT administered, indicated impaired cognition. Pt appears to have difficulty with short term memory recall this date. Pt noted with deficits in cognitive processing with diminished safety awareness and increased confusion adversely impacting independence and safety with ADLs and warranting the need for skilled OT intervention in order to progress improved ADL performance. OT recommending SNF at this time.     Time Calculation:         Time Calculation- OT       Row Name 11/17/24 1610             Time Calculation- OT    OT Start Time 1520  -SP      OT Stop Time 1554  -SP      OT Time Calculation (min) 34 min  -SP      OT Received On 11/17/24  -SP      OT - Next Appointment 11/18/24  -SP      OT Goal Re-Cert Due Date 12/01/24  -SP                User Key  (r) = Recorded By, (t) = Taken By, (c) = Cosigned By      Initials Name Provider Type    Thad Galdamez OT Occupational Therapist                  Therapy Charges for Today       Code Description Service Date Service Provider Modifiers Qty    [id number]  OT EVAL MOD COMPLEXITY 4 11/17/2024  Thad Madden, OT GO 1                 Thad Madden, GUDELIA  11/17/2024    Electronically signed by Thad Madden, OT at 11/17/24 6509

## 2024-11-18 NOTE — CONSULTS
Referring Provider: Latia Amanda,*    Reason for Consultation: CHF, elevated proBNP      Patient Care Team:  Provider, No Known as PCP - General      SUBJECTIVE     Chief Complaint: Lower extremity cellulitis    History of present illness:  Roger Nguyen is a 74 y.o. male with hypertension, obesity, anxiety who initially presented to the hospital with complaints of bilateral lower extremity swelling and pain.  Patient has been diagnosed with cellulitis and is being treated with antibiotics.  He has been noted to have elevated proBNP.  Echocardiogram shows preserved LV function with grade 1 diastolic dysfunction.  He has been started on antibiotics for cellulitis and IV diuresis for HFpEF.  Cardiology has been consulted for further evaluation and management.        Review of systems:    Constitutional: No weakness, fatigue, fever, rigors, chills   Eyes: No vision changes, eye pain   ENT/oropharynx: No difficulty swallowing, sore throat, epistaxis, changes in hearing   Cardiovascular: No chest pain, chest tightness, palpitations, paroxysmal nocturnal dyspnea, orthopnea, diaphoresis, dizziness / syncopal episode   Respiratory: No shortness of breath, dyspnea on exertion, cough, wheezing, hemoptysis   Gastrointestinal: No abdominal pain, nausea, vomiting, diarrhea, bloody stools   Genitourinary: No hematuria, dysuria   Neurological: No headache, tremors, numbness, one-sided weakness    Musculoskeletal: No cramps, myalgias, joint pain, joint swelling   Integument: No rash, + edema        Personal History:      History reviewed. No pertinent past medical history.    History reviewed. No pertinent surgical history.    History reviewed. No pertinent family history.    Social History     Tobacco Use    Smoking status: Never    Smokeless tobacco: Never   Vaping Use    Vaping status: Never Used   Substance Use Topics    Alcohol use: Never    Drug use: Never        Home meds:  Prior to Admission medications     Medication Sig Start Date End Date Taking? Authorizing Provider   VITAMIN D, CHOLECALCIFEROL, PO Take 1 capsule by mouth Daily.   Yes Provider, MD Rosemary   buPROPion XL (WELLBUTRIN XL) 150 MG 24 hr tablet TAKE ONE TABLET BY MOUTH DAILY  Patient not taking: Reported on 11/15/2024 6/22/22   Juan Harding MD   lisinopril-hydrochlorothiazide (PRINZIDE,ZESTORETIC) 20-12.5 MG per tablet TAKE ONE TABLET BY MOUTH DAILY  Patient not taking: Reported on 11/15/2024 7/27/22   Juan Harding MD   vitamin D (ERGOCALCIFEROL) 1.25 MG (79859 UT) capsule capsule TAKE ONE CAPSULE BY MOUTH ONCE WEEKLY  Patient not taking: Reported on 11/15/2024 2/20/20   Valentina Andrade MD       Allergies:     Patient has no known allergies.    Scheduled Meds:cefepime, 2,000 mg, Intravenous, Q8H  cefepime, 2,000 mg, Intravenous, Q8H  furosemide, 40 mg, Intravenous, Q12H  heparin (porcine), 5,000 Units, Subcutaneous, Q12H  hydroCHLOROthiazide, 25 mg, Oral, Daily  iopamidol, 100 mL, Intravenous, Once in imaging  losartan, 50 mg, Oral, Q24H  QUEtiapine, 25 mg, Oral, Nightly  sodium chloride, 10 mL, Intravenous, Q12H      Continuous Infusions:Pharmacy to Dose Cefepime,       PRN Meds:  acetaminophen **OR** acetaminophen **OR** acetaminophen    ALPRAZolam    aluminum-magnesium hydroxide-simethicone    senna-docusate sodium **AND** polyethylene glycol **AND** bisacodyl **AND** bisacodyl    Calcium Replacement - Follow Nurse / BPA Driven Protocol    hydrALAZINE    Magnesium Cardiology Dose Replacement - Follow Nurse / BPA Driven Protocol    melatonin    nitroglycerin    ondansetron ODT **OR** ondansetron    Pharmacy to Dose Cefepime    Phosphorus Replacement - Follow Nurse / BPA Driven Protocol    Potassium Replacement - Follow Nurse / BPA Driven Protocol    [COMPLETED] Insert Peripheral IV **AND** sodium chloride    sodium chloride    sodium chloride    traZODone      OBJECTIVE    Vital Signs  Vitals:    11/18/24 0445 11/18/24 0723  "11/18/24 0855 11/18/24 1111   BP: 173/82 172/80 170/82 156/79   BP Location: Right arm Right arm  Right arm   Patient Position: Lying Lying  Lying   Pulse: 89   78   Resp: 19 22 16   Temp: 97.9 °F (36.6 °C) 98.1 °F (36.7 °C)  98 °F (36.7 °C)   TempSrc: Oral Oral  Oral   SpO2: 93%   93%   Weight:       Height:           Flowsheet Rows      Flowsheet Row First Filed Value   Admission Height 172.7 cm (68\") Documented at 11/15/2024 1134   Admission Weight 93.8 kg (206 lb 12.7 oz) Documented at 11/15/2024 1133              Intake/Output Summary (Last 24 hours) at 11/18/2024 1331  Last data filed at 11/18/2024 0900  Gross per 24 hour   Intake 720 ml   Output --   Net 720 ml        Telemetry: Sinus rhythm    Physical Exam:  The patient is alert, oriented and in no distress.  Vital signs as noted above.  Head and neck revealed no carotid bruits or jugular venous distention.  No thyromegaly or lymphadenopathy is present  Lungs clear.  No wheezing.  Breath sounds are normal bilaterally.  Heart: Normal first and second heart sounds. No murmur.  No precordial rub is present.  No gallop is present.  Abdomen: Soft and nontender.  No organomegaly is present.  Extremities with good peripheral pulses without any pedal edema.  Skin: Warm and dry.  Musculoskeletal system is grossly normal.  CNS grossly normal.       Results Review:  I have personally reviewed the results from the time of this admission to 11/18/2024 13:31 EST and agree with these findings:  []  Laboratory  []  Microbiology  []  Radiology  []  EKG/Telemetry   []  Cardiology/Vascular   []  Pathology  []  Old records  []  Other:    Most notable findings include:     Lab Results (last 24 hours)       Procedure Component Value Units Date/Time    Blood Culture - Blood, Arm, Right [[id number]]  (Normal) Collected: 11/15/24 1255    Specimen: Blood from Arm, Right Updated: 11/18/24 1300     Blood Culture No growth at 3 days    Blood Culture - Blood, Arm, Left [[id number]]  " (Normal) Collected: 11/15/24 1232    Specimen: Blood from Arm, Left Updated: 11/18/24 1245     Blood Culture No growth at 3 days    Wound Culture - Wound, Leg [[id number]]  (Abnormal)  (Susceptibility) Collected: 11/16/24 0007    Specimen: Wound from Leg Updated: 11/18/24 0740     Wound Culture Scant growth (1+) Pseudomonas aeruginosa      Rare growth Enterobacter cloacae complex     Comment:   This organism may develop resistance during prolonged therapy with 3rd generation cephalosporins (e.g. ceftriaxone) as a result of de-repression of AmpC B-lactamase.  Ceftriaxone may be a reasonable treatment option for uncomplicated cystitis or other lower severity infections when susceptibility is demonstrated.         Rare growth Streptococcus agalactiae (Group B)     Comment:   This organism is considered to be universally susceptible to penicillin.  No further antibiotic testing will be performed. If Clindamycin or Erythromycin is the drug of choice, notify the laboratory within 7 days to request susceptibility testing.  Corrected result. Previously Reported Organism Changed. Previous result was Normal Skin Vida on 11/18/2024 at 0736 EST.         Scant growth (1+) Normal Skin Vida     Comment:   Appended report. These results have been appended to a previously final verified report.        Gram Stain Rare (1+) WBCs per low power field      No organisms seen    Susceptibility        Pseudomonas aeruginosa      CHEMA      Cefepime Susceptible      Ceftazidime Susceptible      Ciprofloxacin Susceptible      Levofloxacin Susceptible      Piperacillin + Tazobactam Susceptible      Tobramycin Susceptible                       Susceptibility        Enterobacter cloacae complex      CHEMA      Cefepime Susceptible      Ceftazidime Resistant      Gentamicin Susceptible      Levofloxacin Susceptible      Trimethoprim + Sulfamethoxazole Susceptible                       Susceptibility Comments       Pseudomonas aeruginosa    With the  exception of urinary-sourced infections, aminoglycosides should not be used as monotherapy.      Enterobacter cloacae complex    With the exception of urinary-sourced infections, aminoglycosides should not be used as monotherapy.               Phosphorus [[id number]]  (Normal) Collected: 11/18/24 0203    Specimen: Blood Updated: 11/18/24 0247     Phosphorus 3.1 mg/dL     Magnesium [[id number]]  (Normal) Collected: 11/18/24 0203    Specimen: Blood Updated: 11/18/24 0239     Magnesium 2.3 mg/dL     Basic Metabolic Panel [[id number]]  (Abnormal) Collected: 11/18/24 0203    Specimen: Blood Updated: 11/18/24 0239     Glucose 87 mg/dL      BUN 15 mg/dL      Creatinine 0.91 mg/dL      Sodium 138 mmol/L      Potassium 3.8 mmol/L      Chloride 104 mmol/L      CO2 24.8 mmol/L      Calcium 8.4 mg/dL      BUN/Creatinine Ratio 16.5     Anion Gap 9.2 mmol/L      eGFR 87.3 mL/min/1.73     Narrative:      GFR Normal >60  Chronic Kidney Disease <60  Kidney Failure <15    The GFR formula is only valid for adults with stable renal function between ages 18 and 70.    CBC & Differential [[id number]]  (Abnormal) Collected: 11/18/24 0203    Specimen: Blood Updated: 11/18/24 0210    Narrative:      The following orders were created for panel order CBC & Differential.  Procedure                               Abnormality         Status                     ---------                               -----------         ------                     CBC Auto Differential[[id number]]        Abnormal            Final result                 Please view results for these tests on the individual orders.    CBC Auto Differential [[id number]]  (Abnormal) Collected: 11/18/24 0203    Specimen: Blood Updated: 11/18/24 0210     WBC 8.63 10*3/mm3      RBC 3.94 10*6/mm3      Hemoglobin 12.2 g/dL      Hematocrit 37.1 %      MCV 94.2 fL      MCH 31.0 pg      MCHC 32.9 g/dL      RDW 12.5 %      RDW-SD 43.3 fl      MPV 8.8 fL      Platelets 307 10*3/mm3      Neutrophil %  78.3 %      Lymphocyte % 10.2 %      Monocyte % 7.6 %      Eosinophil % 2.9 %      Basophil % 0.3 %      Immature Grans % 0.7 %      Neutrophils, Absolute 6.75 10*3/mm3      Lymphocytes, Absolute 0.88 10*3/mm3      Monocytes, Absolute 0.66 10*3/mm3      Eosinophils, Absolute 0.25 10*3/mm3      Basophils, Absolute 0.03 10*3/mm3      Immature Grans, Absolute 0.06 10*3/mm3      nRBC 0.0 /100 WBC     Vitamin B12 [[id number]]  (Normal) Collected: 11/17/24 1006    Specimen: Blood from Arm, Right Updated: 11/17/24 1809     Vitamin B-12 406 pg/mL     Narrative:      Results may be falsely increased if patient taking Biotin.              Imaging Results (Last 24 Hours)       ** No results found for the last 24 hours. **            LAB RESULTS (LAST 7 DAYS)    CBC  Results from last 7 days   Lab Units 11/18/24 0203 11/17/24  0040 11/16/24  0006 11/15/24  1208   WBC 10*3/mm3 8.63 12.93* 10.11 11.74*   RBC 10*6/mm3 3.94* 4.05* 3.92* 4.35   HEMOGLOBIN g/dL 12.2* 12.6* 12.0* 13.5   HEMATOCRIT % 37.1* 38.1 37.4* 41.2   MCV fL 94.2 94.1 95.4 94.7   PLATELETS 10*3/mm3 307 325 318 352       BMP  Results from last 7 days   Lab Units 11/18/24 0203 11/17/24  0040 11/16/24  0006 11/15/24  1208   SODIUM mmol/L 138 132* 136 138   POTASSIUM mmol/L 3.8 4.0 4.0 4.1   CHLORIDE mmol/L 104 100 101 101   CO2 mmol/L 24.8 21.9* 26.3 27.9   BUN mg/dL 15 16 20 24*   CREATININE mg/dL 0.91 0.93 1.03 1.12   GLUCOSE mg/dL 87 103* 97 87   MAGNESIUM mg/dL 2.3 2.2 2.2  --    PHOSPHORUS mg/dL 3.1 2.4* 2.7  --        CMP   Results from last 7 days   Lab Units 11/18/24 0203 11/17/24  1006 11/17/24  0040 11/16/24  0006 11/15/24  1208   SODIUM mmol/L 138  --  132* 136 138   POTASSIUM mmol/L 3.8  --  4.0 4.0 4.1   CHLORIDE mmol/L 104  --  100 101 101   CO2 mmol/L 24.8  --  21.9* 26.3 27.9   BUN mg/dL 15  --  16 20 24*   CREATININE mg/dL 0.91  --  0.93 1.03 1.12   GLUCOSE mg/dL 87  --  103* 97 87   ALBUMIN g/dL  --   --   --   --  3.8   BILIRUBIN mg/dL  --   --    --   --  0.4   ALK PHOS U/L  --   --   --   --  98   AST (SGOT) U/L  --   --   --   --  26   ALT (SGPT) U/L  --   --   --   --  22   AMMONIA umol/L  --  17  --   --   --        BNP        TROPONIN  Results from last 7 days   Lab Units 11/16/24  1749   CK TOTAL U/L 237*       CoAg        Creatinine Clearance  Estimated Creatinine Clearance: 77.9 mL/min (by C-G formula based on SCr of 0.91 mg/dL).    ABG          Radiology  CT Head Without Contrast    Result Date: 11/17/2024  Impression: 1. No acute intracranial abnormality identified by noncontrast CT. 2. Parenchymal volume loss and probable chronic small vessel ischemic change. Electronically Signed: Morro Vanegas MD  11/17/2024 12:40 PM EST  Workstation ID: [id number]    CT Abdomen Pelvis With & Without Contrast    Result Date: 11/16/2024  Impression: 1.No acute abnormality identified within the abdomen or pelvis. 2.No hydronephrosis or urinary tract calculi identified. 3.Bilateral renal cysts and subcentimeter hypodensities too small to characterize. Largest cyst within the left kidney measures approximately 5.8 cm and may contain a fine septation. Probable tiny hemorrhagic cyst at the upper pole of the right kidney. No suspicious enhancing renal lesions are seen. Excretory phase images show no suspicious filling defects within the opacified urinary tract. 4.Colonic diverticulosis. 5.Trace bilateral pleural effusions with bibasilar atelectasis. Electronically Signed: Darrin Mcgovern MD  11/16/2024 7:05 PM EST  Workstation ID: [id number]       EKG  I personally viewed and interpreted the patient's EKG/Telemetry data:  ECG 12 Lead QT Measurement   Final Result   HEART RATE=89  bpm   RR Mgwdmexj=680  ms   IL Jrgkkorx=031  ms   P Horizontal Axis=-4  deg   P Front Axis=56  deg   QRSD Interval=90  ms   QT Xlnnulbn=677  ms   ROqL=754  ms   QRS Axis=40  deg   T Wave Axis=60  deg   - ABNORMAL ECG -   Sinus rhythm   Probable  left atrial enlargement   Nonspecific  T  abnormalities, lateral leads   When compared with ECG of 16-Nov-2024 20:38:51,   Significant axis, voltage or hypertrophy change   Electronically Signed By: Shane Mack ([facility]) 2024-11-18 10:15:07   Date and Time of Study:2024-11-18 06:34:06      ECG 12 Lead Tachycardia   Final Result   HEART RATE=97  bpm   RR Ldvzdxqf=103  ms   MT Ifzzrnyc=022  ms   P Horizontal Axis=18  deg   P Front Axis=64  deg   QRSD Interval=89  ms   QT Vbfaffxg=862  ms   GXfC=121  ms   QRS Axis=29  deg   T Wave Axis=73  deg   - ABNORMAL ECG -   Sinus rhythm   Multiple ventricular premature complexes   Consider  left ventricular hypertrophy   Nonspecific  T abnormalities, lateral leads   NO PRIOR TRACING AVAILABLE FOR COMPARISON   Electronically Signed By: Troy Leger ([facility]) 2024-11-17 15:35:19   Date and Time of Study:2024-11-16 20:38:51            Echocardiogram:    Results for orders placed during the hospital encounter of 11/15/24    Adult Transthoracic Echo Complete W/ Cont if Necessary Per Protocol    Interpretation Summary    Left ventricular systolic function is hyperdynamic (EF > 70%). Calculated left ventricular EF = 72.4%    Left ventricular wall thickness is consistent with mild to moderate concentric hypertrophy.    Left ventricular diastolic function is consistent with (grade I) impaired relaxation.    The left atrial cavity is mild to moderately dilated.    Transthoracic echocardiography reveals mild to moderate LVH with EF of 70%  The anterior leaflet of mitral valve is thickened particularly at the tips with calcification and the differential diagnosis can be an old vegetation which is calcified versus calcific disease of the mitral valve and differential diagnosis can also be for rheumatic disease and mild to moderate eccentric MR directed posteriorly and laterally.  No effusion    Electronically signed by Troy Leger MD, 11/17/24, 3:17 PM EST.        Stress Test:        Cardiac Catheterization:  No results found  for this or any previous visit.        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  Grade 1 diastolic dysfunction.  HFpEF is likely secondary to hypertension, mitral regurgitation, obesity  proBNP more than 2000.  Currently on IV diuretics.  Monitor I's and O's and replace electrolytes as needed  Add Jardiance  Will consider elective stress test or coronary CTA outpatient due to multiple risk factors for coronary disease:.    Hypertension  Currently on losartan, hydrochlorothiazide  Consider starting Coreg    Cellulitis  Currently on IV antibiotics.    Anxiety/depression.  Patient is on quetiapine  QTc is 429 ms    Obesity  BMI is 30.  Patient weighs 205 pounds.  Lifestyle modifications discussed with the patient.  Dietary changes and exercise discussed with the patient.  Screening and treatment for sleep apnea recommended        Shane Mack MD  11/18/24  13:31 EST

## 2024-11-18 NOTE — PROGRESS NOTES
[facility] MEDICINE SERVICE  DAILY PROGRESS NOTE    NAME: Roger Nguyen  : 1950  MRN: [id number]      LOS: 3 days     PROVIDER OF SERVICE: Latia Amanda MD    Chief Complaint: Cellulitis    Subjective:     Interval History:  History taken from: patient    Patient seen and examined at bedside this morning.  Counseled on keeping lower extremity bilateral elevated.          Review of Systems:  Negative except as described above  Review of Systems    Objective:     Vital Signs  Temp:  [97.9 °F (36.6 °C)-98.5 °F (36.9 °C)] 98 °F (36.7 °C)  Heart Rate:  [78-89] 78  Resp:  [16-28] 16  BP: (144-173)/(69-82) 156/79   Body mass index is 30.34 kg/m².    Physical Exam  Physical Exam  Constitutional:       Comments: NAD    Cardiovascular:      Comments:  RRR, S1 & S2   Pulmonary:      Comments:  Lungs CTA   Abdominal:      Comments:  ABD soft, NT   Musculoskeletal:      Right lower leg: Edema present.      Left lower leg: Edema present.            Diagnostic Data    Results from last 7 days   Lab Units 24  0203 24  0006 11/15/24  1208   WBC 10*3/mm3 8.63   < > 11.74*   HEMOGLOBIN g/dL 12.2*   < > 13.5   HEMATOCRIT % 37.1*   < > 41.2   PLATELETS 10*3/mm3 307   < > 352   GLUCOSE mg/dL 87   < > 87   CREATININE mg/dL 0.91   < > 1.12   BUN mg/dL 15   < > 24*   SODIUM mmol/L 138   < > 138   POTASSIUM mmol/L 3.8   < > 4.1   AST (SGOT) U/L  --   --  26   ALT (SGPT) U/L  --   --  22   ALK PHOS U/L  --   --  98   BILIRUBIN mg/dL  --   --  0.4   ANION GAP mmol/L 9.2   < > 9.1    < > = values in this interval not displayed.       CT Head Without Contrast    Result Date: 2024  Impression: 1. No acute intracranial abnormality identified by noncontrast CT. 2. Parenchymal volume loss and probable chronic small vessel ischemic change. Electronically Signed: Morro Vanegas MD  2024 12:40 PM EST  Workstation ID: [id number]    CT Abdomen Pelvis With & Without Contrast    Result Date:  11/16/2024  Impression: 1.No acute abnormality identified within the abdomen or pelvis. 2.No hydronephrosis or urinary tract calculi identified. 3.Bilateral renal cysts and subcentimeter hypodensities too small to characterize. Largest cyst within the left kidney measures approximately 5.8 cm and may contain a fine septation. Probable tiny hemorrhagic cyst at the upper pole of the right kidney. No suspicious enhancing renal lesions are seen. Excretory phase images show no suspicious filling defects within the opacified urinary tract. 4.Colonic diverticulosis. 5.Trace bilateral pleural effusions with bibasilar atelectasis. Electronically Signed: Darrin Mcgovren MD  11/16/2024 7:05 PM EST  Workstation ID: [id number]       I reviewed the patient's new clinical results.    Assessment/Plan:     Active and Resolved Problems  Active Hospital Problems    Diagnosis  POA    **Cellulitis [L03.90]  Yes      Resolved Hospital Problems   No resolved problems to display.       #Bilateral lower extremity swelling concerning for cellulitis  -Patient presented with worsened bilateral lower extremity swelling pain and redness.  Per the patient the bilateral lower extremity swelling has been ongoing for a few years and it gradually worsened.  -CRP elevated however Pro-Vahe WNL  -MRSA negative  -TSH WNL  - TTE showed diastolic dysfunction  -Bilateral venous Doppler negative for DVT  -CT lower extremity with contrast shows subcutaneous soft tissue edema in bilateral lower extremities.    -Wound culture came back positive for Pseudomonas so we will switch Rocephin to cefepime  -wound care consulted  -PT OT to see     Diastolic CHF, new diagnosis  -CXR showed cardiomegaly with central pulmonary vascular congestion  - BNP elevated, patient is currently breathing comfortably on room air  -Continue patient on IV Lasix for now  -TTE reviewed  -Cardiology consulted  -Recommend starting on SGLT2 inhibitor prior to discharge  -Will send in referral  to heart failure clinic upon discharge       Hematuria  -Blood seen on 2 different UAs  Urology consulted, ordered CT scan hematuria protocol and state will do a bedside cystoscopy on Monday  Urine culture Show no growth to date     #Hypertension  - DC amlodipine in the setting of bilateral lower extremity edema.  Add hydrochlorothiazide  -Start patient on losartan and hydralazine as needed     #Social issue  -Will need PCP.  Will need blood pressure medication prescription on discharge.    VTE Prophylaxis:  Pharmacologic VTE prophylaxis orders are present.              Time: 35 minutes     There are no questions and answers to display.       Signature: Electronically signed by Latia Amanda MD, 11/18/24, 13:11 EST.  [facility] Hospitalist Team

## 2024-11-18 NOTE — PLAN OF CARE
Problem: Adult Inpatient Plan of Care  Goal: Plan of Care Review  Outcome: Progressing  Goal: Patient-Specific Goal (Individualized)  Outcome: Progressing  Goal: Absence of Hospital-Acquired Illness or Injury  Outcome: Progressing  Intervention: Identify and Manage Fall Risk  Recent Flowsheet Documentation  Taken 11/18/2024 0820 by Isabell Gray, RN  Safety Promotion/Fall Prevention:   safety round/check completed   room organization consistent   nonskid shoes/slippers when out of bed   lighting adjusted   fall prevention program maintained   clutter free environment maintained   assistive device/personal items within reach   activity supervised  Intervention: Prevent Skin Injury  Recent Flowsheet Documentation  Taken 11/18/2024 0820 by Isabell Gray, RN  Body Position: weight shifting  Intervention: Prevent Infection  Recent Flowsheet Documentation  Taken 11/18/2024 0820 by Isabell Gray, RN  Infection Prevention:   environmental surveillance performed   equipment surfaces disinfected   hand hygiene promoted   personal protective equipment utilized   rest/sleep promoted   single patient room provided  Goal: Optimal Comfort and Wellbeing  Outcome: Progressing  Intervention: Provide Person-Centered Care  Recent Flowsheet Documentation  Taken 11/18/2024 0820 by Isabell Gray, RN  Trust Relationship/Rapport:   choices provided   care explained   questions answered   questions encouraged   reassurance provided   thoughts/feelings acknowledged   emotional support provided   empathic listening provided  Goal: Readiness for Transition of Care  Outcome: Progressing   Goal Outcome Evaluation:Pt. Progressing towards goal

## 2024-11-18 NOTE — CASE MANAGEMENT/SOCIAL WORK
Continued Stay Note  LEIGHTON Fowler     Patient Name: Roger Nguyen  MRN: [id number]  Today's Date: 11/18/2024    Admit Date: 11/15/2024    Plan: From home alone. Referrals to [facility] and [facility] pending. Will require precert. PASRR approved.   Discharge Plan       Row Name 11/18/24 1635       Plan    Plan From home alone. Referrals to [facility] and [facility] pending. Will require precert. PASRR approved.    Patient/Family in Agreement with Plan yes    Post Acute Provider List Nursing Home    Delivered To Patient    Method of Delivery In person    Plan Comments CM met with patient at bedside this morning to confirm pt does not have PCP. Explained that he would not be able to get home health services set up without being seen by PCP. Son Bruno at bedside this afternoon and discussed SNF placement. Pt has been to a couple facilities previously for his wife and gave choices of 1) [facility] and 2) [facility]. Referrals sent in-basket and liaisons notified.           Megan Naegele, RN     Office Phone: [phone]  Office Cell: [phone]

## 2024-11-18 NOTE — POST-PROCEDURE NOTE
OP Note  Preop Dx: Gross hematuria  Postop Dx: Dx same  Procedure: Bedside cystoscopy  Surgeon: Gilles Monsivais MD  Anesthesia:  Local  Complications: None  Findings: EBL minimal.  No evidence for any tumor stones.  There is some enlargement of the prostate with mild changes to the bladder     Description of procedure:  Timeout performed at bedside.  Patient was prepped and draped in sterile fashion.  Flexible cystoscope advanced into visual guidance.  Urethra is within normal limits.  Prostate shows hypertrophy that is visually obstructing.  This is almost entirely in the lateral lobes.  There is minimal if any median lobe.  Prostate is approximately 3 cm in length with some prostatic varices.  There was some bleeding with passage of the scope.  Bladder mucosa reveals 1+ trabeculation throughout.  There is no evidence of any tumor or stone.  Both ureteral orifices are visualized.  There is a small diverticulum posterior lateral to the left ureteral orifice.  Cystoscope was removed.  Patient tolerated procedure well without any complications.    Hematuria appears to be from some prostatic enlargement that his cleared grossly.  Patient denies any significant voiding symptoms though he is voiding into a diaper.  Nothing further to add at this time so I will sign off.  Patient to follow-up with me in 3 to 4 weeks.

## 2024-11-18 NOTE — CASE MANAGEMENT/SOCIAL WORK
Continued Stay Note  LEIGHTON Fowler     Patient Name: Roger Nguyen  MRN: [id number]  Today's Date: 11/17/2024    Admit Date: 11/15/2024    Plan: From home alone. PT recommending SNF. Patient requesting HH PT. [facility] referral sent. PASRR approved if needed. Will need pre-cert if he goes to SNF.   Discharge Plan       Row Name 11/17/24 1938       Plan    Plan From home alone. PT recommending SNF. Patient requesting HH PT. [facility] referral sent. PASRR approved if needed. Will need pre-cert if he goes to SNF.    Provided Post Acute Provider List? Yes    Post Acute Provider List Home Health    Delivered To Patient    Method of Delivery In person    Plan Comments PT recommending SNF. CM met with patient at bedside to discuss PT recommendations. Patient stated he would prefer to return home with HH PT and requested [facility]. Referral sent. Liaison notified via secure chat. PASRR requested and approved if needed. Will need pre-cert if he reconsiders SNF.             Yesika Gonzalez RN     Office: [phone]  Fax: [phone]

## 2024-11-18 NOTE — CONSULTS
Patient reports that his spouse passed away several days ago. Shared his grief and life reviewed about their 20 years of marriage. Patient reports to be well supported by family and akosua. Requested spiritual support by prayer.    General follow up.    rito Caruso

## 2024-11-18 NOTE — DISCHARGE PLACEMENT REQUEST
"Roger Summers \"Jason\" (74 y.o. Male)       Date of Birth   1950    Social Security Number       Address   [address] [place] [facility]    Home Phone   [phone]    MRN   [id number]       Restorationist   None    Marital Status                               Admission Date   11/15/24    Admission Type   Emergency    Admitting Provider   Judd Andrew MD    Attending Provider   Latia Amanda MD    Department, Room/Bed   [facility] MEDICAL INPATIENT, 375/1       Discharge Date       Discharge Disposition       Discharge Destination                                 Attending Provider: Latia Amanda MD    Allergies: No Known Allergies    Isolation: None   Infection: None   Code Status: Not on file    Ht: 175.3 cm (69\")   Wt: 93.2 kg (205 lb 7.5 oz)    Admission Cmt: None   Principal Problem: Cellulitis [L03.90]                   Active Insurance as of 11/15/2024       Primary Coverage       Payor Plan Insurance Group Employer/Plan Group    ANTHEM MEDICARE REPLACEMENT ANTHEM MEDICARE ADVANTAGE [id number]       Payor Plan Address Payor Plan Phone Number Payor Plan Fax Number Effective Dates    [address] [phone]  1/1/2024 - None Entered    [facility] [address]         Subscriber Name Subscriber Birth Date Member ID       ROGER SUMMERS 1950 [id number]                     Emergency Contacts        (Rel.) Home Phone Work Phone Mobile Phone    DANNY OROZCO (Son) -- -- [phone]          "

## 2024-11-18 NOTE — THERAPY TREATMENT NOTE
"Subjective: Pt agreeable to therapeutic plan of care.    Objective:     Precautions - Falls    Bed mobility - Supervision  Transfers - Min-A, Mod-A, and with rolling walker STS x 4 from EOB including changing brief in standing. including MIP at RW w/ CGA.   Ambulation - 4 feet side steps towards HOB w/  CGA and with rolling walker    Therapeutic Exercise - 10 Reps Bilaterally AROM unsupported sitting / EOB    Vitals: WNL    Education: Provided education on the importance of mobility in the acute care setting, Verbal/Tactile Cues, Transfer Training, Gait Training, and Energy conservation strategies    Assessment: Roger Nguyen presents with functional mobility impairments which indicate the need for skilled intervention. On arrival Pt brief, gown, and bed soiled and Pt is unaware. Pt w/ poor carryover of safety and technique education. Continue to recommend rehab as Pt is unable to care for himself and is below baseline. Tolerating session today without incident. Will continue to follow and progress as tolerated.     Plan/Recommendations:   If medically appropriate, Moderate Intensity Therapy recommended post-acute care. This is recommended as therapy feels the patient would require 3-4 days per week and wouldn't tolerate \"3 hour daily\" rehab intensity. SNF would be the preferred choice. If the patient does not agree to SNF, arrange HH or OP depending on home bound status. If patient is medically complex, consider LTACH. Pt requires no DME at discharge.     Pt desires Skilled Rehab placement at discharge. Pt cooperative; agreeable to therapeutic recommendations and plan of care.         Basic Mobility 6-click:  Rollin = Total, A lot = 2, A little = 3; 4 = None  Supine>Sit:   1 = Total, A lot = 2, A little = 3; 4 = None   Sit>Stand with arms:  1 = Total, A lot = 2, A little = 3; 4 = None  Bed>Chair:   1 = Total, A lot = 2, A little = 3; 4 = None  Ambulate in room:  1 = Total, A lot = 2, A little = 3; 4 = " None  3-5 Steps with railin = Total, A lot = 2, A little = 3; 4 = None  Score: 15    Modified Piter: N/A = No pre-op stroke/TIA    Post-Tx Position: Supine with HOB Elevated, Alarms activated, and Call light and personal items within reach  PPE: gloves

## 2024-11-18 NOTE — NURSING NOTE
Wound Initial Evaluation  [facility]     Patient Name: Roger Nguyen  : 1950  MRN: [id number]  Today's Date: 2024 Room Number: 375/1      Admit Date: 11/15/2024  Attending: Latia Amanda,*    Consult Requested By: Dr Amanda    Reason For Consult: oleg PUENTE    Chief Complaint: 54-year-old male who presented to the hospital with complaints of lower extremity swelling pain and redness as well as confusion.  Patient is awake and alert.  States that he has had issues with swelling and redness for more than 20 years.  He reports ports that he has never worn any type of compression hose that he would cleanse the areas with soap and water.    Visit Dx:    ICD-10-CM ICD-9-CM   1. 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 No pertinent past medical history.  History reviewed. No pertinent surgical history.  Social History     Socioeconomic History    Marital status:    Tobacco Use    Smoking status: Never    Smokeless tobacco: Never   Vaping Use    Vaping status: Never Used   Substance and Sexual Activity    Alcohol use: Never    Drug use: Never    Sexual activity: Defer       Allergies:  No Known Allergies    Medications:    Current Facility-Administered Medications:     acetaminophen (TYLENOL) tablet 650 mg, 650 mg, Oral, Q4H PRN, 650 mg at 24 0200 **OR** acetaminophen (TYLENOL) 160 MG/5ML oral solution 650 mg, 650 mg, Oral, Q4H PRN **OR** acetaminophen (TYLENOL) suppository 650 mg, 650 mg, Rectal, Q4H PRN, Judd Andrew MD    ALPRAZolam (XANAX) tablet 0.25 mg, 0.25 mg, Oral, BID PRN, Judd Andrew MD, 0.25 mg at 24 0001    aluminum-magnesium hydroxide-simethicone (MAALOX MAX) 400-400-40 MG/5ML suspension 15 mL, 15 mL, Oral, Q6H PRN, Judd Andrew MD    sennosides-docusate (PERICOLACE)  8.6-50 MG per tablet 2 tablet, 2 tablet, Oral, BID PRN **AND** polyethylene glycol (MIRALAX) packet 17 g, 17 g, Oral, Daily PRN **AND** bisacodyl (DULCOLAX) EC tablet 5 mg, 5 mg, Oral, Daily PRN **AND** bisacodyl (DULCOLAX) suppository 10 mg, 10 mg, Rectal, Daily PRN, Judd Andrew MD    Calcium Replacement - Follow Nurse / BPA Driven Protocol, , Not Applicable, PRN, Judd Andrew MD    cefepime 2000 mg IVPB in 100 mL NS (MBP), 2,000 mg, Intravenous, Q8H, Latia Amanda MD, 2,000 mg at 11/18/24 1322    cefepime 2000 mg IVPB in 100 mL NS (MBP), 2,000 mg, Intravenous, Q8H, Latia Amanda MD    furosemide (LASIX) injection 40 mg, 40 mg, Intravenous, Q12H, Latia Amanda MD, 40 mg at 11/18/24 0854    heparin (porcine) 5000 UNIT/ML injection 5,000 Units, 5,000 Units, Subcutaneous, Q12H, Judd Andrew MD, 5,000 Units at 11/18/24 0855    hydrALAZINE (APRESOLINE) injection 10 mg, 10 mg, Intravenous, Q4H PRN, Latia Amanda MD    hydroCHLOROthiazide tablet 25 mg, 25 mg, Oral, Daily, Latia Amanda MD, 25 mg at 11/18/24 1324    iopamidol (ISOVUE-370) 76 % injection 100 mL, 100 mL, Intravenous, Once in imaging, Judd Andrew MD    losartan (COZAAR) tablet 50 mg, 50 mg, Oral, Q24H, Latia Amanda MD, 50 mg at 11/18/24 0855    Magnesium Cardiology Dose Replacement - Follow Nurse / BPA Driven Protocol, , Not Applicable, PRN, Judd Andrew MD    melatonin tablet 5 mg, 5 mg, Oral, Nightly PRN, Judd Andrew MD    nitroglycerin (NITROSTAT) SL tablet 0.4 mg, 0.4 mg, Sublingual, Q5 Min PRN, Judd Andrew MD    ondansetron ODT (ZOFRAN-ODT) disintegrating tablet 4 mg, 4 mg, Oral, Q6H PRN **OR** ondansetron (ZOFRAN) injection 4 mg, 4 mg, Intravenous, Q6H PRN, Judd Andrew MD    Pharmacy to Dose Cefepime, , Not Applicable, Continuous PRN, Latia Amanda MD    Phosphorus Replacement - Follow Nurse / BPA Driven Protocol, , Not Applicable, PRN, Judd Andrew MD    Potassium Replacement -  Follow Nurse / BPA Driven Protocol, , Not Applicable, PRN, Judd Andrew MD    QUEtiapine (SEROquel) tablet 25 mg, 25 mg, Oral, Nightly, Valentina Clifford APRN, 25 mg at 11/17/24 2030    [COMPLETED] Insert Peripheral IV, , , Once **AND** sodium chloride 0.9 % flush 10 mL, 10 mL, Intravenous, PRN, Mario Michel MD    sodium chloride 0.9 % flush 10 mL, 10 mL, Intravenous, Q12H, Judd Andrew MD, 10 mL at 11/18/24 0858    sodium chloride 0.9 % flush 10 mL, 10 mL, Intravenous, PRN, Judd Andrew MD    sodium chloride 0.9 % infusion 40 mL, 40 mL, Intravenous, CLARITA, Judd Andrew MD    traZODone (DESYREL) tablet 50 mg, 50 mg, Oral, Nightly PRN, Judd Andrew MD, 50 mg at 11/17/24 0001    Results Review:  Lab Results (last 48 hours)       Procedure Component Value Units Date/Time    Blood Culture - Blood, Arm, Right [[id number]]  (Normal) Collected: 11/15/24 1255    Specimen: Blood from Arm, Right Updated: 11/18/24 1300     Blood Culture No growth at 3 days    Blood Culture - Blood, Arm, Left [[id number]]  (Normal) Collected: 11/15/24 1232    Specimen: Blood from Arm, Left Updated: 11/18/24 1245     Blood Culture No growth at 3 days    Wound Culture - Wound, Leg [[id number]]  (Abnormal)  (Susceptibility) Collected: 11/16/24 0007    Specimen: Wound from Leg Updated: 11/18/24 0740     Wound Culture Scant growth (1+) Pseudomonas aeruginosa      Rare growth Enterobacter cloacae complex     Comment:   This organism may develop resistance during prolonged therapy with 3rd generation cephalosporins (e.g. ceftriaxone) as a result of de-repression of AmpC B-lactamase.  Ceftriaxone may be a reasonable treatment option for uncomplicated cystitis or other lower severity infections when susceptibility is demonstrated.         Rare growth Streptococcus agalactiae (Group B)     Comment:   This organism is considered to be universally susceptible to penicillin.  No further antibiotic testing will be performed. If Clindamycin or Erythromycin is  the drug of choice, notify the laboratory within 7 days to request susceptibility testing.  Corrected result. Previously Reported Organism Changed. Previous result was Normal [facility] on 11/18/2024 at 0736 EST.         Scant growth (1+) Normal Skin [facility]     Comment:   Appended report. These results have been appended to a previously final verified report.        Gram Stain Rare (1+) WBCs per low power field      No organisms seen    Susceptibility        Pseudomonas aeruginosa      CHEMA      Cefepime Susceptible      Ceftazidime Susceptible      Ciprofloxacin Susceptible      Levofloxacin Susceptible      Piperacillin + Tazobactam Susceptible      Tobramycin Susceptible                       Susceptibility        Enterobacter cloacae complex      CHEMA      Cefepime Susceptible      Ceftazidime Resistant      Gentamicin Susceptible      Levofloxacin Susceptible      Trimethoprim + Sulfamethoxazole Susceptible                       Susceptibility Comments       Pseudomonas aeruginosa    With the exception of urinary-sourced infections, aminoglycosides should not be used as monotherapy.      Enterobacter cloacae complex    With the exception of urinary-sourced infections, aminoglycosides should not be used as monotherapy.               Phosphorus [[id number]]  (Normal) Collected: 11/18/24 0203    Specimen: Blood Updated: 11/18/24 0247     Phosphorus 3.1 mg/dL     Magnesium [[id number]]  (Normal) Collected: 11/18/24 0203    Specimen: Blood Updated: 11/18/24 0239     Magnesium 2.3 mg/dL     Basic Metabolic Panel [[id number]]  (Abnormal) Collected: 11/18/24 0203    Specimen: Blood Updated: 11/18/24 0239     Glucose 87 mg/dL      BUN 15 mg/dL      Creatinine 0.91 mg/dL      Sodium 138 mmol/L      Potassium 3.8 mmol/L      Chloride 104 mmol/L      CO2 24.8 mmol/L      Calcium 8.4 mg/dL      BUN/Creatinine Ratio 16.5     Anion Gap 9.2 mmol/L      eGFR 87.3 mL/min/1.73     Narrative:      GFR Normal >60  Chronic Kidney Disease  <60  Kidney Failure <15    The GFR formula is only valid for adults with stable renal function between ages 18 and 70.    CBC & Differential [[id number]]  (Abnormal) Collected: 11/18/24 0203    Specimen: Blood Updated: 11/18/24 0210    Narrative:      The following orders were created for panel order CBC & Differential.  Procedure                               Abnormality         Status                     ---------                               -----------         ------                     CBC Auto Differential[[id number]]        Abnormal            Final result                 Please view results for these tests on the individual orders.    CBC Auto Differential [[id number]]  (Abnormal) Collected: 11/18/24 0203    Specimen: Blood Updated: 11/18/24 0210     WBC 8.63 10*3/mm3      RBC 3.94 10*6/mm3      Hemoglobin 12.2 g/dL      Hematocrit 37.1 %      MCV 94.2 fL      MCH 31.0 pg      MCHC 32.9 g/dL      RDW 12.5 %      RDW-SD 43.3 fl      MPV 8.8 fL      Platelets 307 10*3/mm3      Neutrophil % 78.3 %      Lymphocyte % 10.2 %      Monocyte % 7.6 %      Eosinophil % 2.9 %      Basophil % 0.3 %      Immature Grans % 0.7 %      Neutrophils, Absolute 6.75 10*3/mm3      Lymphocytes, Absolute 0.88 10*3/mm3      Monocytes, Absolute 0.66 10*3/mm3      Eosinophils, Absolute 0.25 10*3/mm3      Basophils, Absolute 0.03 10*3/mm3      Immature Grans, Absolute 0.06 10*3/mm3      nRBC 0.0 /100 WBC     Vitamin B12 [[id number]]  (Normal) Collected: 11/17/24 1006    Specimen: Blood from Arm, Right Updated: 11/17/24 1809     Vitamin B-12 406 pg/mL     Narrative:      Results may be falsely increased if patient taking Biotin.      Urine Culture - Urine, Urine, Clean Catch [[id number]]  (Normal) Collected: 11/16/24 0833    Specimen: Urine, Clean Catch Updated: 11/17/24 1213     Urine Culture No growth    Urine Culture - Urine, Urine, Clean Catch [[id number]]  (Normal) Collected: 11/15/24 1824    Specimen: Urine, Clean Catch Updated:  11/17/24 1158     Urine Culture No growth    Ammonia [[id number]]  (Normal) Collected: 11/17/24 1006    Specimen: Blood from Arm, Right Updated: 11/17/24 1036     Ammonia 17 umol/L     Magnesium [[id number]]  (Normal) Collected: 11/17/24 0040    Specimen: Blood from Arm, Right Updated: 11/17/24 0224     Magnesium 2.2 mg/dL     Phosphorus [[id number]]  (Abnormal) Collected: 11/17/24 0040    Specimen: Blood from Arm, Right Updated: 11/17/24 0224     Phosphorus 2.4 mg/dL     Basic Metabolic Panel [[id number]]  (Abnormal) Collected: 11/17/24 0040    Specimen: Blood from Arm, Right Updated: 11/17/24 0224     Glucose 103 mg/dL      BUN 16 mg/dL      Creatinine 0.93 mg/dL      Sodium 132 mmol/L      Potassium 4.0 mmol/L      Chloride 100 mmol/L      CO2 21.9 mmol/L      Calcium 8.5 mg/dL      BUN/Creatinine Ratio 17.2     Anion Gap 10.1 mmol/L      eGFR 85.1 mL/min/1.73     Narrative:      GFR Normal >60  Chronic Kidney Disease <60  Kidney Failure <15    The GFR formula is only valid for adults with stable renal function between ages 18 and 70.    CBC & Differential [[id number]]  (Abnormal) Collected: 11/17/24 0040    Specimen: Blood from Arm, Right Updated: 11/17/24 0205    Narrative:      The following orders were created for panel order CBC & Differential.  Procedure                               Abnormality         Status                     ---------                               -----------         ------                     CBC Auto Differential[[id number]]        Abnormal            Final result                 Please view results for these tests on the individual orders.    CBC Auto Differential [[id number]]  (Abnormal) Collected: 11/17/24 0040    Specimen: Blood from Arm, Right Updated: 11/17/24 0205     WBC 12.93 10*3/mm3      RBC 4.05 10*6/mm3      Hemoglobin 12.6 g/dL      Hematocrit 38.1 %      MCV 94.1 fL      MCH 31.1 pg      MCHC 33.1 g/dL      RDW 12.2 %      RDW-SD 42.6 fl      MPV 9.4 fL      Platelets 325  10*3/mm3      Neutrophil % 87.2 %      Lymphocyte % 4.0 %      Monocyte % 7.0 %      Eosinophil % 1.0 %      Basophil % 0.2 %      Immature Grans % 0.6 %      Neutrophils, Absolute 11.26 10*3/mm3      Lymphocytes, Absolute 0.52 10*3/mm3      Monocytes, Absolute 0.91 10*3/mm3      Eosinophils, Absolute 0.13 10*3/mm3      Basophils, Absolute 0.03 10*3/mm3      Immature Grans, Absolute 0.08 10*3/mm3      nRBC 0.0 /100 WBC     CK [[id number]]  (Abnormal) Collected: 11/16/24 1749    Specimen: Blood from Arm, Left Updated: 11/16/24 1833     Creatine Kinase 237 U/L           Imaging Results (Last 72 Hours)       Procedure Component Value Units Date/Time    CT Head Without Contrast [[id number]] Collected: 11/17/24 1233     Updated: 11/17/24 1242    Narrative:      CT HEAD WO CONTRAST    Date of Exam: 11/17/2024 9:13 AM EST    Indication: confusion.    Comparison: None available.    Technique: Axial CT images were obtained of the head without contrast administration.  Coronal reconstructions were performed.  Automated exposure control and iterative reconstruction methods were used.      Findings:  There is parenchymal volume loss. There is moderate periventricular white matter hypodensity most commonly secondary to chronic small vessel ischemic change. Ventricles are somewhat prominent, but this may be due to ex vacuo effect. No prior studies   available for comparison. There is no evidence of mass effect, midline shift, acute hemorrhage or edema. There is normal gray-white differentiation. No abnormal fluid collections are identified. Visualized paranasal sinuses and mastoid air cells are   clear. Soft tissue density in the left external auditory canal likely due to cerumen impaction. No middle ear fluid or mastoid fluid. No acute calvarial defects. Orbits are unremarkable.      Impression:      Impression:    1. No acute intracranial abnormality identified by noncontrast CT.  2. Parenchymal volume loss and probable chronic  small vessel ischemic change.      Electronically Signed: Morro Vanegas MD    11/17/2024 12:40 PM EST    Workstation ID: [id number]    CT Abdomen Pelvis With & Without Contrast [[id number]] Collected: 11/16/24 1858     Updated: 11/16/24 1907    Narrative:      CT ABDOMEN PELVIS W WO CONTRAST    Date of Exam: 11/16/2024 4:45 PM EST    Indication: Gross hematuria.    Comparison: None available.    Technique: Axial CT images were obtained of the abdomen and pelvis before and after the uneventful intravenous administration of iodinated contrast. Sagittal and coronal reconstructions were performed.  Automated exposure control and iterative   reconstruction methods were used.      Findings:    Liver: The liver is unremarkable in morphology. There are multiple small liver cysts and subcentimeter hypodensities which are too small to characterize. No biliary dilation is seen.    Gallbladder: Intermediate density within the gallbladder may represent stones/sludge or vicariously excreted contrast. The gallbladder is otherwise unremarkable.    Pancreas: Unremarkable.    Spleen: Unremarkable.    Adrenal glands: Unremarkable.    Genitourinary tract: [place] left renal cyst measures 5.8 cm and may contain a fine septation. Additional smaller cysts and subcentimeter hypodensities too small to characterize within both kidneys. Probable tiny hemorrhagic cyst at the upper pole of the   right kidney. No hydronephrosis is seen. No urinary tract calculi are seen. The visualized portions of the ureters and urinary bladder appear grossly unremarkable. Excretory phase images show no filling defects within the opacified urinary tract.   Prostate gland is unremarkable.    Gastrointestinal tract: Colonic diverticulosis is present. The hollow viscera appear otherwise unremarkable. There is no evidence of bowel obstruction.    Appendix: No findings to suggest acute appendicitis.    Other findings: No free air or free fluid is identified. No  pathologically enlarged lymph nodes are seen. Vascular calcifications are present. The IVC is unremarkable.    Bones and soft tissues: No acute or suspicious osseous or soft tissue lesion is identified.    Lung bases: Trace bilateral pleural effusions with bibasilar atelectasis.      Impression:      Impression:  1.No acute abnormality identified within the abdomen or pelvis.  2.No hydronephrosis or urinary tract calculi identified.  3.Bilateral renal cysts and subcentimeter hypodensities too small to characterize. Largest cyst within the left kidney measures approximately 5.8 cm and may contain a fine septation. Probable tiny hemorrhagic cyst at the upper pole of the right kidney.   No suspicious enhancing renal lesions are seen. Excretory phase images show no suspicious filling defects within the opacified urinary tract.  4.Colonic diverticulosis.  5.Trace bilateral pleural effusions with bibasilar atelectasis.      Electronically Signed: Darrin Mcgovern MD    11/16/2024 7:05 PM EST    Workstation ID: [id number]    XR Chest 1 View [[id number]] Collected: 11/16/24 1306     Updated: 11/16/24 1310    Narrative:      XR CHEST 1 VW    Date of Exam: 11/16/2024 12:00 PM EST    Indication: fluid overload    Comparison: None available.    Findings:  There are low lung volumes. No focal pulmonary consolidations. There is cardiomegaly. There is central pulmonary vascular congestion. No definite findings to suggest CHF. No pneumothorax or large effusion identified.      Impression:      Impression:  Cardiomegaly with central pulmonary vascular congestion.      Electronically Signed: Morro Vanegas MD    11/16/2024 1:08 PM EST    Workstation ID: [id number]    CT Lower Extremity Bilateral With Contrast [[id number]] Collected: 11/15/24 1910     Updated: 11/15/24 1922    Narrative:      CT LOWER EXTREMITY BILATERAL W CONTRAST    Date of Exam: 11/15/2024 5:12 PM EST    Indication: Cellulitis.    Comparison: None available.    Technique:  Axial CT images were obtained of the bilateral lower extremities after the uneventful intravenous administration of iodinated contrast.  Sagittal and coronal reconstructions were performed.  Automated exposure control and iterative   reconstruction methods were used.      Findings:  Subcutaneous soft tissue edema of both lower extremities from the knees to the feet. No organized fluid collection is seen. No visible soft tissue gas.    Prior plate and screw fixation of the left distal fibula.    No acute fracture or dislocation. No evidence of erosions or aggressive periostitis to strongly suggest acute osteomyelitis. Ossicles and irregularity at the bilateral medial and lateral malleoli, likely sequelae of prior trauma.    Mild tricompartmental osteoarthrosis of both knees. Small right and trace left knee joint effusions, nonspecific. There are also mild to moderate degenerative changes seen in the imaged foot and ankle bilaterally.      Impression:      Impression:  Subcutaneous soft tissue edema involving both lower extremities from the knees to the feet, nonspecific, but may represent cellulitis in the appropriate clinical context. No organized fluid collection or visible soft tissue gas. No radiographic evidence   of acute osteomyelitis.        Electronically Signed: Nando Cesar    11/15/2024 7:20 PM EST    Workstation ID: [id number]            Review of Systems:  Review of Systems   Constitutional: Negative.    HENT: Negative.     Eyes: Negative.    Respiratory: Negative.     Cardiovascular:  Positive for leg swelling.   Musculoskeletal:  Positive for gait problem and myalgias.   Skin:  Positive for rash and wound.   Psychiatric/Behavioral: Negative.         Physical Assessment:  Wound Right lower leg cellulitis (Active)                                                          Patient presenting with multiple superficial shallow ulcerations to bilateral lower extremities.  The dressings that are in place are  adherent and sticking.  The dressings were all gently removed.  There is a great deal of redness, I do believe some of this is a yeast rash.  I am able to easily palpate pedal pulses.  There is light edema noted.  Very much a woody appearance and there is thickened plaques of skin noted.  This is all consistent with chronic venous disease.    His legs were cleansed.  I applied remedy antifungal.  I applied Xeroform gauze and over the pads wrapped from the base of his toes to above his knee using two 4 inch Ace wrap's to each leg.  He tolerated well.  Final diagnosis   Nonpressure ulcer at skin level right lower extremity  Nonpressure ulcer at skin level left lower extremity    Recommendation and Plan  Patient presenting with shallow ulcerations to bilateral lower extremities wound care as above.  Recommend to continue patient likely would benefit from outpatient wound care follow-up in may benefit from multilayer compression dressing.    Charlotte Billingsley, APRN   11/18/2024   13:31 EST

## 2024-11-19 LAB
ANION GAP SERPL CALCULATED.3IONS-SCNC: 10.5 MMOL/L (ref 5–15)
BASOPHILS # BLD AUTO: 0.04 10*3/MM3 (ref 0–0.2)
BASOPHILS NFR BLD AUTO: 0.5 % (ref 0–1.5)
BUN SERPL-MCNC: 18 MG/DL (ref 8–23)
BUN/CREAT SERPL: 15.8 (ref 7–25)
CALCIUM SPEC-SCNC: 8.8 MG/DL (ref 8.6–10.5)
CHLORIDE SERPL-SCNC: 99 MMOL/L (ref 98–107)
CO2 SERPL-SCNC: 26.5 MMOL/L (ref 22–29)
CREAT SERPL-MCNC: 1.14 MG/DL (ref 0.76–1.27)
DEPRECATED RDW RBC AUTO: 42.5 FL (ref 37–54)
EGFRCR SERPLBLD CKD-EPI 2021: 66.7 ML/MIN/1.73
EOSINOPHIL # BLD AUTO: 0.22 10*3/MM3 (ref 0–0.4)
EOSINOPHIL NFR BLD AUTO: 2.7 % (ref 0.3–6.2)
ERYTHROCYTE [DISTWIDTH] IN BLOOD BY AUTOMATED COUNT: 12.4 % (ref 12.3–15.4)
GLUCOSE SERPL-MCNC: 126 MG/DL (ref 65–99)
HCT VFR BLD AUTO: 39.7 % (ref 37.5–51)
HGB BLD-MCNC: 13.4 G/DL (ref 13–17.7)
IMM GRANULOCYTES # BLD AUTO: 0.11 10*3/MM3 (ref 0–0.05)
IMM GRANULOCYTES NFR BLD AUTO: 1.3 % (ref 0–0.5)
LYMPHOCYTES # BLD AUTO: 0.82 10*3/MM3 (ref 0.7–3.1)
LYMPHOCYTES NFR BLD AUTO: 9.9 % (ref 19.6–45.3)
MCH RBC QN AUTO: 31.4 PG (ref 26.6–33)
MCHC RBC AUTO-ENTMCNC: 33.8 G/DL (ref 31.5–35.7)
MCV RBC AUTO: 93 FL (ref 79–97)
MONOCYTES # BLD AUTO: 0.62 10*3/MM3 (ref 0.1–0.9)
MONOCYTES NFR BLD AUTO: 7.5 % (ref 5–12)
NEUTROPHILS NFR BLD AUTO: 6.46 10*3/MM3 (ref 1.7–7)
NEUTROPHILS NFR BLD AUTO: 78.1 % (ref 42.7–76)
NRBC BLD AUTO-RTO: 0 /100 WBC (ref 0–0.2)
PLATELET # BLD AUTO: 348 10*3/MM3 (ref 140–450)
PMV BLD AUTO: 8.8 FL (ref 6–12)
POTASSIUM SERPL-SCNC: 3.6 MMOL/L (ref 3.5–5.2)
RBC # BLD AUTO: 4.27 10*6/MM3 (ref 4.14–5.8)
SODIUM SERPL-SCNC: 136 MMOL/L (ref 136–145)
WBC NRBC COR # BLD AUTO: 8.27 10*3/MM3 (ref 3.4–10.8)

## 2024-11-19 PROCEDURE — 99232 SBSQ HOSP IP/OBS MODERATE 35: CPT | Performed by: INTERNAL MEDICINE

## 2024-11-19 PROCEDURE — 97535 SELF CARE MNGMENT TRAINING: CPT

## 2024-11-19 PROCEDURE — 25010000002 FUROSEMIDE PER 20 MG

## 2024-11-19 PROCEDURE — 25010000002 CEFEPIME PER 500 MG

## 2024-11-19 PROCEDURE — 97530 THERAPEUTIC ACTIVITIES: CPT

## 2024-11-19 PROCEDURE — 97110 THERAPEUTIC EXERCISES: CPT

## 2024-11-19 PROCEDURE — 25010000002 HEPARIN (PORCINE) PER 1000 UNITS: Performed by: INTERNAL MEDICINE

## 2024-11-19 PROCEDURE — 97116 GAIT TRAINING THERAPY: CPT

## 2024-11-19 RX ADMIN — CEFEPIME 2000 MG: 2 INJECTION, POWDER, FOR SOLUTION INTRAVENOUS at 12:25

## 2024-11-19 RX ADMIN — HEPARIN SODIUM 5000 UNITS: 5000 INJECTION INTRAVENOUS; SUBCUTANEOUS at 21:43

## 2024-11-19 RX ADMIN — HEPARIN SODIUM 5000 UNITS: 5000 INJECTION INTRAVENOUS; SUBCUTANEOUS at 08:41

## 2024-11-19 RX ADMIN — FUROSEMIDE 40 MG: 10 INJECTION, SOLUTION INTRAMUSCULAR; INTRAVENOUS at 21:43

## 2024-11-19 RX ADMIN — HYDROCHLOROTHIAZIDE 25 MG: 25 TABLET ORAL at 08:40

## 2024-11-19 RX ADMIN — Medication 10 ML: at 21:44

## 2024-11-19 RX ADMIN — CEFEPIME 2000 MG: 2 INJECTION, POWDER, FOR SOLUTION INTRAVENOUS at 21:43

## 2024-11-19 RX ADMIN — QUETIAPINE FUMARATE 25 MG: 25 TABLET ORAL at 21:44

## 2024-11-19 RX ADMIN — LOSARTAN POTASSIUM 50 MG: 50 TABLET, FILM COATED ORAL at 08:40

## 2024-11-19 RX ADMIN — CARVEDILOL 6.25 MG: 6.25 TABLET, FILM COATED ORAL at 08:40

## 2024-11-19 RX ADMIN — EMPAGLIFLOZIN 10 MG: 10 TABLET, FILM COATED ORAL at 08:40

## 2024-11-19 RX ADMIN — CEFEPIME 2000 MG: 2 INJECTION, POWDER, FOR SOLUTION INTRAVENOUS at 05:33

## 2024-11-19 RX ADMIN — Medication 10 ML: at 08:40

## 2024-11-19 RX ADMIN — FUROSEMIDE 40 MG: 10 INJECTION, SOLUTION INTRAMUSCULAR; INTRAVENOUS at 08:41

## 2024-11-19 RX ADMIN — CARVEDILOL 6.25 MG: 6.25 TABLET, FILM COATED ORAL at 17:02

## 2024-11-19 NOTE — PROGRESS NOTES
[facility] MEDICINE SERVICE  DAILY PROGRESS NOTE    NAME: Roger Nguyen  : 1950  MRN: [id number]      LOS: 4 days     PROVIDER OF SERVICE: Latia Amanda MD    Chief Complaint: Cellulitis    Subjective:     Interval History:  History taken from: patient    Patient seen and examined at bedside this morning.  No acute complaints overnight.  States that his breathing is better.  His bilateral lower extremity swelling is also coming down        Review of Systems: Negative except as described above  Review of Systems    Objective:     Vital Signs  Temp:  [97.2 °F (36.2 °C)-98.8 °F (37.1 °C)] 98.2 °F (36.8 °C)  Heart Rate:  [72-89] 75  Resp:  [11-21] 11  BP: (137-168)/(71-86) 137/76  Flow (L/min) (Oxygen Therapy):  [2] 2   Body mass index is 30.34 kg/m².    Physical Exam  Physical Exam  Constitutional:       Comments: NAD    Cardiovascular:      Comments:  RRR, S1 & S2   Pulmonary:      Comments:  Lungs CTA   Abdominal:      Comments:  ABD soft, NT   Musculoskeletal:      Right lower leg: Edema present.      Left lower leg: Edema present.            Diagnostic Data    Results from last 7 days   Lab Units 24  2347 24  0006 11/15/24  1208   WBC 10*3/mm3 8.27   < > 11.74*   HEMOGLOBIN g/dL 13.4   < > 13.5   HEMATOCRIT % 39.7   < > 41.2   PLATELETS 10*3/mm3 348   < > 352   GLUCOSE mg/dL 126*   < > 87   CREATININE mg/dL 1.14   < > 1.12   BUN mg/dL 18   < > 24*   SODIUM mmol/L 136   < > 138   POTASSIUM mmol/L 3.6   < > 4.1   AST (SGOT) U/L  --   --  26   ALT (SGPT) U/L  --   --  22   ALK PHOS U/L  --   --  98   BILIRUBIN mg/dL  --   --  0.4   ANION GAP mmol/L 10.5   < > 9.1    < > = values in this interval not displayed.       No radiology results for the last day      I reviewed the patient's new clinical results.    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       #Bilateral lower extremity swelling due to Diastolic CHF  -Patient presented with worsened bilateral lower extremity swelling pain and redness.  Per the patient the bilateral lower extremity swelling has been ongoing for a few years and it gradually worsened.  -CRP elevated however Pro-Vahe WNL  -MRSA negative  -TSH WNL  - TTE showed diastolic dysfunction  -Bilateral venous Doppler negative for DVT  -CT lower extremity with contrast shows subcutaneous soft tissue edema in bilateral lower extremities.    -Wound culture came back positive for Pseudomonas so we will switch Rocephin to cefepime  -wound care consulted  -PT OT to see      Diastolic CHF, new diagnosis  -CXR showed cardiomegaly with central pulmonary vascular congestion  - BNP elevated, patient is currently breathing comfortably on room air  -Continue patient on IV Lasix for now  -TTE reviewed  -Cardiology consulted, states that will consider elective stress test versus coronary CTA outpatient  -Recommend starting on SGLT2 inhibitor prior to discharge  -Will send in referral to heart failure clinic upon discharge        Hematuria  -Blood seen on 2 different UAs  Urology consulted, ordered CT scan hematuria protocol and state will do a bedside cystoscopy on Monday  Urine culture Show no growth to date     #Hypertension  - DC amlodipine in the setting of bilateral lower extremity edema.  Add hydrochlorothiazide  -Continue patient on losartan and hydralazine as needed    Onychomycosis  Podiatry consulted     #Social issue  -Will need PCP.  Will need blood pressure medication prescription on discharge.    VTE Prophylaxis:  Pharmacologic VTE prophylaxis orders are present.                Time: 35 minutes     There are no questions and answers to display.       Signature:  Electronically signed by Latia Amanda MD, 11/19/24, 12:00 EST.  [facility]

## 2024-11-19 NOTE — PROGRESS NOTES
"Transitions of Care (test claim):    Patient insurance type: Medicare - this means they are NOT eligible for a monthly discount card in the future (see \"free month\" note below)  Is patient signed up for M2B? no    Drug Jardiance:   Covered/PA Required: Covered without PA  Copay Per Month: $47.00  Is the medication eligible for a trial card/copay card? Free trial available from [facility] [place]      For billing questions please call [facility] Pharmacist at ext: [phone]  For M2B questions please call [facility] at ext: [phone]     Lindsay Perry PharmD, BCPS    "

## 2024-11-19 NOTE — PLAN OF CARE
"Assessment: Roger Nguyen presents with functional mobility impairments which indicate the need for skilled intervention. Tolerating session today without incident. Pt slightly confused throughout session, becoming easily distracted, requiring cueing to reorient to task at hand. Pt requires min A for bed mobility, min A for functional transfers, max A for ADLs, and min A to amb 40 feet using RW. Recommending SNF at discharge to address functional deficits. Will continue to follow and progress as tolerated.     Plan/Recommendations:   If medically appropriate, Moderate Intensity Therapy recommended post-acute care. This is recommended as therapy feels the patient would require 3-4 days per week and wouldn't tolerate \"3 hour daily\" rehab intensity. SNF would be the preferred choice. If the patient does not agree to SNF, arrange HH or OP depending on home bound status. If patient is medically complex, consider LTACH. Pt requires no DME at discharge.     Pt desires Skilled Rehab placement at discharge. Pt cooperative; agreeable to therapeutic recommendations and plan of care.     "

## 2024-11-19 NOTE — NURSING NOTE
Podiatry consult called, Dr. Soto returned call, updated on reason for consult, states he would like to f/u OP rather than in acute setting.

## 2024-11-19 NOTE — PLAN OF CARE
Goal Outcome Evaluation:                                          Patient with no new issues. Patient is having watery diarrhea, order placed to rule out c. Diff as patient is on IV antibiotics. Patient had cysto this AM, urology signed off, f/u 3-4 weeks outpatient. Patient takes medications whole. Patient able to answer orientation questions appropriately but then at times seems pleasantly confused. Patient is from home but referrals sent to [facility], precert needed.

## 2024-11-19 NOTE — THERAPY TREATMENT NOTE
"Subjective: Pt agreeable to therapeutic plan of care.    Objective:     Bed mobility - Supine to sitting min A using bedrails, HOB elevated. Mod cueing on hand placement, task segmentation.     Transfers - STS from EOB Min A using RW. While standing, pt with mild posterior lean, requiring cueing to shift weight forward.     ADLs - Max A for brief management, pericare while sitting/standing.     Ambulation - 40 feet Min-A and with rolling walker. Decreased akash with mod-max cueing on direction, as pt is easily distracted.     Therapeutic Exercise - 20 Reps B LE AROM supported sitting / chair    Vitals: WNL    Pain: 0 VAS   Location:   Intervention for pain: N/A    Education: Provided education on the importance of mobility in the acute care setting, Verbal/Tactile Cues, Transfer Training, and Gait Training    Assessment: Roger Nguyen presents with functional mobility impairments which indicate the need for skilled intervention. Tolerating session today without incident. Pt slightly confused throughout session, becoming easily distracted, requiring cueing to reorient to task at hand. Pt requires min A for bed mobility, min A for functional transfers, max A for ADLs, and min A to amb 40 feet using RW. Recommending SNF at discharge to address functional deficits. Will continue to follow and progress as tolerated.     Plan/Recommendations:   If medically appropriate, Moderate Intensity Therapy recommended post-acute care. This is recommended as therapy feels the patient would require 3-4 days per week and wouldn't tolerate \"3 hour daily\" rehab intensity. SNF would be the preferred choice. If the patient does not agree to SNF, arrange HH or OP depending on home bound status. If patient is medically complex, consider LTACH. Pt requires no DME at discharge.     Pt desires Skilled Rehab placement at discharge. Pt cooperative; agreeable to therapeutic recommendations and plan of care.         Basic Mobility " 6-click:  Rollin = Total, A lot = 2, A little = 3; 4 = None  Supine>Sit:   1 = Total, A lot = 2, A little = 3; 4 = None   Sit>Stand with arms:  1 = Total, A lot = 2, A little = 3; 4 = None  Bed>Chair:   1 = Total, A lot = 2, A little = 3; 4 = None  Ambulate in room:  1 = Total, A lot = 2, A little = 3; 4 = None  3-5 Steps with railin = Total, A lot = 2, A little = 3; 4 = None  Score: 16    Modified [place]: N/A = No pre-op stroke/TIA    Post-Tx Position: Up in Chair, Alarms activated, and Call light and personal items within reach  PPE: gloves    Therapy Charges for Today       Code Description Service Date Service Provider Modifiers Qty    43828619855 HC GAIT TRAINING EA 15 MIN 2024 Maged Zuniga PTA GP 1    63029212899 HC PT THERAPEUTIC ACT EA 15 MIN 2024 Maged Zuniga PTA GP 1    86182943312 HC PT SELF CARE/MGMT/TRAIN EA 15 MIN 2024 Maged Zuniga PTA GP 1    74447122436 HC PT THER PROC EA 15 MIN 2024 Maged Zuniga PTA GP 1           PT Charges       Row Name 24 1708             Time Calculation    Start Time 1415  -UN      Stop Time 1450  -UN      Time Calculation (min) 35 min  -UN      PT Received On 24  -UN      PT - Next Appointment 24  -UN         Time Calculation- PT    Total Timed Code Minutes- PT 35 minute(s)  -UN                User Key  (r) = Recorded By, (t) = Taken By, (c) = Cosigned By      Initials Name Provider Type    Maged Cueto PTA Physical Therapist Assistant

## 2024-11-19 NOTE — PROGRESS NOTES
Referring Provider: Latia Amanda,*    Reason for follow-up: HFpEF     Patient Care Team:  Provider, Ada Known as PCP - General      SUBJECTIVE  Reports improvement in leg edema and shortness of breath.  Son-in-law at bedside     ROS  Review of all systems negative except as indicated.    Since I have last seen, the patient has been without any chest discomfort, shortness of breath, palpitations, dizziness or syncope.  Denies having any headache, abdominal pain, nausea, vomiting, diarrhea, constipation, loss of weight or loss of appetite.  Denies having any excessive bruising, hematuria or blood in the stool.        Personal History:    History reviewed. No pertinent past medical history.    History reviewed. No pertinent surgical history.    History reviewed. No pertinent family history.    Social History     Tobacco Use    Smoking status: Never    Smokeless tobacco: Never   Vaping Use    Vaping status: Never Used   Substance Use Topics    Alcohol use: Never    Drug use: Never        Home meds:  Prior to Admission medications    Medication Sig Start Date End Date Taking? Authorizing Provider   VITAMIN D, CHOLECALCIFEROL, PO Take 1 capsule by mouth Daily.   Yes Provider, MD Rosemary   buPROPion XL (WELLBUTRIN XL) 150 MG 24 hr tablet TAKE ONE TABLET BY MOUTH DAILY  Patient not taking: Reported on 11/15/2024 6/22/22   Juan Harding MD   lisinopril-hydrochlorothiazide (PRINZIDE,ZESTORETIC) 20-12.5 MG per tablet TAKE ONE TABLET BY MOUTH DAILY  Patient not taking: Reported on 11/15/2024 7/27/22   Juan Harding MD   vitamin D (ERGOCALCIFEROL) 1.25 MG (12760 UT) capsule capsule TAKE ONE CAPSULE BY MOUTH ONCE WEEKLY  Patient not taking: Reported on 11/15/2024 2/20/20   Valentina Andrade MD       Allergies:  Patient has no known allergies.    Scheduled Meds:carvedilol, 6.25 mg, Oral, BID With Meals  cefepime, 2,000 mg, Intravenous, Q8H  empagliflozin, 10 mg, Oral, Daily  furosemide, 40 mg,  "Intravenous, Q12H  heparin (porcine), 5,000 Units, Subcutaneous, Q12H  hydroCHLOROthiazide, 25 mg, Oral, Daily  iopamidol, 100 mL, Intravenous, Once in imaging  losartan, 50 mg, Oral, Q24H  QUEtiapine, 25 mg, Oral, Nightly  sodium chloride, 10 mL, Intravenous, Q12H      Continuous Infusions:Pharmacy to Dose Cefepime,       PRN Meds:.  acetaminophen **OR** acetaminophen **OR** acetaminophen    ALPRAZolam    aluminum-magnesium hydroxide-simethicone    senna-docusate sodium **AND** polyethylene glycol **AND** bisacodyl **AND** bisacodyl    Calcium Replacement - Follow Nurse / BPA Driven Protocol    hydrALAZINE    Magnesium Cardiology Dose Replacement - Follow Nurse / BPA Driven Protocol    melatonin    nitroglycerin    ondansetron ODT **OR** ondansetron    Pharmacy to Dose Cefepime    Phosphorus Replacement - Follow Nurse / BPA Driven Protocol    Potassium Replacement - Follow Nurse / BPA Driven Protocol    [COMPLETED] Insert Peripheral IV **AND** sodium chloride    sodium chloride    sodium chloride    traZODone      OBJECTIVE    Vital Signs  Vitals:    11/18/24 1938 11/19/24 0000 11/19/24 0349 11/19/24 0725   BP: 168/74 139/71 164/73 163/82   BP Location: Right arm Right arm Right arm Right arm   Patient Position: Lying Lying Lying Lying   Pulse: 89 73 72 72   Resp: 18 21 18 18   Temp: 97.9 °F (36.6 °C) 98.8 °F (37.1 °C) 97.2 °F (36.2 °C)    TempSrc: Oral Oral Oral    SpO2: 98% 94% 97% 95%   Weight:       Height:           Flowsheet Rows      Flowsheet Row First Filed Value   Admission Height 172.7 cm (68\") Documented at 11/15/2024 1134   Admission Weight 93.8 kg (206 lb 12.7 oz) Documented at 11/15/2024 1133              Intake/Output Summary (Last 24 hours) at 11/19/2024 0739  Last data filed at 11/19/2024 0349  Gross per 24 hour   Intake 840 ml   Output 100 ml   Net 740 ml          Telemetry: Sinus rhythm    Physical Exam:  The patient is alert, oriented and in no distress.  Vital signs as noted above.  Head and " neck revealed no carotid bruits or jugular venous distention.  No thyromegaly or lymphadenopathy is present  Lungs clear.  No wheezing.  Breath sounds are normal bilaterally.  Heart normal first and second heart sounds.  No murmur. No precordial rub is present.  No gallop is present.  Abdomen soft and nontender.  No organomegaly is present.  Extremities bilateral lower extremity wrapped in Ace wrap.  Skin warm and dry.  Musculoskeletal system is grossly normal.  CNS grossly normal.       Results Review:  I have personally reviewed the results from the time of this admission to 11/19/2024 07:39 EST and agree with these findings:  []  Laboratory  []  Microbiology  []  Radiology  []  EKG/Telemetry   []  Cardiology/Vascular   []  Pathology  []  Old records  []  Other:    Most notable findings include:    Lab Results (last 24 hours)       Procedure Component Value Units Date/Time    Basic Metabolic Panel [[id number]]  (Abnormal) Collected: 11/18/24 2347    Specimen: Blood from Arm, Right Updated: 11/19/24 0024     Glucose 126 mg/dL      BUN 18 mg/dL      Creatinine 1.14 mg/dL      Sodium 136 mmol/L      Potassium 3.6 mmol/L      Chloride 99 mmol/L      CO2 26.5 mmol/L      Calcium 8.8 mg/dL      BUN/Creatinine Ratio 15.8     Anion Gap 10.5 mmol/L      eGFR 66.7 mL/min/1.73     Narrative:      GFR Normal >60  Chronic Kidney Disease <60  Kidney Failure <15    The GFR formula is only valid for adults with stable renal function between ages 18 and 70.    CBC & Differential [[id number]]  (Abnormal) Collected: 11/18/24 2347    Specimen: Blood from Arm, Right Updated: 11/19/24 0004    Narrative:      The following orders were created for panel order CBC & Differential.  Procedure                               Abnormality         Status                     ---------                               -----------         ------                     CBC Auto Differential[[id number]]        Abnormal            Final result                  Please view results for these tests on the individual orders.    CBC Auto Differential [[id number]]  (Abnormal) Collected: 11/18/24 2347    Specimen: Blood from Arm, Right Updated: 11/19/24 0004     WBC 8.27 10*3/mm3      RBC 4.27 10*6/mm3      Hemoglobin 13.4 g/dL      Hematocrit 39.7 %      MCV 93.0 fL      MCH 31.4 pg      MCHC 33.8 g/dL      RDW 12.4 %      RDW-SD 42.5 fl      MPV 8.8 fL      Platelets 348 10*3/mm3      Neutrophil % 78.1 %      Lymphocyte % 9.9 %      Monocyte % 7.5 %      Eosinophil % 2.7 %      Basophil % 0.5 %      Immature Grans % 1.3 %      Neutrophils, Absolute 6.46 10*3/mm3      Lymphocytes, Absolute 0.82 10*3/mm3      Monocytes, Absolute 0.62 10*3/mm3      Eosinophils, Absolute 0.22 10*3/mm3      Basophils, Absolute 0.04 10*3/mm3      Immature Grans, Absolute 0.11 10*3/mm3      nRBC 0.0 /100 WBC     Blood Culture - Blood, Arm, Right [[id number]]  (Normal) Collected: 11/15/24 1255    Specimen: Blood from Arm, Right Updated: 11/18/24 1300     Blood Culture No growth at 3 days    Blood Culture - Blood, Arm, Left [[id number]]  (Normal) Collected: 11/15/24 1232    Specimen: Blood from Arm, Left Updated: 11/18/24 1245     Blood Culture No growth at 3 days    Wound Culture - Wound, Leg [[id number]]  (Abnormal)  (Susceptibility) Collected: 11/16/24 0007    Specimen: Wound from Leg Updated: 11/18/24 0740     Wound Culture Scant growth (1+) Pseudomonas aeruginosa      Rare growth Enterobacter cloacae complex     Comment:   This organism may develop resistance during prolonged therapy with 3rd generation cephalosporins (e.g. ceftriaxone) as a result of de-repression of AmpC B-lactamase.  Ceftriaxone may be a reasonable treatment option for uncomplicated cystitis or other lower severity infections when susceptibility is demonstrated.         Rare growth Streptococcus agalactiae (Group B)     Comment:   This organism is considered to be universally susceptible to penicillin.  No further antibiotic  testing will be performed. If Clindamycin or Erythromycin is the drug of choice, notify the laboratory within 7 days to request susceptibility testing.  Corrected result. Previously Reported Organism Changed. Previous result was Normal Skin Vida on 11/18/2024 at 0736 EST.         Scant growth (1+) Normal Skin Vida     Comment:   Appended report. These results have been appended to a previously final verified report.        Gram Stain Rare (1+) WBCs per low power field      No organisms seen    Susceptibility        Pseudomonas aeruginosa      CHEMA      Cefepime Susceptible      Ceftazidime Susceptible      Ciprofloxacin Susceptible      Levofloxacin Susceptible      Piperacillin + Tazobactam Susceptible      Tobramycin Susceptible                       Susceptibility        Enterobacter cloacae complex      CHEMA      Cefepime Susceptible      Ceftazidime Resistant      Gentamicin Susceptible      Levofloxacin Susceptible      Trimethoprim + Sulfamethoxazole Susceptible                       Susceptibility Comments       Pseudomonas aeruginosa    With the exception of urinary-sourced infections, aminoglycosides should not be used as monotherapy.      Enterobacter cloacae complex    With the exception of urinary-sourced infections, aminoglycosides should not be used as monotherapy.                       Imaging Results (Last 24 Hours)       ** No results found for the last 24 hours. **            LAB RESULTS (LAST 7 DAYS)    CBC  Results from last 7 days   Lab Units 11/18/24 2347 11/18/24 0203 11/17/24 0040 11/16/24 0006 11/15/24  1208   WBC 10*3/mm3 8.27 8.63 12.93* 10.11 11.74*   RBC 10*6/mm3 4.27 3.94* 4.05* 3.92* 4.35   HEMOGLOBIN g/dL 13.4 12.2* 12.6* 12.0* 13.5   HEMATOCRIT % 39.7 37.1* 38.1 37.4* 41.2   MCV fL 93.0 94.2 94.1 95.4 94.7   PLATELETS 10*3/mm3 348 307 325 318 352       BMP  Results from last 7 days   Lab Units 11/18/24 2347 11/18/24 0203 11/17/24 0040 11/16/24  0006 11/15/24  1208   SODIUM  mmol/L 136 138 132* 136 138   POTASSIUM mmol/L 3.6 3.8 4.0 4.0 4.1   CHLORIDE mmol/L 99 104 100 101 101   CO2 mmol/L 26.5 24.8 21.9* 26.3 27.9   BUN mg/dL 18 15 16 20 24*   CREATININE mg/dL 1.14 0.91 0.93 1.03 1.12   GLUCOSE mg/dL 126* 87 103* 97 87   MAGNESIUM mg/dL  --  2.3 2.2 2.2  --    PHOSPHORUS mg/dL  --  3.1 2.4* 2.7  --        CMP   Results from last 7 days   Lab Units 11/18/24  2347 11/18/24  0203 11/17/24  1006 11/17/24  0040 11/16/24  0006 11/15/24  1208   SODIUM mmol/L 136 138  --  132* 136 138   POTASSIUM mmol/L 3.6 3.8  --  4.0 4.0 4.1   CHLORIDE mmol/L 99 104  --  100 101 101   CO2 mmol/L 26.5 24.8  --  21.9* 26.3 27.9   BUN mg/dL 18 15  --  16 20 24*   CREATININE mg/dL 1.14 0.91  --  0.93 1.03 1.12   GLUCOSE mg/dL 126* 87  --  103* 97 87   ALBUMIN g/dL  --   --   --   --   --  3.8   BILIRUBIN mg/dL  --   --   --   --   --  0.4   ALK PHOS U/L  --   --   --   --   --  98   AST (SGOT) U/L  --   --   --   --   --  26   ALT (SGPT) U/L  --   --   --   --   --  22   AMMONIA umol/L  --   --  17  --   --   --        BNP        TROPONIN  Results from last 7 days   Lab Units 11/16/24  1749   CK TOTAL U/L 237*       CoAg        Creatinine Clearance  Estimated Creatinine Clearance: 62.1 mL/min (by C-G formula based on SCr of 1.14 mg/dL).    ABG        Radiology  CT Head Without Contrast    Result Date: 11/17/2024  Impression: 1. No acute intracranial abnormality identified by noncontrast CT. 2. Parenchymal volume loss and probable chronic small vessel ischemic change. Electronically Signed: Morro Vanegas MD  11/17/2024 12:40 PM EST  Workstation ID: [id number]       EKG  I personally viewed and interpreted the patient's EKG/Telemetry data:  ECG 12 Lead QT Measurement   Final Result   HEART RATE=89  bpm   RR Qudmnvkk=277  ms   TN Bajztwni=307  ms   P Horizontal Axis=-4  deg   P Front Axis=56  deg   QRSD Interval=90  ms   QT Apntevyo=480  ms   LEoL=885  ms   QRS Axis=40  deg   T Wave Axis=60  deg   - ABNORMAL ECG -    Sinus rhythm   Probable  left atrial enlargement   Nonspecific  T abnormalities, lateral leads   When compared with ECG of 16-Nov-2024 20:38:51,   Significant axis, voltage or hypertrophy change   Electronically Signed By: Shane Mack ([facility]) 2024-11-18 10:15:07   Date and Time of Study:2024-11-18 06:34:06      ECG 12 Lead Tachycardia   Final Result   HEART RATE=97  bpm   RR Pqrtvjrt=080  ms   NC Wruykyex=167  ms   P Horizontal Axis=18  deg   P Front Axis=64  deg   QRSD Interval=89  ms   QT Myenpzmr=800  ms   ONvH=628  ms   QRS Axis=29  deg   T Wave Axis=73  deg   - ABNORMAL ECG -   Sinus rhythm   Multiple ventricular premature complexes   Consider  left ventricular hypertrophy   Nonspecific  T abnormalities, lateral leads   NO PRIOR TRACING AVAILABLE FOR COMPARISON   Electronically Signed By: Troy Leger ([facility]) 2024-11-17 15:35:19   Date and Time of Study:2024-11-16 20:38:51            Echocardiogram:    Results for orders placed during the hospital encounter of 11/15/24    Adult Transthoracic Echo Complete W/ Cont if Necessary Per Protocol    Interpretation Summary    Left ventricular systolic function is hyperdynamic (EF > 70%). Calculated left ventricular EF = 72.4%    Left ventricular wall thickness is consistent with mild to moderate concentric hypertrophy.    Left ventricular diastolic function is consistent with (grade I) impaired relaxation.    The left atrial cavity is mild to moderately dilated.    Transthoracic echocardiography reveals mild to moderate LVH with EF of 70%  The anterior leaflet of mitral valve is thickened particularly at the tips with calcification and the differential diagnosis can be an old vegetation which is calcified versus calcific disease of the mitral valve and differential diagnosis can also be for rheumatic disease and mild to moderate eccentric MR directed posteriorly and laterally.  No effusion    Electronically signed by Troy Leger MD, 11/17/24, 3:17 PM  EST.        Stress Test:         Cardiac Catheterization:  No results found for this or any previous visit.         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  Grade 1 diastolic dysfunction.  HFpEF is likely secondary to hypertension, mitral regurgitation, obesity  proBNP more than 2000.  Currently on IV diuretics.  Another day of IV diuresis  Monitor I's and O's and replace electrolytes as needed  Continue Jardiance, beta-blocker and losartan  Discussed elective stress test or coronary CTA outpatient due to multiple risk factors for coronary disease:.     Hypertension  Currently on losartan, hydrochlorothiazide  Added Coreg  Blood pressure and heart rate are well-controlled     Cellulitis  Currently on IV antibiotics.     Anxiety/depression.  Patient is on quetiapine  QTc is 429 ms     Obesity  BMI is 30.  Patient weighs 205 pounds.  Lifestyle modifications discussed with the patient.  Dietary changes and exercise discussed with the patient.  Screening and treatment for sleep apnea recommended      Shane Mack MD  11/19/24  07:39 EST

## 2024-11-19 NOTE — PLAN OF CARE
Problem: Adult Inpatient Plan of Care  Goal: Plan of Care Review  Outcome: Progressing  Goal: Patient-Specific Goal (Individualized)  Outcome: Progressing  Goal: Absence of Hospital-Acquired Illness or Injury  Outcome: Progressing  Intervention: Identify and Manage Fall Risk  Recent Flowsheet Documentation  Taken 11/19/2024 0840 by Isabell Grya, RN  Safety Promotion/Fall Prevention:   safety round/check completed   room organization consistent   nonskid shoes/slippers when out of bed  Intervention: Prevent Skin Injury  Recent Flowsheet Documentation  Taken 11/19/2024 0840 by Isabell Gray, RN  Body Position: supine  Intervention: Prevent Infection  Recent Flowsheet Documentation  Taken 11/19/2024 0840 by Isabell Gray, RN  Infection Prevention:   visitors restricted/screened   single patient room provided   rest/sleep promoted   personal protective equipment utilized   hand hygiene promoted  Goal: Optimal Comfort and Wellbeing  Outcome: Progressing  Intervention: Provide Person-Centered Care  Recent Flowsheet Documentation  Taken 11/19/2024 0840 by Isabell Gray, RN  Trust Relationship/Rapport:   care explained   choices provided   empathic listening provided   emotional support provided   questions answered   questions encouraged   reassurance provided   thoughts/feelings acknowledged  Goal: Readiness for Transition of Care  Outcome: Progressing   Goal Outcome Evaluation:Pt. Progressing towards goal.

## 2024-11-19 NOTE — CASE MANAGEMENT/SOCIAL WORK
Continued Stay Note  LEIGHTON Malcolm     Patient Name: Roger Nguyen  MRN: [id number]  Today's Date: 11/19/2024    Admit Date: 11/15/2024    Plan: [facility] accepted. Will require precert. PASRR approved.   Discharge Plan       Row Name 11/19/24 1633       Plan    Plan [facility] accepted. Will require precert. PASRR approved.    Patient/Family in Agreement with Plan yes    Plan Comments CM spoke with patient and son Bruno today at bedside. Explained that referral to [facility] remained pending but [facility] is able to accept. Son requested update on expected discharge date and explained that hospitalist is planning on tomorrow 11/20 but depends on specialists signing off, labs, etc. Received update from lialee ann Oconnor this afternoon that [facility] would not have bed available for pt. AMANDA contacted [facility] lialee ann Brice to anticipate dc tomorrow. NICHOL De La Torre notified to start precert. Pharmacy updated to [facility].             Megan Naegele, RN     Office Phone: [phone]  Office Cell: [phone]

## 2024-11-20 PROBLEM — I34.0 MITRAL REGURGITATION: Status: ACTIVE | Noted: 2024-11-20

## 2024-11-20 PROBLEM — F41.9 ANXIETY AND DEPRESSION: Chronic | Status: ACTIVE | Noted: 2024-11-20

## 2024-11-20 PROBLEM — F32.A ANXIETY AND DEPRESSION: Chronic | Status: ACTIVE | Noted: 2024-11-20

## 2024-11-20 PROBLEM — M72.0 DUPUYTREN'S CONTRACTURE: Status: ACTIVE | Noted: 2017-12-14

## 2024-11-20 PROBLEM — N17.9 AKI (ACUTE KIDNEY INJURY): Status: ACTIVE | Noted: 2024-11-20

## 2024-11-20 PROBLEM — K21.9 GASTROESOPHAGEAL REFLUX DISEASE: Status: ACTIVE | Noted: 2024-11-20

## 2024-11-20 PROBLEM — K63.5 COLON POLYPS: Status: ACTIVE | Noted: 2024-11-20

## 2024-11-20 PROBLEM — I10 PRIMARY HYPERTENSION: Chronic | Status: ACTIVE | Noted: 2024-11-20

## 2024-11-20 PROBLEM — Z80.0 FAMILY HISTORY OF MALIGNANT NEOPLASM OF COLON: Status: ACTIVE | Noted: 2024-11-20

## 2024-11-20 PROBLEM — E29.1 HYPOGONADISM IN MALE: Status: ACTIVE | Noted: 2024-11-20

## 2024-11-20 PROBLEM — I50.31 ACUTE HEART FAILURE WITH PRESERVED EJECTION FRACTION (HFPEF): Status: ACTIVE | Noted: 2024-11-20

## 2024-11-20 PROBLEM — Z80.1 FAMILY HISTORY OF LUNG CANCER: Status: ACTIVE | Noted: 2024-11-20

## 2024-11-20 PROBLEM — I10 HYPERTENSION: Status: ACTIVE | Noted: 2024-11-20

## 2024-11-20 PROBLEM — E66.9 OBESITY (BMI 30-39.9): Status: ACTIVE | Noted: 2024-11-20

## 2024-11-20 LAB
ANION GAP SERPL CALCULATED.3IONS-SCNC: 11.6 MMOL/L (ref 5–15)
BACTERIA SPEC AEROBE CULT: NORMAL
BACTERIA SPEC AEROBE CULT: NORMAL
BASOPHILS # BLD AUTO: 0.06 10*3/MM3 (ref 0–0.2)
BASOPHILS NFR BLD AUTO: 0.7 % (ref 0–1.5)
BILIRUB UR QL STRIP: NEGATIVE
BUN SERPL-MCNC: 27 MG/DL (ref 8–23)
BUN/CREAT SERPL: 19.7 (ref 7–25)
CALCIUM SPEC-SCNC: 9.6 MG/DL (ref 8.6–10.5)
CHLORIDE SERPL-SCNC: 95 MMOL/L (ref 98–107)
CLARITY UR: CLEAR
CO2 SERPL-SCNC: 30.4 MMOL/L (ref 22–29)
COLOR UR: YELLOW
CREAT SERPL-MCNC: 1.37 MG/DL (ref 0.76–1.27)
CREAT UR-MCNC: 113.7 MG/DL
DEPRECATED RDW RBC AUTO: 44.3 FL (ref 37–54)
EGFRCR SERPLBLD CKD-EPI 2021: 53.5 ML/MIN/1.73
EOSINOPHIL # BLD AUTO: 0.3 10*3/MM3 (ref 0–0.4)
EOSINOPHIL NFR BLD AUTO: 3.5 % (ref 0.3–6.2)
ERYTHROCYTE [DISTWIDTH] IN BLOOD BY AUTOMATED COUNT: 12.4 % (ref 12.3–15.4)
GLUCOSE BLDC GLUCOMTR-MCNC: 108 MG/DL (ref 70–105)
GLUCOSE BLDC GLUCOMTR-MCNC: 60 MG/DL (ref 70–105)
GLUCOSE BLDC GLUCOMTR-MCNC: 68 MG/DL (ref 70–105)
GLUCOSE BLDC GLUCOMTR-MCNC: 69 MG/DL (ref 70–105)
GLUCOSE SERPL-MCNC: 87 MG/DL (ref 65–99)
GLUCOSE UR STRIP-MCNC: ABNORMAL MG/DL
HCT VFR BLD AUTO: 47.7 % (ref 37.5–51)
HGB BLD-MCNC: 14.9 G/DL (ref 13–17.7)
HGB UR QL STRIP.AUTO: NEGATIVE
IMM GRANULOCYTES # BLD AUTO: 0.14 10*3/MM3 (ref 0–0.05)
IMM GRANULOCYTES NFR BLD AUTO: 1.6 % (ref 0–0.5)
KETONES UR QL STRIP: ABNORMAL
LEUKOCYTE ESTERASE UR QL STRIP.AUTO: NEGATIVE
LYMPHOCYTES # BLD AUTO: 0.98 10*3/MM3 (ref 0.7–3.1)
LYMPHOCYTES NFR BLD AUTO: 11.3 % (ref 19.6–45.3)
MCH RBC QN AUTO: 30.4 PG (ref 26.6–33)
MCHC RBC AUTO-ENTMCNC: 31.2 G/DL (ref 31.5–35.7)
MCV RBC AUTO: 97.3 FL (ref 79–97)
MONOCYTES # BLD AUTO: 0.68 10*3/MM3 (ref 0.1–0.9)
MONOCYTES NFR BLD AUTO: 7.9 % (ref 5–12)
NEUTROPHILS NFR BLD AUTO: 6.5 10*3/MM3 (ref 1.7–7)
NEUTROPHILS NFR BLD AUTO: 75 % (ref 42.7–76)
NITRITE UR QL STRIP: NEGATIVE
NRBC BLD AUTO-RTO: 0 /100 WBC (ref 0–0.2)
PH UR STRIP.AUTO: <=5 [PH] (ref 5–8)
PLATELET # BLD AUTO: 353 10*3/MM3 (ref 140–450)
PMV BLD AUTO: 9.3 FL (ref 6–12)
POTASSIUM SERPL-SCNC: 3.6 MMOL/L (ref 3.5–5.2)
POTASSIUM SERPL-SCNC: 4.1 MMOL/L (ref 3.5–5.2)
PROT UR QL STRIP: ABNORMAL
RBC # BLD AUTO: 4.9 10*6/MM3 (ref 4.14–5.8)
SODIUM SERPL-SCNC: 137 MMOL/L (ref 136–145)
SODIUM UR-SCNC: 99 MMOL/L
SP GR UR STRIP: 1.02 (ref 1–1.03)
UROBILINOGEN UR QL STRIP: ABNORMAL
WBC NRBC COR # BLD AUTO: 8.66 10*3/MM3 (ref 3.4–10.8)

## 2024-11-20 PROCEDURE — 25010000002 HEPARIN (PORCINE) PER 1000 UNITS: Performed by: INTERNAL MEDICINE

## 2024-11-20 PROCEDURE — 81003 URINALYSIS AUTO W/O SCOPE: CPT

## 2024-11-20 PROCEDURE — 85025 COMPLETE CBC W/AUTO DIFF WBC: CPT

## 2024-11-20 PROCEDURE — 82948 REAGENT STRIP/BLOOD GLUCOSE: CPT

## 2024-11-20 PROCEDURE — 97116 GAIT TRAINING THERAPY: CPT

## 2024-11-20 PROCEDURE — 25010000002 CEFEPIME PER 500 MG

## 2024-11-20 PROCEDURE — 80048 BASIC METABOLIC PNL TOTAL CA: CPT

## 2024-11-20 PROCEDURE — 97530 THERAPEUTIC ACTIVITIES: CPT

## 2024-11-20 PROCEDURE — 97110 THERAPEUTIC EXERCISES: CPT

## 2024-11-20 PROCEDURE — 84132 ASSAY OF SERUM POTASSIUM: CPT

## 2024-11-20 PROCEDURE — 97112 NEUROMUSCULAR REEDUCATION: CPT

## 2024-11-20 PROCEDURE — 82570 ASSAY OF URINE CREATININE: CPT

## 2024-11-20 PROCEDURE — 99232 SBSQ HOSP IP/OBS MODERATE 35: CPT | Performed by: INTERNAL MEDICINE

## 2024-11-20 PROCEDURE — 84300 ASSAY OF URINE SODIUM: CPT

## 2024-11-20 RX ORDER — AMLODIPINE BESYLATE 5 MG/1
5 TABLET ORAL
Status: DISCONTINUED | OUTPATIENT
Start: 2024-11-20 | End: 2024-11-23 | Stop reason: HOSPADM

## 2024-11-20 RX ORDER — DEXTROSE MONOHYDRATE 25 G/50ML
50 INJECTION, SOLUTION INTRAVENOUS
Status: DISCONTINUED | OUTPATIENT
Start: 2024-11-20 | End: 2024-11-23 | Stop reason: HOSPADM

## 2024-11-20 RX ORDER — POTASSIUM CHLORIDE 1500 MG/1
40 TABLET, EXTENDED RELEASE ORAL EVERY 4 HOURS
Status: COMPLETED | OUTPATIENT
Start: 2024-11-20 | End: 2024-11-20

## 2024-11-20 RX ADMIN — HEPARIN SODIUM 5000 UNITS: 5000 INJECTION INTRAVENOUS; SUBCUTANEOUS at 08:02

## 2024-11-20 RX ADMIN — CARVEDILOL 6.25 MG: 6.25 TABLET, FILM COATED ORAL at 17:10

## 2024-11-20 RX ADMIN — CEFEPIME 2000 MG: 2 INJECTION, POWDER, FOR SOLUTION INTRAVENOUS at 17:09

## 2024-11-20 RX ADMIN — HEPARIN SODIUM 5000 UNITS: 5000 INJECTION INTRAVENOUS; SUBCUTANEOUS at 21:30

## 2024-11-20 RX ADMIN — ALPRAZOLAM 0.25 MG: 0.25 TABLET ORAL at 08:03

## 2024-11-20 RX ADMIN — CEFEPIME 2000 MG: 2 INJECTION, POWDER, FOR SOLUTION INTRAVENOUS at 05:38

## 2024-11-20 RX ADMIN — EMPAGLIFLOZIN 10 MG: 10 TABLET, FILM COATED ORAL at 08:03

## 2024-11-20 RX ADMIN — ACETAMINOPHEN 650 MG: 325 TABLET, FILM COATED ORAL at 21:30

## 2024-11-20 RX ADMIN — QUETIAPINE FUMARATE 25 MG: 25 TABLET ORAL at 21:30

## 2024-11-20 RX ADMIN — Medication 10 ML: at 21:30

## 2024-11-20 RX ADMIN — CARVEDILOL 6.25 MG: 6.25 TABLET, FILM COATED ORAL at 08:03

## 2024-11-20 RX ADMIN — AMLODIPINE BESYLATE 5 MG: 5 TABLET ORAL at 09:10

## 2024-11-20 RX ADMIN — DEXTROSE MONOHYDRATE 50 ML: 25 INJECTION, SOLUTION INTRAVENOUS at 13:47

## 2024-11-20 RX ADMIN — Medication 10 ML: at 08:09

## 2024-11-20 RX ADMIN — POTASSIUM CHLORIDE 40 MEQ: 1500 TABLET, EXTENDED RELEASE ORAL at 12:26

## 2024-11-20 RX ADMIN — POTASSIUM CHLORIDE 40 MEQ: 1500 TABLET, EXTENDED RELEASE ORAL at 08:03

## 2024-11-20 RX ADMIN — LOSARTAN POTASSIUM 50 MG: 50 TABLET, FILM COATED ORAL at 08:03

## 2024-11-20 NOTE — PROGRESS NOTES
[facility] MEDICINE SERVICE  DAILY PROGRESS NOTE    NAME: Roger Nguyen  : 1950  MRN: [id number]      LOS: 5 days     PROVIDER OF SERVICE: Latia Amanda MD    Chief Complaint: Cellulitis    Subjective:     Interval History:  History taken from: patient    Patient seen and examined at bedside this morning.  No acute complaints overnight.  Counseled on keeping legs elevated.  He is making good urine.  Denies any shortness of breath        Review of Systems: Negative except described above  Review of Systems    Objective:     Vital Signs  Temp:  [98.1 °F (36.7 °C)-98.9 °F (37.2 °C)] 98.9 °F (37.2 °C)  Heart Rate:  [67-74] 74  Resp:  [10-17] 12  BP: (131-180)/() 180/93  Flow (L/min) (Oxygen Therapy):  [2] 2   Body mass index is 27.91 kg/m².    Physical Exam  Physical Exam  Constitutional:       Comments: NAD    Cardiovascular:      Comments:  RRR, S1 & S2   Pulmonary:      Comments:  Lungs CTA   Abdominal:      Comments:  ABD soft, NT            Diagnostic Data    Results from last 7 days   Lab Units 24  0546 24  0006 11/15/24  1208   WBC 10*3/mm3 8.66   < > 11.74*   HEMOGLOBIN g/dL 14.9   < > 13.5   HEMATOCRIT % 47.7   < > 41.2   PLATELETS 10*3/mm3 353   < > 352   GLUCOSE mg/dL 87   < > 87   CREATININE mg/dL 1.37*   < > 1.12   BUN mg/dL 27*   < > 24*   SODIUM mmol/L 137   < > 138   POTASSIUM mmol/L 3.6   < > 4.1   AST (SGOT) U/L  --   --  26   ALT (SGPT) U/L  --   --  22   ALK PHOS U/L  --   --  98   BILIRUBIN mg/dL  --   --  0.4   ANION GAP mmol/L 11.6   < > 9.1    < > = values in this interval not displayed.       No radiology results for the last day      I reviewed the patient's new clinical results.    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       #Bilateral lower extremity swelling due to Diastolic CHF  -Patient presented with worsened bilateral lower extremity swelling pain and redness.  Per the patient the bilateral lower extremity swelling has been ongoing for a few years and it gradually worsened.  -CRP elevated however Pro-Vahe WNL  -MRSA negative  -TSH WNL  - TTE showed diastolic dysfunction  -Bilateral venous Doppler negative for DVT  -CT lower extremity with contrast shows subcutaneous soft tissue edema in bilateral lower extremities.    -Wound culture came back positive for Pseudomonas so we will switch Rocephin to cefepime  -wound care consulted  -PT OT to see      Diastolic CHF, new diagnosis  -CXR showed cardiomegaly with central pulmonary vascular congestion  - BNP elevated, patient is currently breathing comfortably on room air  -Hold IV Lasix for now due to KELLY  -TTE reviewed  -Cardiology consulted, states that will consider elective stress test versus coronary CTA outpatient  -Recommend starting on SGLT2 inhibitor prior to discharge  -Will send in referral to heart failure clinic upon discharge     KELLY likely due to diuresis  -UA, urine sodium, urine creatinine ordered  -Avoid nephrotoxic medication  -Continue to monitor renal function       Hematuria  -Blood seen on 2 different UAs  Urology consulted, ordered CT scan hematuria protocol and state will do a bedside cystoscopy on Monday  Urine culture Show no growth to date     #Hypertension  -Hold hydrochlorothiazide and losartan in setting of KELLY, start low-dose amlodipine hydralazine as needed     Onychomycosis  Podiatry  consulted     #Social issue  -Will need PCP.  Will need blood pressure medication prescription on discharge.    VTE Prophylaxis:  Pharmacologic VTE prophylaxis orders are present.                Time: 35 minutes     There are no questions and answers to display.       Signature: Electronically signed by Latia Amanda MD, 11/20/24, 13:08 EST.  [facility]

## 2024-11-20 NOTE — PLAN OF CARE
Goal Outcome Evaluation:               Patient sleeping well throughout shift. Placed on 2L NC to maintain saturation above 90%. Patient stable at this time.

## 2024-11-20 NOTE — PLAN OF CARE
"Assessment: Roger Nguyen presents with functional mobility impairments which indicate the need for skilled intervention. Tolerating session today without incident. Pt continues to present with confusion and becomes easily distracted, causing LOB episodes while upright. Will continue to follow and progress as tolerated.     Plan/Recommendations:   If medically appropriate, Moderate Intensity Therapy recommended post-acute care. This is recommended as therapy feels the patient would require 3-4 days per week and wouldn't tolerate \"3 hour daily\" rehab intensity. SNF would be the preferred choice. If the patient does not agree to SNF, arrange HH or OP depending on home bound status. If patient is medically complex, consider LTACH. Pt requires no DME at discharge.     Pt desires Skilled Rehab placement at discharge. Pt cooperative; agreeable to therapeutic recommendations and plan of care.        "

## 2024-11-20 NOTE — THERAPY TREATMENT NOTE
"Subjective: Pt agreeable to therapeutic plan of care. Pt up in chair upon arrival.     Objective:     Precautions - BLE wounds    Bed mobility - N/A or Not attempted.    Transfers - STS Min A using RW. Cued on hand placement and on shifting weight forward while coming to stand.     Balance - Supported upright balance CGA-min A, with pt easily losing balance when distracted, challenged.     Ambulation - 2x40 feet Min-A and with rolling walker. 2-3 LOB episodes, requiring min-mod A to regain balance and prevent fall.    Therapeutic Exercise - 20 Reps B LE AROM supported sitting / chair    Vitals: WNL    Pain: 0 VAS   Location:   Intervention for pain: N/A    Education: Provided education on the importance of mobility in the acute care setting, Verbal/Tactile Cues, Transfer Training, and Gait Training    Assessment: Roger Nguyen presents with functional mobility impairments which indicate the need for skilled intervention. Tolerating session today without incident. Pt continues to present with confusion and becomes easily distracted, causing LOB episodes while upright. Will continue to follow and progress as tolerated.     Plan/Recommendations:   If medically appropriate, Moderate Intensity Therapy recommended post-acute care. This is recommended as therapy feels the patient would require 3-4 days per week and wouldn't tolerate \"3 hour daily\" rehab intensity. SNF would be the preferred choice. If the patient does not agree to SNF, arrange HH or OP depending on home bound status. If patient is medically complex, consider LTACH. Pt requires no DME at discharge.     Pt desires Skilled Rehab placement at discharge. Pt cooperative; agreeable to therapeutic recommendations and plan of care.         Basic Mobility 6-click:  Rollin = Total, A lot = 2, A little = 3; 4 = None  Supine>Sit:   1 = Total, A lot = 2, A little = 3; 4 = None   Sit>Stand with arms:  1 = Total, A lot = 2, A little = 3; 4 = None  Bed>Chair: "   1 = Total, A lot = 2, A little = 3; 4 = None  Ambulate in room:  1 = Total, A lot = 2, A little = 3; 4 = None  3-5 Steps with railin = Total, A lot = 2, A little = 3; 4 = None  Score: 16    Modified Piter: N/A = No pre-op stroke/TIA    Post-Tx Position: Up in Chair, Alarms activated, and Call light and personal items within reach  PPE: gloves    Therapy Charges for Today       Code Description Service Date Service Provider Modifiers Qty    62384006193 HC GAIT TRAINING EA 15 MIN 2024 Maged Zuniga, PTA GP 1    42212080089 HC PT THERAPEUTIC ACT EA 15 MIN 2024 Maged Zuniga, PTA GP 1    01652078748 HC PT SELF CARE/MGMT/TRAIN EA 15 MIN 2024 Maged Zuniga, PTA GP 1    68269682517 HC PT THER PROC EA 15 MIN 2024 Maged Zuniga, PTA GP 1    30145456789 HC GAIT TRAINING EA 15 MIN 2024 Maged Zuniga, PTA GP 1    45170751721 HC PT THERAPEUTIC ACT EA 15 MIN 2024 Maged Zuniga, PTA GP 1    60582825217 HC PT NEUROMUSC RE EDUCATION EA 15 MIN 2024 Maged Zuniga, PTA GP 1    24022282104 HC PT THER PROC EA 15 MIN 2024 Maged Zuniga, PTA GP 1           PT Charges       Row Name 24 1304             Time Calculation    Start Time 1029  -UN      Stop Time 1101  -UN      Time Calculation (min) 32 min  -UN      PT Received On 24  -UN      PT - Next Appointment 24  -UN         Time Calculation- PT    Total Timed Code Minutes- PT 32 minute(s)  -UN                User Key  (r) = Recorded By, (t) = Taken By, (c) = Cosigned By      Initials Name Provider Type    UN Maged Zuniga PTA Physical Therapist Assistant

## 2024-11-20 NOTE — PLAN OF CARE
Goal Outcome Evaluation:              Outcome Evaluation: patient is alert and oriented on room air with some confusion at times. No complaints of pain. On iv abx. Replaced K+. Dressings changed. Plan to go to [place] when d/c

## 2024-11-20 NOTE — CASE MANAGEMENT/SOCIAL WORK
Continued Stay Note  LEIGHTON Fowler     Patient Name: Roger Nguyen  MRN: [id number]  Today's Date: 11/20/2024    Admit Date: 11/15/2024    Plan: [facility] accepted. Precert approved 11/20-11/26. PASRR approved.   Discharge Plan       Row Name 11/20/24 1050       Plan    Plan [facility] accepted. Precert approved 11/20-11/26. PASRR approved.    Patient/Family in Agreement with Plan yes    Plan Comments NICHOL De La Torre started precert this AM for [facility] and it is approved 11/20-11/26. Updated Dr Amanda and nursing by secure chat.             Megan Naegele, RN     Office Phone: [phone]  Office Cell: [phone]

## 2024-11-20 NOTE — PROGRESS NOTES
Referring Provider: Latia Amanda,*    Reason for follow-up: HFpEF     Patient Care Team:  Provider, No Known as PCP - General      SUBJECTIVE  Resting comfortably in bed.     ROS  Review of all systems negative except as indicated.    Since I have last seen, the patient has been without any chest discomfort, shortness of breath, palpitations, dizziness or syncope.  Denies having any headache, abdominal pain, nausea, vomiting, diarrhea, constipation, loss of weight or loss of appetite.  Denies having any excessive bruising, hematuria or blood in the stool.        Personal History:    History reviewed. No pertinent past medical history.    History reviewed. No pertinent surgical history.    History reviewed. No pertinent family history.    Social History     Tobacco Use    Smoking status: Never    Smokeless tobacco: Never   Vaping Use    Vaping status: Never Used   Substance Use Topics    Alcohol use: Never    Drug use: Never        Home meds:  Prior to Admission medications    Medication Sig Start Date End Date Taking? Authorizing Provider   VITAMIN D, CHOLECALCIFEROL, PO Take 1 capsule by mouth Daily.   Yes Provider, MD Rosemary   buPROPion XL (WELLBUTRIN XL) 150 MG 24 hr tablet TAKE ONE TABLET BY MOUTH DAILY  Patient not taking: Reported on 11/15/2024 6/22/22   Juan Harding MD   lisinopril-hydrochlorothiazide (PRINZIDE,ZESTORETIC) 20-12.5 MG per tablet TAKE ONE TABLET BY MOUTH DAILY  Patient not taking: Reported on 11/15/2024 7/27/22   Juan Harding MD   vitamin D (ERGOCALCIFEROL) 1.25 MG (57178 UT) capsule capsule TAKE ONE CAPSULE BY MOUTH ONCE WEEKLY  Patient not taking: Reported on 11/15/2024 2/20/20   Valentina Andrade MD       Allergies:  Patient has no known allergies.    Scheduled Meds:carvedilol, 6.25 mg, Oral, BID With Meals  cefepime, 2,000 mg, Intravenous, Q8H  empagliflozin, 10 mg, Oral, Daily  furosemide, 40 mg, Intravenous, Q12H  heparin (porcine), 5,000 Units,  "Subcutaneous, Q12H  hydroCHLOROthiazide, 25 mg, Oral, Daily  iopamidol, 100 mL, Intravenous, Once in imaging  losartan, 50 mg, Oral, Q24H  QUEtiapine, 25 mg, Oral, Nightly  sodium chloride, 10 mL, Intravenous, Q12H      Continuous Infusions:Pharmacy to Dose Cefepime,       PRN Meds:.  acetaminophen **OR** acetaminophen **OR** acetaminophen    ALPRAZolam    aluminum-magnesium hydroxide-simethicone    senna-docusate sodium **AND** polyethylene glycol **AND** bisacodyl **AND** bisacodyl    Calcium Replacement - Follow Nurse / BPA Driven Protocol    hydrALAZINE    Magnesium Cardiology Dose Replacement - Follow Nurse / BPA Driven Protocol    melatonin    nitroglycerin    ondansetron ODT **OR** ondansetron    Pharmacy to Dose Cefepime    Phosphorus Replacement - Follow Nurse / BPA Driven Protocol    Potassium Replacement - Follow Nurse / BPA Driven Protocol    [COMPLETED] Insert Peripheral IV **AND** sodium chloride    sodium chloride    sodium chloride    traZODone      OBJECTIVE    Vital Signs  Vitals:    11/19/24 1900 11/19/24 2350 11/20/24 0342 11/20/24 0400   BP: 131/74 146/81 144/74    BP Location: Right arm Right arm Right arm    Patient Position: Lying Lying Lying    Pulse: 72 69 67    Resp: 10 14 17    Temp: 98.1 °F (36.7 °C) 98.3 °F (36.8 °C) 98.9 °F (37.2 °C)    TempSrc: Oral Oral Oral    SpO2: 97%   96%   Weight:       Height:           Flowsheet Rows      Flowsheet Row First Filed Value   Admission Height 172.7 cm (68\") Documented at 11/15/2024 1134   Admission Weight 93.8 kg (206 lb 12.7 oz) Documented at 11/15/2024 1133              Intake/Output Summary (Last 24 hours) at 11/20/2024 0641  Last data filed at 11/20/2024 0342  Gross per 24 hour   Intake 120 ml   Output 1450 ml   Net -1330 ml          Telemetry: Sinus rhythm    Physical Exam:  The patient is alert, oriented and in no distress.  Vital signs as noted above.  Head and neck revealed no carotid bruits or jugular venous distention.  No thyromegaly " or lymphadenopathy is present  Lungs clear.  No wheezing.  Breath sounds are normal bilaterally.  Heart normal first and second heart sounds.  No murmur. No precordial rub is present.  No gallop is present.  Abdomen soft and nontender.  No organomegaly is present.  Extremities bilateral lower extremity wrapped in Ace wrap.  Skin warm and dry.  Musculoskeletal system is grossly normal.  CNS grossly normal.       Results Review:  I have personally reviewed the results from the time of this admission to 11/20/2024 06:41 EST and agree with these findings:  []  Laboratory  []  Microbiology  []  Radiology  []  EKG/Telemetry   []  Cardiology/Vascular   []  Pathology  []  Old records  []  Other:    Most notable findings include:    Lab Results (last 24 hours)       Procedure Component Value Units Date/Time    Basic Metabolic Panel [[id number]]  (Abnormal) Collected: 11/20/24 0546    Specimen: Blood from Arm, Right Updated: 11/20/24 0623     Glucose 87 mg/dL      BUN 27 mg/dL      Creatinine 1.37 mg/dL      Sodium 137 mmol/L      Potassium 3.6 mmol/L      Chloride 95 mmol/L      CO2 30.4 mmol/L      Calcium 9.6 mg/dL      BUN/Creatinine Ratio 19.7     Anion Gap 11.6 mmol/L      eGFR 53.5 mL/min/1.73     Narrative:      GFR Normal >60  Chronic Kidney Disease <60  Kidney Failure <15    The GFR formula is only valid for adults with stable renal function between ages 18 and 70.    CBC & Differential [[id number]]  (Abnormal) Collected: 11/20/24 0546    Specimen: Blood from Arm, Right Updated: 11/20/24 0616    Narrative:      The following orders were created for panel order CBC & Differential.  Procedure                               Abnormality         Status                     ---------                               -----------         ------                     CBC Auto Differential[[id number]]        Abnormal            Final result                 Please view results for these tests on the individual orders.    CBC Auto  Differential [[id number]]  (Abnormal) Collected: 11/20/24 0546    Specimen: Blood from Arm, Right Updated: 11/20/24 0616     WBC 8.66 10*3/mm3      RBC 4.90 10*6/mm3      Hemoglobin 14.9 g/dL      Hematocrit 47.7 %      MCV 97.3 fL      MCH 30.4 pg      MCHC 31.2 g/dL      RDW 12.4 %      RDW-SD 44.3 fl      MPV 9.3 fL      Platelets 353 10*3/mm3      Neutrophil % 75.0 %      Lymphocyte % 11.3 %      Monocyte % 7.9 %      Eosinophil % 3.5 %      Basophil % 0.7 %      Immature Grans % 1.6 %      Neutrophils, Absolute 6.50 10*3/mm3      Lymphocytes, Absolute 0.98 10*3/mm3      Monocytes, Absolute 0.68 10*3/mm3      Eosinophils, Absolute 0.30 10*3/mm3      Basophils, Absolute 0.06 10*3/mm3      Immature Grans, Absolute 0.14 10*3/mm3      nRBC 0.0 /100 WBC     Blood Culture - Blood, Arm, Right [[id number]]  (Normal) Collected: 11/15/24 1255    Specimen: Blood from Arm, Right Updated: 11/19/24 1300     Blood Culture No growth at 4 days    Blood Culture - Blood, Arm, Left [[id number]]  (Normal) Collected: 11/15/24 1232    Specimen: Blood from Arm, Left Updated: 11/19/24 1245     Blood Culture No growth at 4 days            Imaging Results (Last 24 Hours)       ** No results found for the last 24 hours. **            LAB RESULTS (LAST 7 DAYS)    CBC  Results from last 7 days   Lab Units 11/20/24  0546 11/18/24  2347 11/18/24  0203 11/17/24  0040 11/16/24  0006 11/15/24  1208   WBC 10*3/mm3 8.66 8.27 8.63 12.93* 10.11 11.74*   RBC 10*6/mm3 4.90 4.27 3.94* 4.05* 3.92* 4.35   HEMOGLOBIN g/dL 14.9 13.4 12.2* 12.6* 12.0* 13.5   HEMATOCRIT % 47.7 39.7 37.1* 38.1 37.4* 41.2   MCV fL 97.3* 93.0 94.2 94.1 95.4 94.7   PLATELETS 10*3/mm3 353 348 307 325 318 352       BMP  Results from last 7 days   Lab Units 11/20/24  0546 11/18/24  2347 11/18/24  0203 11/17/24  0040 11/16/24  0006 11/15/24  1208   SODIUM mmol/L 137 136 138 132* 136 138   POTASSIUM mmol/L 3.6 3.6 3.8 4.0 4.0 4.1   CHLORIDE mmol/L 95* 99 104 100 101 101   CO2 mmol/L  30.4* 26.5 24.8 21.9* 26.3 27.9   BUN mg/dL 27* 18 15 16 20 24*   CREATININE mg/dL 1.37* 1.14 0.91 0.93 1.03 1.12   GLUCOSE mg/dL 87 126* 87 103* 97 87   MAGNESIUM mg/dL  --   --  2.3 2.2 2.2  --    PHOSPHORUS mg/dL  --   --  3.1 2.4* 2.7  --        CMP   Results from last 7 days   Lab Units 11/20/24  0546 11/18/24  2347 11/18/24  0203 11/17/24  1006 11/17/24  0040 11/16/24  0006 11/15/24  1208   SODIUM mmol/L 137 136 138  --  132* 136 138   POTASSIUM mmol/L 3.6 3.6 3.8  --  4.0 4.0 4.1   CHLORIDE mmol/L 95* 99 104  --  100 101 101   CO2 mmol/L 30.4* 26.5 24.8  --  21.9* 26.3 27.9   BUN mg/dL 27* 18 15  --  16 20 24*   CREATININE mg/dL 1.37* 1.14 0.91  --  0.93 1.03 1.12   GLUCOSE mg/dL 87 126* 87  --  103* 97 87   ALBUMIN g/dL  --   --   --   --   --   --  3.8   BILIRUBIN mg/dL  --   --   --   --   --   --  0.4   ALK PHOS U/L  --   --   --   --   --   --  98   AST (SGOT) U/L  --   --   --   --   --   --  26   ALT (SGPT) U/L  --   --   --   --   --   --  22   AMMONIA umol/L  --   --   --  17  --   --   --        BNP        TROPONIN  Results from last 7 days   Lab Units 11/16/24  1749   CK TOTAL U/L 237*       CoAg        Creatinine Clearance  Estimated Creatinine Clearance: 51.7 mL/min (A) (by C-G formula based on SCr of 1.37 mg/dL (H)).    ABG        Radiology  No radiology results for the last day      EKG  I personally viewed and interpreted the patient's EKG/Telemetry data:  ECG 12 Lead QT Measurement   Final Result   HEART RATE=89  bpm   RR Zpuvppou=703  ms   NJ Ldhxymbl=509  ms   P Horizontal Axis=-4  deg   P Front Axis=56  deg   QRSD Interval=90  ms   QT Nfxmmmet=541  ms   YNtK=153  ms   QRS Axis=40  deg   T Wave Axis=60  deg   - ABNORMAL ECG -   Sinus rhythm   Probable  left atrial enlargement   Nonspecific  T abnormalities, lateral leads   When compared with ECG of 16-Nov-2024 20:38:51,   Significant axis, voltage or hypertrophy change   Electronically Signed By: Shane Mack ([facility]) 2024-11-18 10:15:07    Date and Time of Study:2024-11-18 06:34:06      ECG 12 Lead Tachycardia   Final Result   HEART RATE=97  bpm   RR Hnwqnrrd=759  ms   WV Cfxitezq=800  ms   P Horizontal Axis=18  deg   P Front Axis=64  deg   QRSD Interval=89  ms   QT Ssjfvaah=704  ms   NOkR=607  ms   QRS Axis=29  deg   T Wave Axis=73  deg   - ABNORMAL ECG -   Sinus rhythm   Multiple ventricular premature complexes   Consider  left ventricular hypertrophy   Nonspecific  T abnormalities, lateral leads   NO PRIOR TRACING AVAILABLE FOR COMPARISON   Electronically Signed By: Troy Leger ([facility]) 2024-11-17 15:35:19   Date and Time of Study:2024-11-16 20:38:51      Telemetry Scan   Final Result      Telemetry Scan   Final Result      Telemetry Scan   Final Result      Telemetry Scan   Final Result      Telemetry Scan   Final Result      Telemetry Scan   Final Result      Telemetry Scan   Final Result            Echocardiogram:    Results for orders placed during the hospital encounter of 11/15/24    Adult Transthoracic Echo Complete W/ Cont if Necessary Per Protocol    Interpretation Summary    Left ventricular systolic function is hyperdynamic (EF > 70%). Calculated left ventricular EF = 72.4%    Left ventricular wall thickness is consistent with mild to moderate concentric hypertrophy.    Left ventricular diastolic function is consistent with (grade I) impaired relaxation.    The left atrial cavity is mild to moderately dilated.    Transthoracic echocardiography reveals mild to moderate LVH with EF of 70%  The anterior leaflet of mitral valve is thickened particularly at the tips with calcification and the differential diagnosis can be an old vegetation which is calcified versus calcific disease of the mitral valve and differential diagnosis can also be for rheumatic disease and mild to moderate eccentric MR directed posteriorly and laterally.  No effusion    Electronically signed by Troy Leger MD, 11/17/24, 3:17 PM EST.        Stress Test:          Cardiac Catheterization:  No results found for this or any previous visit.         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  Grade 1 diastolic dysfunction.  HFpEF is likely secondary to hypertension, mitral regurgitation, obesity  proBNP more than 2000.  Completed IV diuretics.  Overdiuresis with KELLY  Continue Jardiance, beta-blocker and losartan  Discussed elective stress test or coronary CTA outpatient due to multiple risk factors for coronary disease:    KELLY  Creatinine 1.4, GFR is 53.5.  Stop furosemide and hydrochlorothiazide  Will resume in the next 24 hours     Hypertension  Continue losartan, Coreg  Holding hydrochlorothiazide  Blood pressure and heart rate are well-controlled     Cellulitis  Currently on IV antibiotics.     Anxiety/depression.  Patient is on quetiapine  QTc is 429 ms     Obesity  BMI is 30.  Patient weighs 205 pounds.  Lifestyle modifications discussed with the patient.  Dietary changes and exercise discussed with the patient.  Screening and treatment for sleep apnea recommended      Shane Mack MD  11/20/24  06:41 EST

## 2024-11-21 LAB
ANION GAP SERPL CALCULATED.3IONS-SCNC: 10.2 MMOL/L (ref 5–15)
BASOPHILS # BLD AUTO: 0.06 10*3/MM3 (ref 0–0.2)
BASOPHILS NFR BLD AUTO: 0.6 % (ref 0–1.5)
BUN SERPL-MCNC: 42 MG/DL (ref 8–23)
BUN/CREAT SERPL: 29.6 (ref 7–25)
CALCIUM SPEC-SCNC: 9.4 MG/DL (ref 8.6–10.5)
CHLORIDE SERPL-SCNC: 100 MMOL/L (ref 98–107)
CO2 SERPL-SCNC: 26.8 MMOL/L (ref 22–29)
CREAT SERPL-MCNC: 1.42 MG/DL (ref 0.76–1.27)
DEPRECATED RDW RBC AUTO: 43.8 FL (ref 37–54)
EGFRCR SERPLBLD CKD-EPI 2021: 51.2 ML/MIN/1.73
EOSINOPHIL # BLD AUTO: 0.29 10*3/MM3 (ref 0–0.4)
EOSINOPHIL NFR BLD AUTO: 2.9 % (ref 0.3–6.2)
ERYTHROCYTE [DISTWIDTH] IN BLOOD BY AUTOMATED COUNT: 12.5 % (ref 12.3–15.4)
GLUCOSE SERPL-MCNC: 103 MG/DL (ref 65–99)
HCT VFR BLD AUTO: 43.7 % (ref 37.5–51)
HGB BLD-MCNC: 14.3 G/DL (ref 13–17.7)
IMM GRANULOCYTES # BLD AUTO: 0.39 10*3/MM3 (ref 0–0.05)
IMM GRANULOCYTES NFR BLD AUTO: 3.9 % (ref 0–0.5)
LYMPHOCYTES # BLD AUTO: 1.11 10*3/MM3 (ref 0.7–3.1)
LYMPHOCYTES NFR BLD AUTO: 11 % (ref 19.6–45.3)
MCH RBC QN AUTO: 31 PG (ref 26.6–33)
MCHC RBC AUTO-ENTMCNC: 32.7 G/DL (ref 31.5–35.7)
MCV RBC AUTO: 94.6 FL (ref 79–97)
MONOCYTES # BLD AUTO: 0.71 10*3/MM3 (ref 0.1–0.9)
MONOCYTES NFR BLD AUTO: 7.1 % (ref 5–12)
NEUTROPHILS NFR BLD AUTO: 7.51 10*3/MM3 (ref 1.7–7)
NEUTROPHILS NFR BLD AUTO: 74.5 % (ref 42.7–76)
NRBC BLD AUTO-RTO: 0 /100 WBC (ref 0–0.2)
PLATELET # BLD AUTO: 445 10*3/MM3 (ref 140–450)
PMV BLD AUTO: 9 FL (ref 6–12)
POTASSIUM SERPL-SCNC: 4.3 MMOL/L (ref 3.5–5.2)
RBC # BLD AUTO: 4.62 10*6/MM3 (ref 4.14–5.8)
SODIUM SERPL-SCNC: 137 MMOL/L (ref 136–145)
WBC NRBC COR # BLD AUTO: 10.07 10*3/MM3 (ref 3.4–10.8)

## 2024-11-21 PROCEDURE — 97530 THERAPEUTIC ACTIVITIES: CPT

## 2024-11-21 PROCEDURE — 80048 BASIC METABOLIC PNL TOTAL CA: CPT

## 2024-11-21 PROCEDURE — 85025 COMPLETE CBC W/AUTO DIFF WBC: CPT

## 2024-11-21 PROCEDURE — 97116 GAIT TRAINING THERAPY: CPT

## 2024-11-21 PROCEDURE — 25010000002 HEPARIN (PORCINE) PER 1000 UNITS: Performed by: INTERNAL MEDICINE

## 2024-11-21 PROCEDURE — 99233 SBSQ HOSP IP/OBS HIGH 50: CPT | Performed by: INTERNAL MEDICINE

## 2024-11-21 PROCEDURE — 25010000002 CEFEPIME PER 500 MG

## 2024-11-21 PROCEDURE — 25810000003 SODIUM CHLORIDE 0.9 % SOLUTION: Performed by: INTERNAL MEDICINE

## 2024-11-21 PROCEDURE — 97112 NEUROMUSCULAR REEDUCATION: CPT

## 2024-11-21 PROCEDURE — 97110 THERAPEUTIC EXERCISES: CPT

## 2024-11-21 RX ORDER — SODIUM CHLORIDE 9 MG/ML
100 INJECTION, SOLUTION INTRAVENOUS CONTINUOUS
Status: DISPENSED | OUTPATIENT
Start: 2024-11-21 | End: 2024-11-21

## 2024-11-21 RX ADMIN — CEFEPIME 2000 MG: 2 INJECTION, POWDER, FOR SOLUTION INTRAVENOUS at 17:20

## 2024-11-21 RX ADMIN — CARVEDILOL 6.25 MG: 6.25 TABLET, FILM COATED ORAL at 08:18

## 2024-11-21 RX ADMIN — HEPARIN SODIUM 5000 UNITS: 5000 INJECTION INTRAVENOUS; SUBCUTANEOUS at 20:20

## 2024-11-21 RX ADMIN — SODIUM CHLORIDE 100 ML/HR: 9 INJECTION, SOLUTION INTRAVENOUS at 09:56

## 2024-11-21 RX ADMIN — TRAZODONE HYDROCHLORIDE 50 MG: 50 TABLET ORAL at 00:08

## 2024-11-21 RX ADMIN — Medication 10 ML: at 08:18

## 2024-11-21 RX ADMIN — AMLODIPINE BESYLATE 5 MG: 5 TABLET ORAL at 08:18

## 2024-11-21 RX ADMIN — QUETIAPINE FUMARATE 25 MG: 25 TABLET ORAL at 20:21

## 2024-11-21 RX ADMIN — Medication 10 ML: at 20:21

## 2024-11-21 RX ADMIN — HEPARIN SODIUM 5000 UNITS: 5000 INJECTION INTRAVENOUS; SUBCUTANEOUS at 08:18

## 2024-11-21 RX ADMIN — CEFEPIME 2000 MG: 2 INJECTION, POWDER, FOR SOLUTION INTRAVENOUS at 05:53

## 2024-11-21 RX ADMIN — CARVEDILOL 6.25 MG: 6.25 TABLET, FILM COATED ORAL at 17:19

## 2024-11-21 NOTE — PROGRESS NOTES
[facility] MEDICINE SERVICE  DAILY PROGRESS NOTE    NAME: Roger Nguyen  : 1950  MRN: [id number]      LOS: 6 days     PROVIDER OF SERVICE: Latia Amanda MD    Chief Complaint: Cellulitis    Subjective:     Interval History:  History taken from: patient    Patient seen and examined at bedside this morning.  No acute complaints overnight.  He is making good urine.        Review of Systems: Negative except as described above  Review of Systems    Objective:     Vital Signs  Temp:  [97.4 °F (36.3 °C)-98.7 °F (37.1 °C)] 97.4 °F (36.3 °C)  Heart Rate:  [70-80] 75  Resp:  [12-20] 20  BP: (130-185)/(68-96) 145/69  Flow (L/min) (Oxygen Therapy):  [2] 2   Body mass index is 27.91 kg/m².    Physical Exam  Physical Exam  Constitutional:       Comments: NAD    Cardiovascular:      Comments:  RRR, S1 & S2   Pulmonary:      Comments:  Lungs CTA   Abdominal:      Comments:  ABD soft, NT            Diagnostic Data    Results from last 7 days   Lab Units 24  0400 24  0006 11/15/24  1208   WBC 10*3/mm3 10.07   < > 11.74*   HEMOGLOBIN g/dL 14.3   < > 13.5   HEMATOCRIT % 43.7   < > 41.2   PLATELETS 10*3/mm3 445   < > 352   GLUCOSE mg/dL 103*   < > 87   CREATININE mg/dL 1.42*   < > 1.12   BUN mg/dL 42*   < > 24*   SODIUM mmol/L 137   < > 138   POTASSIUM mmol/L 4.3   < > 4.1   AST (SGOT) U/L  --   --  26   ALT (SGPT) U/L  --   --  22   ALK PHOS U/L  --   --  98   BILIRUBIN mg/dL  --   --  0.4   ANION GAP mmol/L 10.2   < > 9.1    < > = values in this interval not displayed.       No radiology results for the last day      I reviewed the patient's new clinical results.    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       #Bilateral lower extremity swelling due to Diastolic CHF  -Patient presented with worsened bilateral lower extremity swelling pain and redness.  Per the patient the bilateral lower extremity swelling has been ongoing for a few years and it gradually worsened.  -CRP elevated however Pro-Vahe WNL  -MRSA negative  -TSH WNL  - TTE showed diastolic dysfunction  -Bilateral venous Doppler negative for DVT  -CT lower extremity with contrast shows subcutaneous soft tissue edema in bilateral lower extremities.    -Wound culture came back positive for Pseudomonas so we will switch Rocephin to cefepime  -wound care consulted  -PT OT to see      Diastolic CHF, new diagnosis  -CXR showed cardiomegaly with central pulmonary vascular congestion  - BNP elevated, patient is currently breathing comfortably on room air  -Hold IV Lasix for now due to KELLY  -TTE reviewed  -Cardiology consulted, states that will consider elective stress test versus coronary CTA outpatient  -Recommend starting on SGLT2 inhibitor prior to discharge  -Will send in referral to heart failure clinic upon discharge     KELLY likely due to diuresis  -I suspect that his creatinine is leveling out   -UA, urine sodium, urine creatinine reviewed  -Avoid nephrotoxic medication  -Continue to monitor renal function        Hematuria  -Blood seen on 2 different UAs  Urology consulted, ordered CT scan hematuria protocol and state will do a bedside cystoscopy on Monday  Urine culture Show no growth to date     #Hypertension  -Hold hydrochlorothiazide and losartan in setting of KELLY, start low-dose amlodipine hydralazine as needed     Onychomycosis  Podiatry consulted, will  have him follow-up outpatient     #Social issue  -Will need PCP.  Will need blood pressure medication prescription on discharge.    VTE Prophylaxis:  Pharmacologic VTE prophylaxis orders are present.            Time: 35 minutes     There are no questions and answers to display.       Signature: Electronically signed by Latia Amanda MD, 11/21/24, 10:42 EST.  [facility] Hospitalist Team

## 2024-11-21 NOTE — THERAPY TREATMENT NOTE
"Subjective: Pt agreeable to therapeutic plan of care.    Objective:     Precautions - BLE wounds    Bed mobility - Supine to sitting min A using bedrails, HOB elevated. Cueing on task segmentation.     Transfers - CGA and with rolling walker. Cued on positioning, mechanics.     Balance - Supported upright balance CGA-min A, with pt easily losing balance when distracted, challenged.     Ambulation - 100 feet Min-A and with rolling walker. Pt with one minor LOB at beginning of ambulation bout. Cued to keep RW on ground.     Therapeutic Exercise - 20 Reps B LE AROM supported sitting / chair    Vitals: WNL    Pain: 0 VAS   Location:   Intervention for pain: N/A    Education: Provided education on the importance of mobility in the acute care setting, Verbal/Tactile Cues, Transfer Training, and Gait Training    Assessment: Roger Nguyen presents with functional mobility impairments which indicate the need for skilled intervention. Tolerating session today without incident. Pt continues to present with confusion and easily distracted. Had one minor LOB episode while ambulating. Will continue to follow and progress as tolerated.     Plan/Recommendations:   If medically appropriate, Moderate Intensity Therapy recommended post-acute care. This is recommended as therapy feels the patient would require 3-4 days per week and wouldn't tolerate \"3 hour daily\" rehab intensity. SNF would be the preferred choice. If the patient does not agree to SNF, arrange HH or OP depending on home bound status. If patient is medically complex, consider LTACH. Pt requires no DME at discharge.     Pt desires Skilled Rehab placement at discharge. Pt cooperative; agreeable to therapeutic recommendations and plan of care.         Basic Mobility 6-click:  Rollin = Total, A lot = 2, A little = 3; 4 = None  Supine>Sit:   1 = Total, A lot = 2, A little = 3; 4 = None   Sit>Stand with arms:  1 = Total, A lot = 2, A little = 3; 4 = " None  Bed>Chair:   1 = Total, A lot = 2, A little = 3; 4 = None  Ambulate in room:  1 = Total, A lot = 2, A little = 3; 4 = None  3-5 Steps with railin = Total, A lot = 2, A little = 3; 4 = None  Score: 17    Modified Piter: N/A = No pre-op stroke/TIA    Post-Tx Position: Up in Chair, Alarms activated, and Call light and personal items within reach  PPE: gloves    Therapy Charges for Today       Code Description Service Date Service Provider Modifiers Qty    70799532460 HC GAIT TRAINING EA 15 MIN 2024 Maged Zuniga, PTA GP 1    84119138791 HC PT THERAPEUTIC ACT EA 15 MIN 2024 Maged Zuniga, PTA GP 1    75385926146 HC PT NEUROMUSC RE EDUCATION EA 15 MIN 2024 Maged Zuniga, PTA GP 1    98297384837 HC PT THER PROC EA 15 MIN 2024 Maged Zuniga, PTA GP 1    70351302201 HC GAIT TRAINING EA 15 MIN 2024 Maged Zuniga, PTA GP 1    03126065774 HC PT THERAPEUTIC ACT EA 15 MIN 2024 Maged Zuniga, PTA GP 1    12854159179 HC PT NEUROMUSC RE EDUCATION EA 15 MIN 2024 Maged Zuniga, PTA GP 1    43878586318 HC PT THER PROC EA 15 MIN 2024 Maged Zuniga, PTA GP 1           PT Charges       Row Name 24 1628             Time Calculation    Start Time 1102  -UN      Stop Time 1134  -UN      Time Calculation (min) 32 min  -UN      PT Received On 24  -UN      PT - Next Appointment 24  -UN         Time Calculation- PT    Total Timed Code Minutes- PT 32 minute(s)  -UN                User Key  (r) = Recorded By, (t) = Taken By, (c) = Cosigned By      Initials Name Provider Type    UN Maged Zuniga PTA Physical Therapist Assistant

## 2024-11-21 NOTE — PLAN OF CARE
"Assessment: Roger Nguyen presents with functional mobility impairments which indicate the need for skilled intervention. Tolerating session today without incident. Pt continues to present with confusion and easily distracted. Had one minor LOB episode while ambulating. Will continue to follow and progress as tolerated.     Plan/Recommendations:   If medically appropriate, Moderate Intensity Therapy recommended post-acute care. This is recommended as therapy feels the patient would require 3-4 days per week and wouldn't tolerate \"3 hour daily\" rehab intensity. SNF would be the preferred choice. If the patient does not agree to SNF, arrange HH or OP depending on home bound status. If patient is medically complex, consider LTACH. Pt requires no DME at discharge.     Pt desires Skilled Rehab placement at discharge. Pt cooperative; agreeable to therapeutic recommendations and plan of care.     "

## 2024-11-21 NOTE — CONSULTS
"Heart Failure Program  Nurse Navigator  Discharge Planning    Patient Name:Roger Nguyen  :1950  Cardiologist:Frederick  Current Admission Date: 11/15/2024   Previous Admission:    Admission frequency: 1 admissions in 6 months    Heart Failure history per record:    Symptoms on admission:c/o swelling lower legs bilaterally with weeping, pt advises his wife passed away one week ago. Pt states he does not have a PCP.       Admission Weight:  Flowsheet Rows      Flowsheet Row First Filed Value   Admission Height 172.7 cm (68\") Documented at 11/15/2024 1134   Admission Weight 93.8 kg (206 lb 12.7 oz) Documented at 11/15/2024 1133              Current Home Medications:  Prior to Admission medications    Medication Sig Start Date End Date Taking? Authorizing Provider   VITAMIN D, CHOLECALCIFEROL, PO Take 1 capsule by mouth Daily.   Yes Provider, MD Rosemary   buPROPion XL (WELLBUTRIN XL) 150 MG 24 hr tablet TAKE ONE TABLET BY MOUTH DAILY  Patient not taking: Reported on 11/15/2024 6/22/22   Juan Harding MD   lisinopril-hydrochlorothiazide (PRINZIDE,ZESTORETIC) 20-12.5 MG per tablet TAKE ONE TABLET BY MOUTH DAILY  Patient not taking: Reported on 11/15/2024 7/27/22   Juan Harding MD   vitamin D (ERGOCALCIFEROL) 1.25 MG (16381 UT) capsule capsule TAKE ONE CAPSULE BY MOUTH ONCE WEEKLY  Patient not taking: Reported on 11/15/2024 2/20/20   Valentina Andrade MD       Social history:   Pt from home, pt spouse recently passed away.    Smoking status:     Diagnostics Testing:  proBNP level: 2181    Echocardiogram:Results for orders placed during the hospital encounter of 11/15/24    Adult Transthoracic Echo Complete W/ Cont if Necessary Per Protocol    Interpretation Summary    Left ventricular systolic function is hyperdynamic (EF > 70%). Calculated left ventricular EF = 72.4%    Left ventricular wall thickness is consistent with mild to moderate concentric hypertrophy.    Left ventricular diastolic " function is consistent with (grade I) impaired relaxation.    The left atrial cavity is mild to moderately dilated.    Transthoracic echocardiography reveals mild to moderate LVH with EF of 70%  The anterior leaflet of mitral valve is thickened particularly at the tips with calcification and the differential diagnosis can be an old vegetation which is calcified versus calcific disease of the mitral valve and differential diagnosis can also be for rheumatic disease and mild to moderate eccentric MR directed posteriorly and laterally.  No effusion    Electronically signed by Tory Leger MD, 11/17/24, 3:17 PM EST.        Patient Assessment:   C/O laying in bed, resp even and unlabored. Noted trace edema bilateral legs with intact ace wraps.     Current O2:    Home O2:      Education provided to patient:  yes- Heart Failure disease education  yes -Symptom identification/management  yes -Daily Weights  yes- Diet education   - Fluid restriction (if ordered)  yes- Activity education  yes- Medication education   - Smoking cessation  yes- Follow-up Appointments  yes-Provided information on how to access [facility] My HF Guide/Heart Failure Interactive workbook    Acceptance of learning: acceptance, cooperative    Heart Failure education interactive teaching session time: 30 minutes    GWTG: EF 72%    Identified needs/barriers:   Volume status, needs PCP, mobility - pending Maria Guadalupe Jacobo, Needs follow-up cardiology and PCP    Intervention:   education

## 2024-11-21 NOTE — PROGRESS NOTES
Referring Provider: Latia Amanda,*    Reason for follow-up: HFpEF     Patient Care Team:  Provider, No Known as PCP - Shane Ramírez MD as Cardiologist (Cardiology)      SUBJECTIVE  Resting comfortably in bed.  Denies any shortness of breath, chest pain     ROS  Review of all systems negative except as indicated.    Since I have last seen, the patient has been without any chest discomfort, shortness of breath, palpitations, dizziness or syncope.  Denies having any headache, abdominal pain, nausea, vomiting, diarrhea, constipation, loss of weight or loss of appetite.  Denies having any excessive bruising, hematuria or blood in the stool.        Personal History:    Past Medical History:   Diagnosis Date    Anxiety and depression     Hypertension        History reviewed. No pertinent surgical history.    Family History   Family history unknown: Yes       Social History     Tobacco Use    Smoking status: Never    Smokeless tobacco: Never   Vaping Use    Vaping status: Never Used   Substance Use Topics    Alcohol use: Never    Drug use: Never        Home meds:  Prior to Admission medications    Medication Sig Start Date End Date Taking? Authorizing Provider   VITAMIN D, CHOLECALCIFEROL, PO Take 1 capsule by mouth Daily.   Yes Provider, MD Rosemary   buPROPion XL (WELLBUTRIN XL) 150 MG 24 hr tablet TAKE ONE TABLET BY MOUTH DAILY  Patient not taking: Reported on 11/15/2024 6/22/22   Juan Harding MD   lisinopril-hydrochlorothiazide (PRINZIDE,ZESTORETIC) 20-12.5 MG per tablet TAKE ONE TABLET BY MOUTH DAILY  Patient not taking: Reported on 11/15/2024 7/27/22   Juan Harding MD   vitamin D (ERGOCALCIFEROL) 1.25 MG (63326 UT) capsule capsule TAKE ONE CAPSULE BY MOUTH ONCE WEEKLY  Patient not taking: Reported on 11/15/2024 2/20/20   Valentina Andrade MD       Allergies:  Patient has no known allergies.    Scheduled Meds:amLODIPine, 5 mg, Oral, Q24H  carvedilol, 6.25 mg, Oral, BID With  "Meals  cefepime, 2,000 mg, Intravenous, Q12H  [Held by provider] empagliflozin, 10 mg, Oral, Daily  heparin (porcine), 5,000 Units, Subcutaneous, Q12H  iopamidol, 100 mL, Intravenous, Once in imaging  [Held by provider] losartan, 50 mg, Oral, Q24H  QUEtiapine, 25 mg, Oral, Nightly  sodium chloride, 10 mL, Intravenous, Q12H      Continuous Infusions:Pharmacy to Dose Cefepime,       PRN Meds:.  acetaminophen **OR** acetaminophen **OR** acetaminophen    aluminum-magnesium hydroxide-simethicone    senna-docusate sodium **AND** polyethylene glycol **AND** bisacodyl **AND** bisacodyl    Calcium Replacement - Follow Nurse / BPA Driven Protocol    dextrose    hydrALAZINE    Magnesium Cardiology Dose Replacement - Follow Nurse / BPA Driven Protocol    melatonin    nitroglycerin    ondansetron ODT **OR** ondansetron    Pharmacy to Dose Cefepime    Phosphorus Replacement - Follow Nurse / BPA Driven Protocol    Potassium Replacement - Follow Nurse / BPA Driven Protocol    [COMPLETED] Insert Peripheral IV **AND** sodium chloride    sodium chloride    sodium chloride    traZODone      OBJECTIVE    Vital Signs  Vitals:    11/20/24 2012 11/20/24 2338 11/21/24 0000 11/21/24 0356   BP: 134/73 (!) 185/96 135/81 130/68   BP Location: Right arm Right arm  Right arm   Patient Position: Lying Lying  Lying   Pulse: 72 80 70 71   Resp: 13 16  12   Temp: 98.2 °F (36.8 °C) 98 °F (36.7 °C)  98.3 °F (36.8 °C)   TempSrc: Oral Oral  Oral   SpO2: 92% 96% 94% 97%   Weight:       Height:           Flowsheet Rows      Flowsheet Row First Filed Value   Admission Height 172.7 cm (68\") Documented at 11/15/2024 1134   Admission Weight 93.8 kg (206 lb 12.7 oz) Documented at 11/15/2024 1133              Intake/Output Summary (Last 24 hours) at 11/21/2024 0638  Last data filed at 11/20/2024 0806  Gross per 24 hour   Intake 360 ml   Output --   Net 360 ml          Telemetry: Sinus rhythm    Physical Exam:  The patient is alert, oriented and in no " distress.  Vital signs as noted above.  Head and neck revealed no carotid bruits or jugular venous distention.  No thyromegaly or lymphadenopathy is present  Lungs clear.  No wheezing.  Breath sounds are normal bilaterally.  Heart normal first and second heart sounds.  No murmur. No precordial rub is present.  No gallop is present.  Abdomen soft and nontender.  No organomegaly is present.  Extremities bilateral lower extremity wrapped in Ace wrap.  Skin warm and dry.  Musculoskeletal system is grossly normal.  CNS grossly normal.       Results Review:  I have personally reviewed the results from the time of this admission to 11/21/2024 06:38 EST and agree with these findings:  []  Laboratory  []  Microbiology  []  Radiology  []  EKG/Telemetry   []  Cardiology/Vascular   []  Pathology  []  Old records  []  Other:    Most notable findings include:    Lab Results (last 24 hours)       Procedure Component Value Units Date/Time    Basic Metabolic Panel [[id number]]  (Abnormal) Collected: 11/21/24 0400    Specimen: Blood from Arm, Left Updated: 11/21/24 0530     Glucose 103 mg/dL      BUN 42 mg/dL      Creatinine 1.42 mg/dL      Sodium 137 mmol/L      Potassium 4.3 mmol/L      Chloride 100 mmol/L      CO2 26.8 mmol/L      Calcium 9.4 mg/dL      BUN/Creatinine Ratio 29.6     Anion Gap 10.2 mmol/L      eGFR 51.2 mL/min/1.73     Narrative:      GFR Normal >60  Chronic Kidney Disease <60  Kidney Failure <15    The GFR formula is only valid for adults with stable renal function between ages 18 and 70.    CBC & Differential [[id number]]  (Abnormal) Collected: 11/21/24 0400    Specimen: Blood from Arm, Left Updated: 11/21/24 0448    Narrative:      The following orders were created for panel order CBC & Differential.  Procedure                               Abnormality         Status                     ---------                               -----------         ------                     CBC Auto Differential[[id number]]         Abnormal            Final result                 Please view results for these tests on the individual orders.    CBC Auto Differential [[id number]]  (Abnormal) Collected: 11/21/24 0400    Specimen: Blood from Arm, Left Updated: 11/21/24 0448     WBC 10.07 10*3/mm3      RBC 4.62 10*6/mm3      Hemoglobin 14.3 g/dL      Hematocrit 43.7 %      MCV 94.6 fL      MCH 31.0 pg      MCHC 32.7 g/dL      RDW 12.5 %      RDW-SD 43.8 fl      MPV 9.0 fL      Platelets 445 10*3/mm3      Neutrophil % 74.5 %      Lymphocyte % 11.0 %      Monocyte % 7.1 %      Eosinophil % 2.9 %      Basophil % 0.6 %      Immature Grans % 3.9 %      Neutrophils, Absolute 7.51 10*3/mm3      Lymphocytes, Absolute 1.11 10*3/mm3      Monocytes, Absolute 0.71 10*3/mm3      Eosinophils, Absolute 0.29 10*3/mm3      Basophils, Absolute 0.06 10*3/mm3      Immature Grans, Absolute 0.39 10*3/mm3      nRBC 0.0 /100 WBC     Potassium [[id number]]  (Normal) Collected: 11/20/24 1640    Specimen: Blood from Arm, Right Updated: 11/20/24 1723     Potassium 4.1 mmol/L     Creatinine Urine Random (kidney function) GFR component - Urine, Clean Catch [[id number]] Collected: 11/20/24 1025    Specimen: Urine, Clean Catch Updated: 11/20/24 1614     Creatinine, Urine 113.7 mg/dL     Narrative:      Reference intervals for random urine have not been established.  Clinical usage is dependent upon physician's interpretation in combination with other laboratory tests.       POC Glucose Once [[id number]]  (Abnormal) Collected: 11/20/24 1510    Specimen: Blood Updated: 11/20/24 1512     Glucose 108 mg/dL      Comment: Serial Number: [id number]:  [id number]       Blood Culture - Blood, Arm, Right [[id number]]  (Normal) Collected: 11/15/24 1255    Specimen: Blood from Arm, Right Updated: 11/20/24 1300     Blood Culture No growth at 5 days    Blood Culture - Blood, Arm, Left [[id number]]  (Normal) Collected: 11/15/24 1232    Specimen: Blood from Arm, Left Updated: 11/20/24 1245      Blood Culture No growth at 5 days    POC Glucose Once [[id number]]  (Abnormal) Collected: 11/20/24 1204    Specimen: Blood Updated: 11/20/24 1205     Glucose 68 mg/dL      Comment: Serial Number: [id number]:  [id number]       Sodium, Urine, Random - Urine, Clean Catch [[id number]] Collected: 11/20/24 1025    Specimen: Urine, Clean Catch Updated: 11/20/24 1104     Sodium, Urine 99 mmol/L     Narrative:      Reference intervals for random urine have not been established.  Clinical usage is dependent upon physician's interpretation in combination with other laboratory tests.       Urinalysis With Microscopic If Indicated (No Culture) - Urine, Clean Catch [[id number]]  (Abnormal) Collected: 11/20/24 1025    Specimen: Urine, Clean Catch Updated: 11/20/24 1042     Color, UA Yellow     Appearance, UA Clear     pH, UA <=5.0     Specific Gravity, UA 1.020     Glucose,  mg/dL (2+)     Ketones, UA Trace     Bilirubin, UA Negative     Blood, UA Negative     Protein, UA Trace     Leuk Esterase, UA Negative     Nitrite, UA Negative     Urobilinogen, UA 0.2 E.U./dL    Narrative:      Urine microscopic not indicated.    POC Glucose Once [[id number]]  (Abnormal) Collected: 11/20/24 0850    Specimen: Blood Updated: 11/20/24 0852     Glucose 69 mg/dL      Comment: Serial Number: [id number]:  [id number]       POC Glucose Once [[id number]]  (Abnormal) Collected: 11/20/24 0739    Specimen: Blood Updated: 11/20/24 0742     Glucose 60 mg/dL      Comment: Serial Number: [id number]:  [id number]               Imaging Results (Last 24 Hours)       ** No results found for the last 24 hours. **            LAB RESULTS (LAST 7 DAYS)    CBC  Results from last 7 days   Lab Units 11/21/24  0400 11/20/24  0546 11/18/24  2347 11/18/24  0203 11/17/24  0040 11/16/24  0006 11/15/24  1208   WBC 10*3/mm3 10.07 8.66 8.27 8.63 12.93* 10.11 11.74*   RBC 10*6/mm3 4.62 4.90 4.27 3.94* 4.05* 3.92* 4.35   HEMOGLOBIN g/dL 14.3 14.9 13.4 12.2*  12.6* 12.0* 13.5   HEMATOCRIT % 43.7 47.7 39.7 37.1* 38.1 37.4* 41.2   MCV fL 94.6 97.3* 93.0 94.2 94.1 95.4 94.7   PLATELETS 10*3/mm3 445 353 348 307 325 318 352       BMP  Results from last 7 days   Lab Units 11/21/24 0400 11/20/24  1640 11/20/24 0546 11/18/24 2347 11/18/24 0203 11/17/24 0040 11/16/24  0006 11/15/24  1208   SODIUM mmol/L 137  --  137 136 138 132* 136 138   POTASSIUM mmol/L 4.3 4.1 3.6 3.6 3.8 4.0 4.0 4.1   CHLORIDE mmol/L 100  --  95* 99 104 100 101 101   CO2 mmol/L 26.8  --  30.4* 26.5 24.8 21.9* 26.3 27.9   BUN mg/dL 42*  --  27* 18 15 16 20 24*   CREATININE mg/dL 1.42*  --  1.37* 1.14 0.91 0.93 1.03 1.12   GLUCOSE mg/dL 103*  --  87 126* 87 103* 97 87   MAGNESIUM mg/dL  --   --   --   --  2.3 2.2 2.2  --    PHOSPHORUS mg/dL  --   --   --   --  3.1 2.4* 2.7  --        CMP   Results from last 7 days   Lab Units 11/21/24 0400 11/20/24  1640 11/20/24 0546 11/18/24 2347 11/18/24 0203 11/17/24  1006 11/17/24 0040 11/16/24  0006 11/15/24  1208   SODIUM mmol/L 137  --  137 136 138  --  132* 136 138   POTASSIUM mmol/L 4.3 4.1 3.6 3.6 3.8  --  4.0 4.0 4.1   CHLORIDE mmol/L 100  --  95* 99 104  --  100 101 101   CO2 mmol/L 26.8  --  30.4* 26.5 24.8  --  21.9* 26.3 27.9   BUN mg/dL 42*  --  27* 18 15  --  16 20 24*   CREATININE mg/dL 1.42*  --  1.37* 1.14 0.91  --  0.93 1.03 1.12   GLUCOSE mg/dL 103*  --  87 126* 87  --  103* 97 87   ALBUMIN g/dL  --   --   --   --   --   --   --   --  3.8   BILIRUBIN mg/dL  --   --   --   --   --   --   --   --  0.4   ALK PHOS U/L  --   --   --   --   --   --   --   --  98   AST (SGOT) U/L  --   --   --   --   --   --   --   --  26   ALT (SGPT) U/L  --   --   --   --   --   --   --   --  22   AMMONIA umol/L  --   --   --   --   --  17  --   --   --        BNP        TROPONIN  Results from last 7 days   Lab Units 11/16/24  1749   CK TOTAL U/L 237*       CoAg        Creatinine Clearance  Estimated Creatinine Clearance: 48 mL/min (A) (by C-G formula based on SCr of  1.42 mg/dL (H)).    ABG        Radiology  No radiology results for the last day      EKG  I personally viewed and interpreted the patient's EKG/Telemetry data:  ECG 12 Lead QT Measurement   Final Result   HEART RATE=89  bpm   RR Flhwdqyp=834  ms   NC Smafakjm=843  ms   P Horizontal Axis=-4  deg   P Front Axis=56  deg   QRSD Interval=90  ms   QT Fmbbzfdh=222  ms   UJpF=830  ms   QRS Axis=40  deg   T Wave Axis=60  deg   - ABNORMAL ECG -   Sinus rhythm   Probable  left atrial enlargement   Nonspecific  T abnormalities, lateral leads   When compared with ECG of 16-Nov-2024 20:38:51,   Significant axis, voltage or hypertrophy change   Electronically Signed By: Shane Mack ([facility]) 2024-11-18 10:15:07   Date and Time of Study:2024-11-18 06:34:06      ECG 12 Lead Tachycardia   Final Result   HEART RATE=97  bpm   RR Wabhrkfg=675  ms   NC Icmknfhh=312  ms   P Horizontal Axis=18  deg   P Front Axis=64  deg   QRSD Interval=89  ms   QT Izdtlxkz=850  ms   RUoA=315  ms   QRS Axis=29  deg   T Wave Axis=73  deg   - ABNORMAL ECG -   Sinus rhythm   Multiple ventricular premature complexes   Consider  left ventricular hypertrophy   Nonspecific  T abnormalities, lateral leads   NO PRIOR TRACING AVAILABLE FOR COMPARISON   Electronically Signed By: Troy Leger ([facility]) 2024-11-17 15:35:19   Date and Time of Study:2024-11-16 20:38:51      Telemetry Scan   Final Result      Telemetry Scan   Final Result      Telemetry Scan   Final Result      Telemetry Scan   Final Result      Telemetry Scan   Final Result      Telemetry Scan   Final Result      Telemetry Scan   Final Result      Telemetry Scan   Final Result      Telemetry Scan   Final Result      Telemetry Scan   Final Result            Echocardiogram:    Results for orders placed during the hospital encounter of 11/15/24    Adult Transthoracic Echo Complete W/ Cont if Necessary Per Protocol    Interpretation Summary    Left ventricular systolic function is hyperdynamic (EF > 70%).  Calculated left ventricular EF = 72.4%    Left ventricular wall thickness is consistent with mild to moderate concentric hypertrophy.    Left ventricular diastolic function is consistent with (grade I) impaired relaxation.    The left atrial cavity is mild to moderately dilated.    Transthoracic echocardiography reveals mild to moderate LVH with EF of 70%  The anterior leaflet of mitral valve is thickened particularly at the tips with calcification and the differential diagnosis can be an old vegetation which is calcified versus calcific disease of the mitral valve and differential diagnosis can also be for rheumatic disease and mild to moderate eccentric MR directed posteriorly and laterally.  No effusion    Electronically signed by Troy Leger MD, 11/17/24, 3:17 PM EST.        Stress Test:         Cardiac Catheterization:  No results found for this or any previous visit.         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  Grade 1 diastolic dysfunction.  HFpEF is likely secondary to hypertension, mitral regurgitation, obesity  proBNP more than 2000.  Overdiuresis on IV Lasix  Lasix have been held.  Losartan and Jardiance have been held.  Continue Coreg  Discussed elective stress test or coronary CTA outpatient due to multiple risk factors for coronary disease:    KELLY  Creatinine 1.4, GFR is 51.2  Stopped furosemide and hydrochlorothiazide  Stopped losartan and Jardiance  Will resume in the next 24 hours     Hypertension  Continue   Coreg  Resume losartan once renal function improves  Blood pressure and heart rate are well-controlled     Cellulitis  Currently on IV antibiotics.     Anxiety/depression.  Patient is on quetiapine  QTc is 429 ms     Obesity  BMI is 27.9.  Patient weighs 189 pounds.  Lifestyle modifications discussed with the patient.  Dietary changes and exercise discussed with the patient.  Screening and treatment for sleep apnea recommended      Shane Mack MD  11/21/24  06:38 EST

## 2024-11-21 NOTE — PLAN OF CARE
Goal Outcome Evaluation:              Outcome Evaluation: Patient is alert and oriented on room air. No complaints of pain. On iv fluids and abx. Dressing changed. Plan to d/c to [facility] when medically ready

## 2024-11-22 LAB
ALBUMIN SERPL-MCNC: 3.4 G/DL (ref 3.5–5.2)
ALBUMIN/GLOB SERPL: 1.1 G/DL
ALP SERPL-CCNC: 88 U/L (ref 39–117)
ALT SERPL W P-5'-P-CCNC: 64 U/L (ref 1–41)
ANION GAP SERPL CALCULATED.3IONS-SCNC: 8 MMOL/L (ref 5–15)
AST SERPL-CCNC: 49 U/L (ref 1–40)
BILIRUB SERPL-MCNC: 0.2 MG/DL (ref 0–1.2)
BUN SERPL-MCNC: 26 MG/DL (ref 8–23)
BUN/CREAT SERPL: 22.4 (ref 7–25)
CALCIUM SPEC-SCNC: 9.1 MG/DL (ref 8.6–10.5)
CHLORIDE SERPL-SCNC: 103 MMOL/L (ref 98–107)
CO2 SERPL-SCNC: 28 MMOL/L (ref 22–29)
CREAT SERPL-MCNC: 1.16 MG/DL (ref 0.76–1.27)
DEPRECATED RDW RBC AUTO: 44.7 FL (ref 37–54)
EGFRCR SERPLBLD CKD-EPI 2021: 65.3 ML/MIN/1.73
ERYTHROCYTE [DISTWIDTH] IN BLOOD BY AUTOMATED COUNT: 12.5 % (ref 12.3–15.4)
GLOBULIN UR ELPH-MCNC: 3.2 GM/DL
GLUCOSE SERPL-MCNC: 71 MG/DL (ref 65–99)
HCT VFR BLD AUTO: 42 % (ref 37.5–51)
HGB BLD-MCNC: 13.6 G/DL (ref 13–17.7)
MCH RBC QN AUTO: 31.1 PG (ref 26.6–33)
MCHC RBC AUTO-ENTMCNC: 32.4 G/DL (ref 31.5–35.7)
MCV RBC AUTO: 96.1 FL (ref 79–97)
PLATELET # BLD AUTO: 364 10*3/MM3 (ref 140–450)
PMV BLD AUTO: 8.8 FL (ref 6–12)
POTASSIUM SERPL-SCNC: 3.9 MMOL/L (ref 3.5–5.2)
PROT SERPL-MCNC: 6.6 G/DL (ref 6–8.5)
RBC # BLD AUTO: 4.37 10*6/MM3 (ref 4.14–5.8)
SODIUM SERPL-SCNC: 139 MMOL/L (ref 136–145)
WBC NRBC COR # BLD AUTO: 10.13 10*3/MM3 (ref 3.4–10.8)

## 2024-11-22 PROCEDURE — 97535 SELF CARE MNGMENT TRAINING: CPT

## 2024-11-22 PROCEDURE — 97530 THERAPEUTIC ACTIVITIES: CPT

## 2024-11-22 PROCEDURE — 25010000002 CEFEPIME PER 500 MG

## 2024-11-22 PROCEDURE — 97116 GAIT TRAINING THERAPY: CPT

## 2024-11-22 PROCEDURE — 97110 THERAPEUTIC EXERCISES: CPT

## 2024-11-22 PROCEDURE — 25010000002 HEPARIN (PORCINE) PER 1000 UNITS: Performed by: INTERNAL MEDICINE

## 2024-11-22 PROCEDURE — 80053 COMPREHEN METABOLIC PANEL: CPT

## 2024-11-22 PROCEDURE — 99232 SBSQ HOSP IP/OBS MODERATE 35: CPT | Performed by: INTERNAL MEDICINE

## 2024-11-22 PROCEDURE — 85027 COMPLETE CBC AUTOMATED: CPT

## 2024-11-22 PROCEDURE — 97112 NEUROMUSCULAR REEDUCATION: CPT

## 2024-11-22 RX ORDER — LOSARTAN POTASSIUM 25 MG/1
25 TABLET ORAL
Status: DISCONTINUED | OUTPATIENT
Start: 2024-11-22 | End: 2024-11-23 | Stop reason: HOSPADM

## 2024-11-22 RX ADMIN — CEFEPIME 2000 MG: 2 INJECTION, POWDER, FOR SOLUTION INTRAVENOUS at 05:06

## 2024-11-22 RX ADMIN — HEPARIN SODIUM 5000 UNITS: 5000 INJECTION INTRAVENOUS; SUBCUTANEOUS at 10:12

## 2024-11-22 RX ADMIN — Medication 10 ML: at 20:35

## 2024-11-22 RX ADMIN — HEPARIN SODIUM 5000 UNITS: 5000 INJECTION INTRAVENOUS; SUBCUTANEOUS at 20:34

## 2024-11-22 RX ADMIN — CEFEPIME 2000 MG: 2 INJECTION, POWDER, FOR SOLUTION INTRAVENOUS at 16:01

## 2024-11-22 RX ADMIN — LOSARTAN POTASSIUM 25 MG: 25 TABLET, FILM COATED ORAL at 16:00

## 2024-11-22 RX ADMIN — CARVEDILOL 6.25 MG: 6.25 TABLET, FILM COATED ORAL at 10:10

## 2024-11-22 RX ADMIN — QUETIAPINE FUMARATE 25 MG: 25 TABLET ORAL at 20:35

## 2024-11-22 RX ADMIN — Medication 10 ML: at 10:12

## 2024-11-22 RX ADMIN — CARVEDILOL 6.25 MG: 6.25 TABLET, FILM COATED ORAL at 16:00

## 2024-11-22 RX ADMIN — AMLODIPINE BESYLATE 5 MG: 5 TABLET ORAL at 10:12

## 2024-11-22 NOTE — CASE MANAGEMENT/SOCIAL WORK
Continued Stay Note  LEIGHTON Fowler     Patient Name: Roger Nguyen  MRN: [id number]  Today's Date: 11/22/2024    Admit Date: 11/15/2024    Plan: [facility] accepted. Precert approved 11/20-11/26. PASRR approved.   Discharge Plan       Row Name 11/22/24 3311       Plan    Plan Comments Losartan restarted today per hospitalist. Plan to recheck labs tomorrow and okay to discharge if kidney function improved.             Megan Naegele, RN     Office Phone: [phone]  Office Cell: [phone]

## 2024-11-22 NOTE — THERAPY TREATMENT NOTE
"Subjective: Pt agreeable to therapeutic plan of care.    Objective:     Bed mobility - N/A or Not attempted.    Transfers - STS SBA-CGA using RW. Multiple standing trials performed, with max cueing on hand placement, positioning.     Balance - Supported standing balance CGA-min A    Ambulation - 80 feet + 30 feet CGA and with rolling walker    Therapeutic Exercise - 15 Reps B LE AROM standing    Vitals: WNL    Pain: 0 VAS   Location:   Intervention for pain: N/A    Education: Provided education on the importance of mobility in the acute care setting, Verbal/Tactile Cues, Transfer Training, and Gait Training    Assessment: Roger Nguyen presents with functional mobility impairments which indicate the need for skilled intervention. Tolerating session today without incident. Pt able to demo improved mechanics with functional transfers and demonstrates improvement in safety awareness while upright. Continues to be easily distracted at times. Will continue to follow and progress as tolerated.     Plan/Recommendations:   If medically appropriate, Moderate Intensity Therapy recommended post-acute care. This is recommended as therapy feels the patient would require 3-4 days per week and wouldn't tolerate \"3 hour daily\" rehab intensity. SNF would be the preferred choice. If the patient does not agree to SNF, arrange HH or OP depending on home bound status. If patient is medically complex, consider LTACH. Pt requires no DME at discharge.     Pt desires Skilled Rehab placement at discharge. Pt cooperative; agreeable to therapeutic recommendations and plan of care.         Basic Mobility 6-click:  Rollin = Total, A lot = 2, A little = 3; 4 = None  Supine>Sit:   1 = Total, A lot = 2, A little = 3; 4 = None   Sit>Stand with arms:  1 = Total, A lot = 2, A little = 3; 4 = None  Bed>Chair:   1 = Total, A lot = 2, A little = 3; 4 = None  Ambulate in room:  1 = Total, A lot = 2, A little = 3; 4 = None  3-5 Steps with " railin = Total, A lot = 2, A little = 3; 4 = None  Score: 17    Modified [place]: N/A = No pre-op stroke/TIA    Post-Tx Position: Up in Chair, Alarms activated, and Call light and personal items within reach  PPE: gloves    Therapy Charges for Today       Code Description Service Date Service Provider Modifiers Qty    37226550619 HC GAIT TRAINING EA 15 MIN 2024 Maged Zuniga, PTA GP 1    14334707260 HC PT THERAPEUTIC ACT EA 15 MIN 2024 Maged uZniga, PTA GP 1    40705909798 HC PT NEUROMUSC RE EDUCATION EA 15 MIN 2024 Maged Zuniga, PTA GP 1    51258670791 HC PT THER PROC EA 15 MIN 2024 Maged Zuniga, PTA GP 1    65566818725 HC GAIT TRAINING EA 15 MIN 2024 Maged Zuniga, PTA GP 1    90665791235 HC PT THERAPEUTIC ACT EA 15 MIN 2024 Maged Zuniga, PTA GP 1    93640579380 HC PT NEUROMUSC RE EDUCATION EA 15 MIN 2024 Maged Zuniga, PTA GP 1    96281459509 HC PT THER PROC EA 15 MIN 2024 Maged Zuniga, PTA GP 1           PT Charges       Row Name 24 1152             Time Calculation    Start Time 1001  -UN      Stop Time 1033  -UN      Time Calculation (min) 32 min  -UN      PT Received On 24  -UN      PT - Next Appointment 24  -UN         Time Calculation- PT    Total Timed Code Minutes- PT 32 minute(s)  -UN                User Key  (r) = Recorded By, (t) = Taken By, (c) = Cosigned By      Initials Name Provider Type    Maged Cueto PTA Physical Therapist Assistant

## 2024-11-22 NOTE — PROGRESS NOTES
[facility] MEDICINE SERVICE  DAILY PROGRESS NOTE    NAME: Roger Nguyen  : 1950  MRN: [id number]      LOS: 7 days     PROVIDER OF SERVICE: Latia Amanda MD    Chief Complaint: Cellulitis    Subjective:     Interval History:  History taken from: patient    Patient seen and examined at bedside this morning.  No acute complaints overnight        Review of Systems: Negative except as described above  Review of Systems    Objective:     Vital Signs  Temp:  [97.7 °F (36.5 °C)-98.5 °F (36.9 °C)] 98.1 °F (36.7 °C)  Heart Rate:  [67-79] 76  Resp:  [15-19] 15  BP: (123-152)/(58-77) 133/77   Body mass index is 28.47 kg/m².    Physical Exam  Physical Exam  Constitutional:       Comments: NAD    Cardiovascular:      Comments:  RRR, S1 & S2   Pulmonary:      Comments:  Lungs CTA   Abdominal:      Comments:  ABD soft, NT            Diagnostic Data    Results from last 7 days   Lab Units 24  1010   WBC 10*3/mm3 10.13   HEMOGLOBIN g/dL 13.6   HEMATOCRIT % 42.0   PLATELETS 10*3/mm3 364   GLUCOSE mg/dL 71   CREATININE mg/dL 1.16   BUN mg/dL 26*   SODIUM mmol/L 139   POTASSIUM mmol/L 3.9   AST (SGOT) U/L 49*   ALT (SGPT) U/L 64*   ALK PHOS U/L 88   BILIRUBIN mg/dL 0.2   ANION GAP mmol/L 8.0       No radiology results for the last day      I reviewed the patient's new clinical results.    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       #Bilateral lower extremity swelling due to Diastolic CHF  -Patient presented with worsened bilateral lower extremity swelling pain and redness.  Per the patient the bilateral lower extremity swelling has been ongoing for a few years and it gradually worsened.  -CRP elevated however Pro-Vahe WNL  -MRSA negative  -TSH WNL  - TTE showed diastolic dysfunction  -Bilateral venous Doppler negative for DVT  -CT lower extremity with contrast shows subcutaneous soft tissue edema in bilateral lower extremities.    -Wound culture came back positive for Pseudomonas so we will switch Rocephin to cefepime  -wound care consulted  -PT OT to see      Diastolic CHF, new diagnosis  -CXR showed cardiomegaly with central pulmonary vascular congestion  - BNP elevated, patient is currently breathing comfortably on room air  -Will resume patient on p.o. diuretic we will go ahead and restart losartan as KELLY resolved  -TTE reviewed  -Cardiology consulted, states that will consider elective stress test versus coronary CTA outpatient  -Recommend starting on SGLT2 inhibitor prior to discharge  -Will send in referral to heart failure clinic upon discharge     KELLY likely due to diuresis, resolved  -UA, urine sodium, urine creatinine reviewed  -Avoid nephrotoxic medication  -Continue to monitor renal function        Hematuria  -Blood seen on 2 different UAs  Urology consulted, ordered CT scan hematuria protocol and state will do a bedside cystoscopy on Monday  Urine culture Show no growth to date     #Hypertension  -Restart losartan on lower dose, start low-dose amlodipine hydralazine as needed     Onychomycosis  Podiatry consulted, will have him follow-up outpatient     #Social issue  -Will need PCP.  Will need blood pressure medication prescription on discharge.    VTE Prophylaxis:  Pharmacologic VTE prophylaxis orders are present.                  Time: 35 minutes     There are no questions and answers to display.        Signature: Electronically signed by Latia Amanda MD, 11/22/24, 11:06 EST.  [facility]

## 2024-11-22 NOTE — PLAN OF CARE
"Assessment: Roger Nguyen presents with ADL impairments affecting function including balance, endurance / activity tolerance, and strength. Pt still requiring Min A-CGA with functional mobility, and verbal cues for task segmentation when ambulating in tight spaces. Improved activity tolerance noted. Demonstrated functioning below baseline abilities indicate the need for continued skilled intervention while inpatient. Tolerating session today without incident. Will continue to follow and progress as tolerated.      Plan/Recommendations:   Moderate Intensity Therapy recommended post-acute care. This is recommended as therapy feels the patient would require 3-4 days per week and wouldn't tolerate \"3 hour daily\" rehab intensity. SNF would be the preferred choice. If the patient does not agree to SNF, arrange HH or OP depending on home bound status. If patient is medically complex, consider LTACH.. Pt requires no DME at discharge.      Pt desires Skilled Rehab placement at discharge. Pt cooperative; agreeable to therapeutic recommendations and plan of care.     "

## 2024-11-22 NOTE — THERAPY TREATMENT NOTE
"Subjective: Pt agreeable to therapeutic plan of care.  Cognition: oriented to Person, Place, Time, and Situation    Objective:       Bed Mobility: N/A or Not attempted.   Functional Transfers: CGA, Min-A, and with rolling walker     Balance: supported, static, dynamic, and standing CGA, Min-A, and with rolling walker  Functional Ambulation: CGA, Min-A, and with rolling walker    Grooming: CGA with cues  ADL Position: supported standing and at sink  ADL Comments: Oral care and hair care      Therapeutic Exercise - 10 Reps B UE AROM supported sitting / chair    Vitals: WNL    Pain: 0 VAS  Location: na  Interventions for pain: N/A  Education: Provided education on the importance of mobility in the acute care setting, Verbal/Tactile Cues, ADL training, and Transfer Training      Assessment: Roger Nguyen presents with ADL impairments affecting function including balance, endurance / activity tolerance, and strength. Pt still requiring Min A-CGA with functional mobility, and verbal cues for task segmentation when ambulating in tight spaces. Improved activity tolerance noted. Demonstrated functioning below baseline abilities indicate the need for continued skilled intervention while inpatient. Tolerating session today without incident. Will continue to follow and progress as tolerated.     Plan/Recommendations:   Moderate Intensity Therapy recommended post-acute care. This is recommended as therapy feels the patient would require 3-4 days per week and wouldn't tolerate \"3 hour daily\" rehab intensity. SNF would be the preferred choice. If the patient does not agree to SNF, arrange HH or OP depending on home bound status. If patient is medically complex, consider LTACH.. Pt requires no DME at discharge.     Pt desires Skilled Rehab placement at discharge. Pt cooperative; agreeable to therapeutic recommendations and plan of care.     Modified [place]: N/A = No pre-op stroke/TIA    Post-Tx Position: Up in Chair, Alarms " activated, and Call light and personal items within reach  PPE: gloves    Therapy Charges for Today       Code Description Service Date Service Provider Modifiers Qty    67253373476 HC OT SELF CARE/MGMT/TRAIN EA 15 MIN 11/22/2024 Eric Herrera OT GO 1    57389191175 HC OT THERAPEUTIC ACT EA 15 MIN 11/22/2024 Eric Herrera OT GO 1    56584584361 HC OT THER PROC EA 15 MIN 11/22/2024 Eric Herrera OT GO 1           Time Calculation- OT       Row Name 11/22/24 1549             Time Calculation- OT    OT Start Time 1030  -      OT Stop Time 1057  -      OT Time Calculation (min) 27 min  -      Total Timed Code Minutes- OT 27 minute(s)  -      OT Received On 11/22/24  -      OT - Next Appointment 11/25/24  -                User Key  (r) = Recorded By, (t) = Taken By, (c) = Cosigned By      Initials Name Provider Type     Eric Herrera OT Occupational Therapist

## 2024-11-22 NOTE — PROGRESS NOTES
Referring Provider: Latia Amanda,*    Reason for follow-up: HFpEF     Patient Care Team:  Provider, No Known as PCP - Shane Ramírez MD as Cardiologist (Cardiology)      SUBJECTIVE  Resting comfortably in bed.  Denies any shortness of breath, chest pain     ROS  Review of all systems negative except as indicated.    Since I have last seen, the patient has been without any chest discomfort, shortness of breath, palpitations, dizziness or syncope.  Denies having any headache, abdominal pain, nausea, vomiting, diarrhea, constipation, loss of weight or loss of appetite.  Denies having any excessive bruising, hematuria or blood in the stool.        Personal History:    Past Medical History:   Diagnosis Date    Anxiety and depression     Hypertension        History reviewed. No pertinent surgical history.    Family History   Family history unknown: Yes       Social History     Tobacco Use    Smoking status: Never    Smokeless tobacco: Never   Vaping Use    Vaping status: Never Used   Substance Use Topics    Alcohol use: Never    Drug use: Never        Home meds:  Prior to Admission medications    Medication Sig Start Date End Date Taking? Authorizing Provider   VITAMIN D, CHOLECALCIFEROL, PO Take 1 capsule by mouth Daily.   Yes Provider, MD Rosemary   buPROPion XL (WELLBUTRIN XL) 150 MG 24 hr tablet TAKE ONE TABLET BY MOUTH DAILY  Patient not taking: Reported on 11/15/2024 6/22/22   Juan Harding MD   lisinopril-hydrochlorothiazide (PRINZIDE,ZESTORETIC) 20-12.5 MG per tablet TAKE ONE TABLET BY MOUTH DAILY  Patient not taking: Reported on 11/15/2024 7/27/22   Juan Harding MD   vitamin D (ERGOCALCIFEROL) 1.25 MG (10396 UT) capsule capsule TAKE ONE CAPSULE BY MOUTH ONCE WEEKLY  Patient not taking: Reported on 11/15/2024 2/20/20   Valentina Andrade MD       Allergies:  Patient has no known allergies.    Scheduled Meds:amLODIPine, 5 mg, Oral, Q24H  carvedilol, 6.25 mg, Oral, BID With  "Meals  cefepime, 2,000 mg, Intravenous, Q12H  [Held by provider] empagliflozin, 10 mg, Oral, Daily  heparin (porcine), 5,000 Units, Subcutaneous, Q12H  iopamidol, 100 mL, Intravenous, Once in imaging  [Held by provider] losartan, 50 mg, Oral, Q24H  QUEtiapine, 25 mg, Oral, Nightly  sodium chloride, 10 mL, Intravenous, Q12H      Continuous Infusions:Pharmacy to Dose Cefepime,       PRN Meds:.  acetaminophen **OR** acetaminophen **OR** acetaminophen    aluminum-magnesium hydroxide-simethicone    senna-docusate sodium **AND** polyethylene glycol **AND** bisacodyl **AND** bisacodyl    Calcium Replacement - Follow Nurse / BPA Driven Protocol    dextrose    hydrALAZINE    Magnesium Cardiology Dose Replacement - Follow Nurse / BPA Driven Protocol    melatonin    nitroglycerin    ondansetron ODT **OR** ondansetron    Pharmacy to Dose Cefepime    Phosphorus Replacement - Follow Nurse / BPA Driven Protocol    Potassium Replacement - Follow Nurse / BPA Driven Protocol    [COMPLETED] Insert Peripheral IV **AND** sodium chloride    sodium chloride    sodium chloride    traZODone      OBJECTIVE    Vital Signs  Vitals:    11/21/24 1602 11/21/24 2022 11/22/24 0006 11/22/24 0351   BP: 142/70 152/64 147/73 144/59   BP Location: Right arm Right arm Right arm Right arm   Patient Position: Lying Lying Lying Lying   Pulse: 73 67 73 76   Resp: 17 15 16 19   Temp: 98.5 °F (36.9 °C) 97.7 °F (36.5 °C) 97.9 °F (36.6 °C) 98.1 °F (36.7 °C)   TempSrc: Oral Oral Axillary Oral   SpO2: 100% 96% 97% 91%   Weight:       Height:           Flowsheet Rows      Flowsheet Row First Filed Value   Admission Height 172.7 cm (68\") Documented at 11/15/2024 1134   Admission Weight 93.8 kg (206 lb 12.7 oz) Documented at 11/15/2024 1133              Intake/Output Summary (Last 24 hours) at 11/22/2024 0654  Last data filed at 11/22/2024 0351  Gross per 24 hour   Intake --   Output 1151 ml   Net -1151 ml          Telemetry: Sinus rhythm    Physical Exam:  The " patient is alert, oriented and in no distress.  Vital signs as noted above.  Head and neck revealed no carotid bruits or jugular venous distention.  No thyromegaly or lymphadenopathy is present  Lungs clear.  No wheezing.  Breath sounds are normal bilaterally.  Heart normal first and second heart sounds.  No murmur. No precordial rub is present.  No gallop is present.  Abdomen soft and nontender.  No organomegaly is present.  Extremities bilateral lower extremity wrapped in Ace wrap.  Skin warm and dry.  Musculoskeletal system is grossly normal.  CNS grossly normal.       Results Review:  I have personally reviewed the results from the time of this admission to 11/22/2024 06:54 EST and agree with these findings:  []  Laboratory  []  Microbiology  []  Radiology  []  EKG/Telemetry   []  Cardiology/Vascular   []  Pathology  []  Old records  []  Other:    Most notable findings include:    Lab Results (last 24 hours)       ** No results found for the last 24 hours. **            Imaging Results (Last 24 Hours)       ** No results found for the last 24 hours. **            LAB RESULTS (LAST 7 DAYS)    CBC  Results from last 7 days   Lab Units 11/21/24  0400 11/20/24  0546 11/18/24 2347 11/18/24 0203 11/17/24  0040 11/16/24  0006 11/15/24  1208   WBC 10*3/mm3 10.07 8.66 8.27 8.63 12.93* 10.11 11.74*   RBC 10*6/mm3 4.62 4.90 4.27 3.94* 4.05* 3.92* 4.35   HEMOGLOBIN g/dL 14.3 14.9 13.4 12.2* 12.6* 12.0* 13.5   HEMATOCRIT % 43.7 47.7 39.7 37.1* 38.1 37.4* 41.2   MCV fL 94.6 97.3* 93.0 94.2 94.1 95.4 94.7   PLATELETS 10*3/mm3 445 353 348 307 325 318 352       BMP  Results from last 7 days   Lab Units 11/21/24  0400 11/20/24  1640 11/20/24  0546 11/18/24  2347 11/18/24  0203 11/17/24  0040 11/16/24  0006 11/15/24  1208   SODIUM mmol/L 137  --  137 136 138 132* 136 138   POTASSIUM mmol/L 4.3 4.1 3.6 3.6 3.8 4.0 4.0 4.1   CHLORIDE mmol/L 100  --  95* 99 104 100 101 101   CO2 mmol/L 26.8  --  30.4* 26.5 24.8 21.9* 26.3 27.9    BUN mg/dL 42*  --  27* 18 15 16 20 24*   CREATININE mg/dL 1.42*  --  1.37* 1.14 0.91 0.93 1.03 1.12   GLUCOSE mg/dL 103*  --  87 126* 87 103* 97 87   MAGNESIUM mg/dL  --   --   --   --  2.3 2.2 2.2  --    PHOSPHORUS mg/dL  --   --   --   --  3.1 2.4* 2.7  --        CMP   Results from last 7 days   Lab Units 11/21/24  0400 11/20/24  1640 11/20/24  0546 11/18/24  2347 11/18/24  0203 11/17/24  1006 11/17/24  0040 11/16/24  0006 11/15/24  1208   SODIUM mmol/L 137  --  137 136 138  --  132* 136 138   POTASSIUM mmol/L 4.3 4.1 3.6 3.6 3.8  --  4.0 4.0 4.1   CHLORIDE mmol/L 100  --  95* 99 104  --  100 101 101   CO2 mmol/L 26.8  --  30.4* 26.5 24.8  --  21.9* 26.3 27.9   BUN mg/dL 42*  --  27* 18 15  --  16 20 24*   CREATININE mg/dL 1.42*  --  1.37* 1.14 0.91  --  0.93 1.03 1.12   GLUCOSE mg/dL 103*  --  87 126* 87  --  103* 97 87   ALBUMIN g/dL  --   --   --   --   --   --   --   --  3.8   BILIRUBIN mg/dL  --   --   --   --   --   --   --   --  0.4   ALK PHOS U/L  --   --   --   --   --   --   --   --  98   AST (SGOT) U/L  --   --   --   --   --   --   --   --  26   ALT (SGPT) U/L  --   --   --   --   --   --   --   --  22   AMMONIA umol/L  --   --   --   --   --  17  --   --   --        BNP        TROPONIN  Results from last 7 days   Lab Units 11/16/24  1749   CK TOTAL U/L 237*       CoAg        Creatinine Clearance  Estimated Creatinine Clearance: 48.5 mL/min (A) (by C-G formula based on SCr of 1.42 mg/dL (H)).    ABG        Radiology  No radiology results for the last day      EKG  I personally viewed and interpreted the patient's EKG/Telemetry data:  ECG 12 Lead QT Measurement   Final Result   HEART RATE=89  bpm   RR Fbvdywer=815  ms   MO Julyinum=527  ms   P Horizontal Axis=-4  deg   P Front Axis=56  deg   QRSD Interval=90  ms   QT Wzjhgdyw=885  ms   EZoE=826  ms   QRS Axis=40  deg   T Wave Axis=60  deg   - ABNORMAL ECG -   Sinus rhythm   Probable  left atrial enlargement   Nonspecific  T abnormalities, lateral  leads   When compared with ECG of 16-Nov-2024 20:38:51,   Significant axis, voltage or hypertrophy change   Electronically Signed By: Shane Mack ([facility]) 2024-11-18 10:15:07   Date and Time of Study:2024-11-18 06:34:06      ECG 12 Lead Tachycardia   Final Result   HEART RATE=97  bpm   RR Xjgffpkt=947  ms   UT Iryekblx=871  ms   P Horizontal Axis=18  deg   P Front Axis=64  deg   QRSD Interval=89  ms   QT Bbbkgpni=842  ms   OGrD=110  ms   QRS Axis=29  deg   T Wave Axis=73  deg   - ABNORMAL ECG -   Sinus rhythm   Multiple ventricular premature complexes   Consider  left ventricular hypertrophy   Nonspecific  T abnormalities, lateral leads   NO PRIOR TRACING AVAILABLE FOR COMPARISON   Electronically Signed By: Troy Leger ([facility]) 2024-11-17 15:35:19   Date and Time of Study:2024-11-16 20:38:51      Telemetry Scan   Final Result      Telemetry Scan   Final Result      Telemetry Scan   Final Result      Telemetry Scan   Final Result      Telemetry Scan   Final Result      Telemetry Scan   Final Result      Telemetry Scan   Final Result      Telemetry Scan   Final Result      Telemetry Scan   Final Result      Telemetry Scan   Final Result      Telemetry Scan   Final Result      Telemetry Scan   Final Result      Telemetry Scan   Final Result      Telemetry Scan   Final Result      Telemetry Scan   Final Result      Telemetry Scan   Final Result      Telemetry Scan   Final Result      Telemetry Scan   Final Result      Telemetry Scan   Final Result            Echocardiogram:    Results for orders placed during the hospital encounter of 11/15/24    Adult Transthoracic Echo Complete W/ Cont if Necessary Per Protocol    Interpretation Summary    Left ventricular systolic function is hyperdynamic (EF > 70%). Calculated left ventricular EF = 72.4%    Left ventricular wall thickness is consistent with mild to moderate concentric hypertrophy.    Left ventricular diastolic function is consistent with (grade I) impaired  relaxation.    The left atrial cavity is mild to moderately dilated.    Transthoracic echocardiography reveals mild to moderate LVH with EF of 70%  The anterior leaflet of mitral valve is thickened particularly at the tips with calcification and the differential diagnosis can be an old vegetation which is calcified versus calcific disease of the mitral valve and differential diagnosis can also be for rheumatic disease and mild to moderate eccentric MR directed posteriorly and laterally.  No effusion    Electronically signed by Troy Leger MD, 11/17/24, 3:17 PM EST.        Stress Test:         Cardiac Catheterization:  No results found for this or any previous visit.         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  Grade 1 diastolic dysfunction.  HFpEF is likely secondary to hypertension, mitral regurgitation, obesity  proBNP more than 2000.  Overdiuresis on IV Lasix  Lasix, losartan and Jardiance held  Continue Coreg  Discussed need for elective stress test or coronary CTA outpatient due to multiple risk factors for coronary disease:    KELLY  Creatinine 1.4, GFR is 51.2  Lasix, hydrochlorothiazide, losartan and Jardiance have been held once renal function starts to improve  Will resume in the next 24 hours     Hypertension  Continue  Coreg  Resume losartan once renal function improves  Blood pressure and heart rate are well-controlled     Cellulitis  Currently on IV antibiotics.     Anxiety/depression.  Patient is on quetiapine  QTc is 429  ms     Obesity  BMI is 28.5.  Patient weighs 192 pounds.  Lifestyle modifications discussed with the patient.  Dietary changes and exercise discussed with the patient.  Screening and treatment for sleep apnea recommended      Shane Mack MD  11/22/24  06:54 EST

## 2024-11-22 NOTE — PLAN OF CARE
"Assessment: Roger Nguyen presents with functional mobility impairments which indicate the need for skilled intervention. Tolerating session today without incident. Pt able to demo improved mechanics with functional transfers and demonstrates improvement in safety awareness while upright. Continues to be easily distracted at times. Will continue to follow and progress as tolerated.     Plan/Recommendations:   If medically appropriate, Moderate Intensity Therapy recommended post-acute care. This is recommended as therapy feels the patient would require 3-4 days per week and wouldn't tolerate \"3 hour daily\" rehab intensity. SNF would be the preferred choice. If the patient does not agree to SNF, arrange HH or OP depending on home bound status. If patient is medically complex, consider LTACH. Pt requires no DME at discharge.     Pt desires Skilled Rehab placement at discharge. Pt cooperative; agreeable to therapeutic recommendations and plan of care.     "

## 2024-11-23 VITALS
TEMPERATURE: 97.2 F | RESPIRATION RATE: 11 BRPM | OXYGEN SATURATION: 94 % | HEIGHT: 69 IN | BODY MASS INDEX: 28.9 KG/M2 | WEIGHT: 195.11 LBS | HEART RATE: 71 BPM | DIASTOLIC BLOOD PRESSURE: 77 MMHG | SYSTOLIC BLOOD PRESSURE: 165 MMHG

## 2024-11-23 LAB
ALBUMIN SERPL-MCNC: 3.4 G/DL (ref 3.5–5.2)
ALBUMIN/GLOB SERPL: 1.2 G/DL
ALP SERPL-CCNC: 87 U/L (ref 39–117)
ALT SERPL W P-5'-P-CCNC: 60 U/L (ref 1–41)
ANION GAP SERPL CALCULATED.3IONS-SCNC: 9.9 MMOL/L (ref 5–15)
AST SERPL-CCNC: 55 U/L (ref 1–40)
BILIRUB SERPL-MCNC: 0.2 MG/DL (ref 0–1.2)
BUN SERPL-MCNC: 28 MG/DL (ref 8–23)
BUN/CREAT SERPL: 26.7 (ref 7–25)
CALCIUM SPEC-SCNC: 8.8 MG/DL (ref 8.6–10.5)
CHLORIDE SERPL-SCNC: 105 MMOL/L (ref 98–107)
CO2 SERPL-SCNC: 25.1 MMOL/L (ref 22–29)
CREAT SERPL-MCNC: 1.05 MG/DL (ref 0.76–1.27)
DEPRECATED RDW RBC AUTO: 43.5 FL (ref 37–54)
EGFRCR SERPLBLD CKD-EPI 2021: 73.6 ML/MIN/1.73
ERYTHROCYTE [DISTWIDTH] IN BLOOD BY AUTOMATED COUNT: 12.5 % (ref 12.3–15.4)
GLOBULIN UR ELPH-MCNC: 2.8 GM/DL
GLUCOSE SERPL-MCNC: 87 MG/DL (ref 65–99)
HCT VFR BLD AUTO: 40.6 % (ref 37.5–51)
HGB BLD-MCNC: 13.1 G/DL (ref 13–17.7)
MCH RBC QN AUTO: 30.3 PG (ref 26.6–33)
MCHC RBC AUTO-ENTMCNC: 32.3 G/DL (ref 31.5–35.7)
MCV RBC AUTO: 93.8 FL (ref 79–97)
PLATELET # BLD AUTO: 329 10*3/MM3 (ref 140–450)
PMV BLD AUTO: 9 FL (ref 6–12)
POTASSIUM SERPL-SCNC: 4.3 MMOL/L (ref 3.5–5.2)
PROT SERPL-MCNC: 6.2 G/DL (ref 6–8.5)
RBC # BLD AUTO: 4.33 10*6/MM3 (ref 4.14–5.8)
SODIUM SERPL-SCNC: 140 MMOL/L (ref 136–145)
WBC NRBC COR # BLD AUTO: 9.43 10*3/MM3 (ref 3.4–10.8)

## 2024-11-23 PROCEDURE — 25010000002 CEFEPIME PER 500 MG

## 2024-11-23 PROCEDURE — 25010000002 HEPARIN (PORCINE) PER 1000 UNITS: Performed by: INTERNAL MEDICINE

## 2024-11-23 PROCEDURE — 80053 COMPREHEN METABOLIC PANEL: CPT

## 2024-11-23 PROCEDURE — 85027 COMPLETE CBC AUTOMATED: CPT

## 2024-11-23 RX ORDER — AMLODIPINE BESYLATE 5 MG/1
5 TABLET ORAL
Qty: 30 TABLET | Refills: 1 | Status: SHIPPED | OUTPATIENT
Start: 2024-11-23 | End: 2025-01-22

## 2024-11-23 RX ORDER — LOSARTAN POTASSIUM 25 MG/1
50 TABLET ORAL
Qty: 60 TABLET | Refills: 1 | Status: SHIPPED | OUTPATIENT
Start: 2024-11-23 | End: 2025-01-22

## 2024-11-23 RX ADMIN — LOSARTAN POTASSIUM 25 MG: 25 TABLET, FILM COATED ORAL at 08:11

## 2024-11-23 RX ADMIN — CEFEPIME 2000 MG: 2 INJECTION, POWDER, FOR SOLUTION INTRAVENOUS at 04:42

## 2024-11-23 RX ADMIN — AMLODIPINE BESYLATE 5 MG: 5 TABLET ORAL at 08:11

## 2024-11-23 RX ADMIN — Medication 10 ML: at 08:12

## 2024-11-23 RX ADMIN — HEPARIN SODIUM 5000 UNITS: 5000 INJECTION INTRAVENOUS; SUBCUTANEOUS at 08:11

## 2024-11-23 RX ADMIN — CARVEDILOL 6.25 MG: 6.25 TABLET, FILM COATED ORAL at 08:11

## 2024-11-23 NOTE — PLAN OF CARE
Goal Outcome Evaluation:              Outcome Evaluation: Discharging per MD

## 2024-11-23 NOTE — DISCHARGE SUMMARY
"             [facility] Medicine Services  Discharge Summary    Date of Service: 2024  Patient Name: Roger Nguyen  : 1950  MRN: [id number]    Date of Admission: 11/15/2024  Discharge Diagnosis: Cellulitis  Date of Discharge: 2024  Primary Care Physician: Provider, No Known      Presenting Problem:   Cellulitis [L03.90]  Bilateral lower leg cellulitis [L03.116, L03.115]  Acute heart failure with preserved ejection fraction (HFpEF) [I50.31]    Active and Resolved Hospital Problems:  Active Hospital Problems    Diagnosis POA    **Cellulitis [L03.90] Yes    Acute heart failure with preserved ejection fraction (HFpEF) [I50.31] Yes    Primary hypertension [I10] Yes    Obesity (BMI 30-39.9) [E66.9] Yes    KELLY (acute kidney injury) [N17.9] No    Anxiety and depression [F41.9, F32.A] Yes    Mitral regurgitation [I34.0] Yes    Non-pressure chronic ulcer right ankle, 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         Hospital Course     HPI:    \"An extremely pleasant 74 y.o. old male patient with PMH of anxiety hypertension presents to the hospital with complaints of more than bilateral lower extremity swelling and pain.  Patient mentioned that the lower extremity swelling has been ongoing for a few years.  He did not pay attention to this as he was dedicated in taking care of his wife who just passed away.  Currently patient has mild leukocytosis of 11 CK of 212.  Inflammatory markers pending.  Venous Doppler and CT lower extremity pending.  Started on IV Rocephin \"    Hospital Course:  #Bilateral lower extremity swelling due to Diastolic CHF  -Patient presented with worsened bilateral lower extremity swelling pain and redness.  Per the patient the bilateral lower extremity swelling has been ongoing for a few years and it gradually worsened.  -CRP " elevated however Pro-Vahe WNL  -MRSA negative  -TSH WNL  - TTE showed diastolic dysfunction  -Bilateral venous Doppler negative for DVT  -CT lower extremity with contrast shows subcutaneous soft tissue edema in bilateral lower extremities.    -Wound culture came back positive for Pseudomonas so we will switch Rocephin to cefepime. Completed course  -wound care consulted  -PT OT to see , recommend rehab     Diastolic CHF, new diagnosis  -CXR showed cardiomegaly with central pulmonary vascular congestion  - BNP elevated, patient is currently breathing comfortably on room air  -Will resume patient on p.o. diuretic we will go ahead and restart losartan as KELLY resolved  -TTE reviewed  -Cardiology consulted, states that will consider elective stress test versus coronary CTA outpatient  -Recommend starting on SGLT2 inhibitor prior to discharge  -Will send in referral to heart failure clinic upon discharge     KELLY likely due to diuresis, resolved  -UA, urine sodium, urine creatinine reviewed  -Avoid nephrotoxic medication  -Continue to monitor renal function        Hematuria  -Blood seen on 2 different UAs  Urology consulted, ordered CT scan hematuria protocol and state will do a bedside cystoscopy on Monday.  Patient has not had any further hematuria so we will have him follow outpatient with urology  Urine culture Show no growth to date     #Hypertension  -Restart losartan on lower dose will increase, continue low-dose amlodipine       Onychomycosis  Podiatry consulted, will have him follow-up outpatient       VTE Prophylaxis:  Pharmacologic VTE prophylaxis orders are present.       Patient was seen and examined at bedside this morning.  No acute complaints overnight.  His bilateral lower extremity swelling has significantly improved as compared to when he came in.  Will send in prescription for losartan and amlodipine to his pharmacy as he had not been taking any blood pressure medications for the last few years.  Will  also send in referral to heart failure clinic with new diagnosis of heart failure preserved ejection fraction.  Will also send in prescription for SGLT2 inhibitor.  His UA was positive for hematuria and was seen by urology however during the hospital stay he has not had any further episodes and so we will have him follow-up with urology outpatient.  Will also send in referral to podiatry for his onychomycosis             DISCHARGE Follow Up Recommendations for labs and diagnostics: Follow-up with PCP in 1 week and with heart failure clinic        Day of Discharge     Vital Signs:  Temp:  [97.2 °F (36.2 °C)-98 °F (36.7 °C)] 97.2 °F (36.2 °C)  Heart Rate:  [66-77] 71  Resp:  [11-20] 11  BP: (133-183)/(57-77) 165/77    Physical Exam:  Physical Exam  Constitutional:       Comments: NAD    Cardiovascular:      Comments:  RRR, S1 & S2   Pulmonary:      Comments:  Lungs CTA   Abdominal:      Comments:  ABD soft, NT             Pertinent  and/or Most Recent Results     LAB RESULTS:      Lab 11/23/24  0009 11/22/24  1010 11/21/24  0400 11/20/24  0546 11/18/24  2347 11/18/24  0203 11/17/24  0040   WBC 9.43 10.13 10.07 8.66 8.27 8.63 12.93*   HEMOGLOBIN 13.1 13.6 14.3 14.9 13.4 12.2* 12.6*   HEMATOCRIT 40.6 42.0 43.7 47.7 39.7 37.1* 38.1   PLATELETS 329 364 445 353 348 307 325   NEUTROS ABS  --   --  7.51* 6.50 6.46 6.75 11.26*   IMMATURE GRANS (ABS)  --   --  0.39* 0.14* 0.11* 0.06* 0.08*   LYMPHS ABS  --   --  1.11 0.98 0.82 0.88 0.52*   MONOS ABS  --   --  0.71 0.68 0.62 0.66 0.91*   EOS ABS  --   --  0.29 0.30 0.22 0.25 0.13   MCV 93.8 96.1 94.6 97.3* 93.0 94.2 94.1         Lab 11/23/24  0009 11/22/24  1010 11/21/24  0400 11/20/24  1640 11/20/24  0546 11/18/24  2347 11/18/24  0203 11/17/24  0040   SODIUM 140 139 137  --  137 136 138 132*   POTASSIUM 4.3 3.9 4.3 4.1 3.6 3.6 3.8 4.0   CHLORIDE 105 103 100  --  95* 99 104 100   CO2 25.1 28.0 26.8  --  30.4* 26.5 24.8 21.9*   ANION GAP 9.9 8.0 10.2  --  11.6 10.5 9.2 10.1    BUN 28* 26* 42*  --  27* 18 15 16   CREATININE 1.05 1.16 1.42*  --  1.37* 1.14 0.91 0.93   EGFR 73.6 65.3 51.2*  --  53.5* 66.7 87.3 85.1   GLUCOSE 87 71 103*  --  87 126* 87 103*   CALCIUM 8.8 9.1 9.4  --  9.6 8.8 8.4* 8.5*   MAGNESIUM  --   --   --   --   --   --  2.3 2.2   PHOSPHORUS  --   --   --   --   --   --  3.1 2.4*         Lab 11/23/24  0009 11/22/24  1010   TOTAL PROTEIN 6.2 6.6   ALBUMIN 3.4* 3.4*   GLOBULIN 2.8 3.2   ALT (SGPT) 60* 64*   AST (SGOT) 55* 49*   BILIRUBIN 0.2 0.2   ALK PHOS 87 88         Lab 11/16/24  1234   PROBNP 2,181.0*             Lab 11/17/24  1006   VITAMIN B 12 406         Brief Urine Lab Results  (Last result in the past 365 days)        Color   Clarity   Blood   Leuk Est   Nitrite   Protein   CREAT   Urine HCG        11/20/24 1025             113.7         11/20/24 1025 Yellow   Clear   Negative   Negative   Negative   Trace                 Microbiology Results (last 10 days)       Procedure Component Value - Date/Time    Urine Culture - Urine, Urine, Clean Catch [[id number]]  (Normal) Collected: 11/16/24 0833    Lab Status: Final result Specimen: Urine, Clean Catch Updated: 11/17/24 1213     Urine Culture No growth    Wound Culture - Wound, Leg [[id number]]  (Abnormal)  (Susceptibility) Collected: 11/16/24 0007    Lab Status: Edited Result - FINAL Specimen: Wound from Leg Updated: 11/18/24 0740     Wound Culture Scant growth (1+) Pseudomonas aeruginosa      Rare growth Enterobacter cloacae complex     Comment:   This organism may develop resistance during prolonged therapy with 3rd generation cephalosporins (e.g. ceftriaxone) as a result of de-repression of AmpC B-lactamase.  Ceftriaxone may be a reasonable treatment option for uncomplicated cystitis or other lower severity infections when susceptibility is demonstrated.         Rare growth Streptococcus agalactiae (Group B)     Comment:   This organism is considered to be universally susceptible to penicillin.  No further  antibiotic testing will be performed. If Clindamycin or Erythromycin is the drug of choice, notify the laboratory within 7 days to request susceptibility testing.  Corrected result. Previously Reported Organism Changed. Previous result was Normal Skin Vida on 11/18/2024 at 0736 EST.         Scant growth (1+) Normal Skin Vida     Comment:   Appended report. These results have been appended to a previously final verified report.        Gram Stain Rare (1+) WBCs per low power field      No organisms seen    Susceptibility        Pseudomonas aeruginosa      CHEMA      Cefepime Susceptible      Ceftazidime Susceptible      Ciprofloxacin Susceptible      Levofloxacin Susceptible      Piperacillin + Tazobactam Susceptible      Tobramycin Susceptible                       Susceptibility        Enterobacter cloacae complex      CHEMA      Cefepime Susceptible      Ceftazidime Resistant      Gentamicin Susceptible      Levofloxacin Susceptible      Trimethoprim + Sulfamethoxazole Susceptible                       Susceptibility Comments       Pseudomonas aeruginosa    With the exception of urinary-sourced infections, aminoglycosides should not be used as monotherapy.      Enterobacter cloacae complex    With the exception of urinary-sourced infections, aminoglycosides should not be used as monotherapy.               MRSA Screen, PCR (Inpatient) - Swab, Nares [[id number]]  (Normal) Collected: 11/15/24 1825    Lab Status: Final result Specimen: Swab from Nares Updated: 11/15/24 2019     MRSA PCR No MRSA Detected    Narrative:      The negative predictive value of this diagnostic test is high and should only be used to consider de-escalating anti-MRSA therapy. A positive result may indicate colonization with MRSA and must be correlated clinically.    Urine Culture - Urine, Urine, Clean Catch [[id number]]  (Normal) Collected: 11/15/24 1824    Lab Status: Final result Specimen: Urine, Clean Catch Updated: 11/17/24 1158     Urine  Culture No growth    Blood Culture - Blood, Arm, Right [[id number]]  (Normal) Collected: 11/15/24 1255    Lab Status: Final result Specimen: Blood from Arm, Right Updated: 11/20/24 1300     Blood Culture No growth at 5 days    Blood Culture - Blood, Arm, Left [[id number]]  (Normal) Collected: 11/15/24 1232    Lab Status: Final result Specimen: Blood from Arm, Left Updated: 11/20/24 1245     Blood Culture No growth at 5 days            CT Head Without Contrast    Result Date: 11/17/2024  Impression: Impression: 1. No acute intracranial abnormality identified by noncontrast CT. 2. Parenchymal volume loss and probable chronic small vessel ischemic change. Electronically Signed: Morro Vanegas MD  11/17/2024 12:40 PM EST  Workstation ID: [id number]    CT Abdomen Pelvis With & Without Contrast    Result Date: 11/16/2024  Impression: Impression: 1.No acute abnormality identified within the abdomen or pelvis. 2.No hydronephrosis or urinary tract calculi identified. 3.Bilateral renal cysts and subcentimeter hypodensities too small to characterize. Largest cyst within the left kidney measures approximately 5.8 cm and may contain a fine septation. Probable tiny hemorrhagic cyst at the upper pole of the right kidney. No suspicious enhancing renal lesions are seen. Excretory phase images show no suspicious filling defects within the opacified urinary tract. 4.Colonic diverticulosis. 5.Trace bilateral pleural effusions with bibasilar atelectasis. Electronically Signed: Darrin Mcgovern MD  11/16/2024 7:05 PM EST  Workstation ID: [id number]    XR Chest 1 View    Result Date: 11/16/2024  Impression: Impression: Cardiomegaly with central pulmonary vascular congestion. Electronically Signed: Morro Vanegas MD  11/16/2024 1:08 PM EST  Workstation ID: [id number]    CT Lower Extremity Bilateral With Contrast    Result Date: 11/15/2024  Impression: Impression: Subcutaneous soft tissue edema involving both lower extremities from the knees to the  feet, nonspecific, but may represent cellulitis in the appropriate clinical context. No organized fluid collection or visible soft tissue gas. No radiographic evidence of acute osteomyelitis. Electronically Signed: Nando Cesar  11/15/2024 7:20 PM EST  Workstation ID: [id number]     Results for orders placed during the hospital encounter of 11/15/24    Duplex Venous Lower Extremity - Bilateral CAR    Interpretation Summary    Normal bilateral lower extremity venous duplex scan.      Results for orders placed during the hospital encounter of 11/15/24    Duplex Venous Lower Extremity - Bilateral CAR    Interpretation Summary    Normal bilateral lower extremity venous duplex scan.      Results for orders placed during the hospital encounter of 11/15/24    Adult Transthoracic Echo Complete W/ Cont if Necessary Per Protocol    Interpretation Summary    Left ventricular systolic function is hyperdynamic (EF > 70%). Calculated left ventricular EF = 72.4%    Left ventricular wall thickness is consistent with mild to moderate concentric hypertrophy.    Left ventricular diastolic function is consistent with (grade I) impaired relaxation.    The left atrial cavity is mild to moderately dilated.    Transthoracic echocardiography reveals mild to moderate LVH with EF of 70%  The anterior leaflet of mitral valve is thickened particularly at the tips with calcification and the differential diagnosis can be an old vegetation which is calcified versus calcific disease of the mitral valve and differential diagnosis can also be for rheumatic disease and mild to moderate eccentric MR directed posteriorly and laterally.  No effusion    Electronically signed by Troy Leger MD, 11/17/24, 3:17 PM EST.      Labs Pending at Discharge:  Pending Results       None            Procedures Performed           Consults:   Consults       Date and Time Order Name Status Description    11/19/2024 12:00 PM Inpatient Podiatry Consult       11/18/2024  1:16 PM Inpatient Cardiology Consult Completed     11/17/2024 12:32 PM Inpatient Psychiatrist Consult Completed     11/15/2024  6:06 PM Inpatient Urology Consult Completed     11/15/2024  1:57 PM Hospitalist (on-call MD unless specified)                Discharge Details        Discharge Medications        New Medications        Instructions Start Date   amLODIPine 5 MG tablet  Commonly known as: NORVASC   5 mg, Oral, Every 24 Hours Scheduled      empagliflozin 10 MG tablet tablet  Commonly known as: JARDIANCE   10 mg, Oral, Daily      losartan 25 MG tablet  Commonly known as: COZAAR   50 mg, Oral, Every 24 Hours Scheduled             Continue These Medications        Instructions Start Date   VITAMIN D (CHOLECALCIFEROL) PO   1 capsule, Daily             Stop These Medications      buPROPion  MG 24 hr tablet  Commonly known as: WELLBUTRIN XL     lisinopril-hydrochlorothiazide 20-12.5 MG per tablet  Commonly known as: PRINZIDE,ZESTORETIC     vitamin D 1.25 MG (66822 UT) capsule capsule  Commonly known as: ERGOCALCIFEROL              No Known Allergies      Discharge Disposition: rehab      Diet:  Hospital:  Diet Order   Procedures    Diet: Cardiac; Healthy Heart (2-3 Na+); Fluid Consistency: Thin (IDDSI 0)         Discharge Activity:         CODE STATUS:  There are no questions and answers to display.         No future appointments.    Additional Instructions for the Follow-ups that You Need to Schedule       Ambulatory Referral to Wound Clinic   As directed              Time spent on Discharge including face to face service:  35 minutes    Signature: Electronically signed by Latia Amanda MD, 11/23/24, 08:12 EST.  [facility] Hospitalist Team

## 2024-11-23 NOTE — PLAN OF CARE
Goal Outcome Evaluation:  Plan of Care Reviewed With: patient           Outcome Evaluation: Pt. alert and oriented. Dx: cellulitis. Ace wraps in place BLE. Continues IV abt. Denies pain or discomfort. R/A. Possible d/c if kidney functions improve.

## 2024-11-24 NOTE — CASE MANAGEMENT/SOCIAL WORK
Case Management Discharge Note      Final Note: [place]    Provided Post Acute Provider List?: Yes  Post Acute Provider List: Nursing Home  Provided Post Acute Provider Quality & Resource List?: N/A  Delivered To: Patient  Method of Delivery: In person    Selected Continued Care - Discharged on 11/23/2024 Admission date: 11/15/2024 - Discharge disposition: Rehab Facility or Unit (DC - External)      Destination Coordination complete.      Service Provider Services Address Phone Fax Patient Preferred    [facility] [address] [id number] [phone] --                 Transportation Services  Private: Car    Final Discharge Disposition Code: 03 - skilled nursing facility (SNF)

## 2024-11-26 ENCOUNTER — OFFICE VISIT (OUTPATIENT)
Age: 74
End: 2024-11-26
Payer: MEDICARE

## 2024-11-26 VITALS — WEIGHT: 195 LBS | HEIGHT: 69 IN | RESPIRATION RATE: 20 BRPM | BODY MASS INDEX: 28.88 KG/M2

## 2024-11-26 DIAGNOSIS — M79.672 BILATERAL FOOT PAIN: Primary | ICD-10-CM

## 2024-11-26 DIAGNOSIS — I87.303 CHRONIC VENOUS HYPERTENSION WITHOUT COMPLICATIONS, BILATERAL: ICD-10-CM

## 2024-11-26 DIAGNOSIS — B35.1 ONYCHOMYCOSIS: ICD-10-CM

## 2024-11-26 DIAGNOSIS — M79.671 BILATERAL FOOT PAIN: Primary | ICD-10-CM

## 2024-11-26 RX ORDER — BUSPIRONE HYDROCHLORIDE 5 MG/1
5 TABLET ORAL 3 TIMES DAILY
COMMUNITY

## 2024-11-27 NOTE — PROGRESS NOTES
11/26/2024  Foot and Ankle Surgery - New Patient   Provider: Dr. Jerrell Soto DPM  Location: [facility]    Subjective:  Roger Nguyen is a 74 y.o. male.     Chief Complaint   Patient presents with    Left Lower Leg - Cellulitis, Bunions, Nail Problem, Diabetes    Right Lower Leg - Cellulitis, Bunions, Nail Problem, Diabetes    Initial Evaluation     NO PCP       History of Present Illness  The patient presents for evaluation of his feet. He is accompanied by an adult male.    He reports that his legs are currently peeling, a condition that was also present during his last visit. His legs are wrapped daily with a Vaseline-type bandage, gauze, and an Ace bandage. This morning was the first time he has seen his legs unwrapped since his hospital stay. The bandages are changed daily.    He also mentions that his balance is currently poor.    He is currently residing at the [facility], a temporary care facility, but previously lived at home.    He was in the hospital recently for 8 days. He had high blood pressure and has follow-ups with cardiology.       No Known Allergies    Past Medical History:   Diagnosis Date    Anxiety and depression     Hypertension        History reviewed. No pertinent surgical history.    Family History   Family history unknown: Yes       Social History     Socioeconomic History    Marital status:    Tobacco Use    Smoking status: Never     Passive exposure: Never    Smokeless tobacco: Never   Vaping Use    Vaping status: Never Used   Substance and Sexual Activity    Alcohol use: Never    Drug use: Never    Sexual activity: Defer        Current Outpatient Medications on File Prior to Visit   Medication Sig Dispense Refill    amLODIPine (NORVASC) 5 MG tablet Take 1 tablet by mouth Daily for 60 days. 30 tablet 1    empagliflozin (JARDIANCE) 10 MG tablet tablet Take 1 tablet by mouth Daily for 60 days. 30 tablet 1    losartan (COZAAR) 25 MG tablet Take 2 tablets  "by mouth Daily for 60 days. 60 tablet 1    VITAMIN D, CHOLECALCIFEROL, PO Take 1 capsule by mouth Daily.      busPIRone (BUSPAR) 5 MG tablet Take 1 tablet by mouth 3 (Three) Times a Day.      empagliflozin (Jardiance) 10 MG tablet tablet Take  by mouth Daily.       No current facility-administered medications on file prior to visit.         Objective   Resp 20   Ht 175.3 cm (69\")   Wt 88.5 kg (195 lb)   BMI 28.80 kg/m²     Foot/Ankle Exam    GENERAL  Appearance:  appears stated age  Orientation:  AAOx3  Affect:  appropriate    VASCULAR     Right Foot Vascularity   Dorsalis pedis:  2+  Posterior tibial:  2+  Skin temperature:  warm  Edema gradin+  CFT:  3  Pedal hair growth:  Absent     Left Foot Vascularity   Dorsalis pedis:  2+  Posterior tibial:  2+  Skin temperature:  warm  Edema gradin+  CFT:  3  Pedal hair growth:  Absent     NEUROLOGIC     Right Foot Neurologic   Light touch sensation: normal  Hot/Cold sensation: normal  Achilles reflex:  2+     Left Foot Neurologic   Light touch sensation: normal  Hot/Cold sensation:  normal  Achilles reflex:  2+    MUSCULOSKELETAL     Right Foot Musculoskeletal   Ecchymosis:  none  Tenderness:  toe 1 tenderness, toe 2 tenderness, toe 3 tenderness, toe 4 tenderness and toe 5 tenderness    Arch:  Pes planus     Left Foot Musculoskeletal   Ecchymosis:  none  Tenderness:  toe 1 tenderness, toe 2 tenderness, toe 3 tenderness, toe 4 tenderness and toe 5 tenderness  Arch:  Pes planus    MUSCLE STRENGTH     Right Foot Muscle Strength   Normal strength    Foot dorsiflexion:  5  Foot plantar flexion:  5  Foot inversion:  5  Foot eversion:  5     Left Foot Muscle Strength   Normal strength    Foot dorsiflexion:  5  Foot plantar flexion:  5  Foot inversion:  5  Foot eversion:  5    DERMATOLOGIC      Right Foot Dermatologic   Skin  Right foot skin is intact.   Nails comment:  Nails 1-5     Left Foot Dermatologic   Skin  Left foot skin is intact.   Nails comment:  Nails " 1-5    TESTS     Right Foot Tests   Anterior drawer: negative  Varus tilt: negative     Left Foot Tests   Anterior drawer: negative  Varus tilt: negative     Right foot additional comments: Hyperpigmentation noted to both lower extremities consistent with venous stasis.  No open wounds or signs of infection.  Gradual improvement involving desquamation involving both lower extremities.  Stable eschar formation involving the dorsal aspect of the right foot.     Left foot additional comments: Nails are thickened, dystrophic, and fungal in appearance x 10.    Physical Exam         Results         Assessment & Plan   Diagnoses and all orders for this visit:    1. Bilateral foot pain (Primary)    2. Onychomycosis    3. Chronic venous hypertension without complications, bilateral       Assessment & Plan    The foot condition appears to be due to swelling and skin changes. The nails are thickened and curled, likely due to a fungal infection, but this is not a major concern at this point. The scab on the foot is healing, which is a positive sign. Fluid balance seems to be improving, and there is no need for daily wraps as there are no open wounds. Routine foot care and compression therapy for the legs are recommended. He should continue with the therapy exercises being done at the facility and maintain an active lifestyle. Daily moisturizing with over-the-counter Aquaphor or a similar cream is advised, along with daily showers. Tubigrips will be provided for compression, and standard compression stockings are recommended for use at home. He should wear the compression stockings during the day and remove them at night. Elevating the legs while sitting is also advised. Routine follow-ups with the primary care doctor are recommended to monitor fluid retention. He should contact the clinic if any issues arise outside of the routine foot care. Mild compression stockings will be provided today. He is advised to visit [facility] for appropriate fitting of compression stockings.Reviewed proper basic stretching and manual therapy exercises along with appropriate shoes and activity.  Discussed proper use and/or avoidance of OTC anti-inflammatories.  Patient is to call with any additional issues or concerns.  Greater than 30 minutes was spent before, during, and after evaluation for patient care.    Follow-up  Return in 3 months for routine foot care with the nurse practitioner.           Patient or patient representative verbalized consent for the use of Ambient Listening during the visit with  SIRISHA Soto DPM for chart documentation. 11/27/2024  06:52 EST    SIRISHA Soto DPM

## 2024-12-12 ENCOUNTER — DOCUMENTATION (OUTPATIENT)
Dept: HOME HEALTH SERVICES | Facility: HOME HEALTHCARE | Age: 74
End: 2024-12-12
Payer: MEDICARE

## 2024-12-12 ENCOUNTER — HOME HEALTH ADMISSION (OUTPATIENT)
Dept: HOME HEALTH SERVICES | Facility: HOME HEALTHCARE | Age: 74
End: 2024-12-12
Payer: MEDICARE

## 2024-12-12 ENCOUNTER — TRANSCRIBE ORDERS (OUTPATIENT)
Dept: HOME HEALTH SERVICES | Facility: HOME HEALTHCARE | Age: 74
End: 2024-12-12
Payer: MEDICARE

## 2024-12-12 DIAGNOSIS — I50.9 CONGESTIVE HEART FAILURE, UNSPECIFIED HF CHRONICITY, UNSPECIFIED HEART FAILURE TYPE: Primary | ICD-10-CM

## 2024-12-12 NOTE — PROGRESS NOTES
SNF Referral  Roger Nguyen (Jason) - [birth year]  DC 12/11 from [place]   SN/PT/OT     *Note: Dr. Perfecto Arreola will be the attending provider until 12/20/24 as the patient will have a new PCP Dr. Corry Stewart.   After 12/20/24 send all plans of care, etc., to Dr. Stewart.   Dr. Perfecto Arreola fax number: [phone].   Dr. Perfecto Arreola agrees to follow the HH POC until the patient is established with Dr. Stewart per B[place].     mEily Bonilla NP performed the F2F on 11/25/24     Dx: Congestive heart failure, HTN, anxiety disorder, recurrent depression and cellulitis of left lower limb.     Emily Bonilla NP is the ordering provider: SN/PT/OT     Address confirmed:  Patient staying with son Roel)  [address] [address]    Contact:  (Bruno) [phone]    I spoke with the patient's son, Bruno on 12/11 and they are agreeable to home health

## 2024-12-13 ENCOUNTER — HOME CARE VISIT (OUTPATIENT)
Dept: HOME HEALTH SERVICES | Facility: HOME HEALTHCARE | Age: 74
End: 2024-12-13
Payer: MEDICARE

## 2024-12-13 PROCEDURE — G0299 HHS/HOSPICE OF RN EA 15 MIN: HCPCS

## 2024-12-13 NOTE — Clinical Note
"SOC Note: Patient is cooperative, friendly and forgetful at times . Patient relies on his caregiver to make appointments, help with ADL's, and medications. Checked patients vitals all WNL and patient denies pain. Patients lungs are CTA. Positive bowel sounds and bowels are daily. Patient occasionally incontinent and wears briefs, no burning with urination. Patient has bilateral lower leg cellulitis with edema, all other skin areas are intact dry and warm. Patient is unsteady ambulating especially after rising from a seated position.     Home Health ordered for: SN, PT, OT    Reason for Hosp/Primary Dx/Co-morbidities:   Reason: Bilateral lower leg cellulitis, Acute diastolic heart failure with preserved  Primary DX: Bilateral lower leg cellulitis   Co-Morbidities: Acute Diastolic Heart Failure HTN depression anxiety obesity mitral regurgitation KELLY    Focus of Care: Cellulitis, Patient education, improving mobility    Patient's goal(s): \"To be able to walk better and to go home.\"    Current Functional status/mobility/DME: Patient uses a quad cane but should really use a walker. Patient is unsteady especially when rising from a seated position. Quad Cane, shower bench.    HB status/Living Arrangements: Patient is homebound. Patient lives with his ex son in-law Bruno and his girlfriend. Bruno appears to be an excellent caregiver and manages appointments, medications, and helps patient with ADL's.    Skin Integrity/wound status: Cellulitis with edema bilateral lower legs.    Code Status: Full Code    Fall Risk/Safety concerns: Patient is a fall risk. Patient ambulates with a quad cane but would benefit with a walker. Patients caregiver will be returning to work.    Educated on Emergency Plan, steps to take prior to going to the ER and when to Call Home Health [facility]:  Reviewed admission packet with patient. Documents left in home.     Medication issues/Concerns: Patient has lack of medication knowledge.    Additional " Problems/Concerns: Patient has just lost his wife the first of November which has increased his depression.    SDOH Barriers: Patient currently cannot drive. Patients caregiver Bruno will have to go back to work soon. Patients caregiver has made sure patient has life alert and wears all the time.

## 2024-12-14 VITALS
HEART RATE: 75 BPM | BODY MASS INDEX: 22.79 KG/M2 | WEIGHT: 197 LBS | SYSTOLIC BLOOD PRESSURE: 188 MMHG | OXYGEN SATURATION: 99 % | HEIGHT: 78 IN | TEMPERATURE: 98.2 F | DIASTOLIC BLOOD PRESSURE: 69 MMHG

## 2024-12-16 ENCOUNTER — HOME CARE VISIT (OUTPATIENT)
Dept: HOME HEALTH SERVICES | Facility: HOME HEALTHCARE | Age: 74
End: 2024-12-16
Payer: MEDICARE

## 2024-12-16 VITALS
DIASTOLIC BLOOD PRESSURE: 98 MMHG | HEART RATE: 82 BPM | OXYGEN SATURATION: 98 % | TEMPERATURE: 97.8 F | SYSTOLIC BLOOD PRESSURE: 190 MMHG | RESPIRATION RATE: 18 BRPM

## 2024-12-16 PROCEDURE — G0151 HHCP-SERV OF PT,EA 15 MIN: HCPCS

## 2024-12-16 NOTE — HOME HEALTH
"Assessment/Referral:  Patient is a 73 y/o male presenting to home health PT for initial evaluation following recent hospitalization and rehab stay. Patient's wife Radha  on 24, he was her primary caregiver for many years and reports he neglected his own health. 1 week after Radha passed the patient was admitted to the hospital for significant swelling in his legs, he was admitted for cellulitis and CHF. Patient reports he received too much lasix in the hospital which led to problems with his kidneys. He was admitted to [facility] for rehab. Patient had a poor experience at [facility], reports he was diagnosed with Covid and was told he would have to isolate for 10 days with no visitors. Patient's former son in law was able to get him discharged from [facility] and took him home to stay with him. The patient is now staying with former son-in-law and his 2 granddaughters. He presents with generalized weakness, impaired balance, gait mechanics, and overall functional mobility. He had the following performance on functional outcome measures:     30 second sit to stand: 5 repetitions   TImed up and go: 20 seconds, cut off score for community dwelling adults is 13.5 seconds indicating increased risk for falls   Tinetti: 13/28 indicating high risk for falls.     He is very motivated to improve and will likely show good progress with PT services. He will benefit from skilled PT to improve strength, balance, gait mechanics, and overall safety to allow return to prior level of function.       Primary Focus of Care:  Weakness and impaired balance related to LE cellulitis, CHF with recent hospitalization     PLOF/Environment:  Previously independent was not using assistive device.     Pt's Personal Goal:  \"To gain strength back\"     Homebound reason(s):  Pt is homebound due to considerable taxing effort to leave home including:  Requires use of AD, is a high risk for falls, requires assist of a second person to " safely leave the home.     Plan for Next Visit:  Address form with standing ther ex, single leg stance stability, glute activation for improved gait mechanics.

## 2024-12-17 ENCOUNTER — HOME CARE VISIT (OUTPATIENT)
Dept: HOME HEALTH SERVICES | Facility: HOME HEALTHCARE | Age: 74
End: 2024-12-17
Payer: COMMERCIAL

## 2024-12-17 VITALS — SYSTOLIC BLOOD PRESSURE: 148 MMHG | HEART RATE: 77 BPM | OXYGEN SATURATION: 98 % | DIASTOLIC BLOOD PRESSURE: 70 MMHG

## 2024-12-17 PROCEDURE — G0152 HHCP-SERV OF OT,EA 15 MIN: HCPCS

## 2024-12-18 ENCOUNTER — HOME CARE VISIT (OUTPATIENT)
Dept: HOME HEALTH SERVICES | Facility: HOME HEALTHCARE | Age: 74
End: 2024-12-18
Payer: MEDICARE

## 2024-12-18 NOTE — HOME HEALTH
Pt is a 73 yo male who lives with ex JEAN.    Pt recently hospitalized and then to IP rehab secondary to cellulitis and CHF.      PLOF pt independent with all ADLs      CLOF pt independent with feeding and grooming after set up.   CGA with toileting and MOD assist with bathing and dressing.   Pt requires total assist with IADLS      SkIlled OT for ther ex and adl training.   Pt and therapist in agreement with goals and poc.      Pt denies any falls or med changes

## 2024-12-19 ENCOUNTER — HOME CARE VISIT (OUTPATIENT)
Dept: HOME HEALTH SERVICES | Facility: HOME HEALTHCARE | Age: 74
End: 2024-12-19
Payer: COMMERCIAL

## 2024-12-19 PROCEDURE — G0299 HHS/HOSPICE OF RN EA 15 MIN: HCPCS

## 2024-12-22 VITALS
DIASTOLIC BLOOD PRESSURE: 86 MMHG | SYSTOLIC BLOOD PRESSURE: 138 MMHG | HEART RATE: 89 BPM | TEMPERATURE: 97.3 F | RESPIRATION RATE: 17 BRPM | OXYGEN SATURATION: 98 %

## 2024-12-24 ENCOUNTER — HOME CARE VISIT (OUTPATIENT)
Dept: HOME HEALTH SERVICES | Facility: HOME HEALTHCARE | Age: 74
End: 2024-12-24
Payer: MEDICARE

## 2024-12-24 PROCEDURE — G0299 HHS/HOSPICE OF RN EA 15 MIN: HCPCS

## 2024-12-26 ENCOUNTER — HOME CARE VISIT (OUTPATIENT)
Dept: HOME HEALTH SERVICES | Facility: HOME HEALTHCARE | Age: 74
End: 2024-12-26
Payer: MEDICARE

## 2024-12-26 VITALS
TEMPERATURE: 98 F | SYSTOLIC BLOOD PRESSURE: 144 MMHG | RESPIRATION RATE: 16 BRPM | DIASTOLIC BLOOD PRESSURE: 70 MMHG | OXYGEN SATURATION: 98 % | HEART RATE: 78 BPM

## 2024-12-26 VITALS
OXYGEN SATURATION: 98 % | RESPIRATION RATE: 18 BRPM | SYSTOLIC BLOOD PRESSURE: 210 MMHG | DIASTOLIC BLOOD PRESSURE: 100 MMHG | HEART RATE: 82 BPM | TEMPERATURE: 97.8 F

## 2024-12-26 PROCEDURE — G0151 HHCP-SERV OF PT,EA 15 MIN: HCPCS

## 2024-12-27 ENCOUNTER — HOME CARE VISIT (OUTPATIENT)
Dept: HOME HEALTH SERVICES | Facility: HOME HEALTHCARE | Age: 74
End: 2024-12-27
Payer: MEDICARE

## 2024-12-30 ENCOUNTER — HOME CARE VISIT (OUTPATIENT)
Dept: HOME HEALTH SERVICES | Facility: HOME HEALTHCARE | Age: 74
End: 2024-12-30
Payer: MEDICARE

## 2024-12-30 VITALS
SYSTOLIC BLOOD PRESSURE: 168 MMHG | RESPIRATION RATE: 16 BRPM | DIASTOLIC BLOOD PRESSURE: 80 MMHG | OXYGEN SATURATION: 99 % | TEMPERATURE: 97 F | HEART RATE: 77 BPM

## 2024-12-30 PROCEDURE — G0299 HHS/HOSPICE OF RN EA 15 MIN: HCPCS

## 2025-01-02 ENCOUNTER — HOME CARE VISIT (OUTPATIENT)
Dept: HOME HEALTH SERVICES | Facility: HOME HEALTHCARE | Age: 75
End: 2025-01-02
Payer: MEDICARE

## 2025-01-02 VITALS
RESPIRATION RATE: 18 BRPM | HEART RATE: 78 BPM | TEMPERATURE: 97.8 F | OXYGEN SATURATION: 98 % | DIASTOLIC BLOOD PRESSURE: 86 MMHG | SYSTOLIC BLOOD PRESSURE: 178 MMHG

## 2025-01-02 PROCEDURE — G0151 HHCP-SERV OF PT,EA 15 MIN: HCPCS

## 2025-01-03 ENCOUNTER — HOME CARE VISIT (OUTPATIENT)
Dept: HOME HEALTH SERVICES | Facility: HOME HEALTHCARE | Age: 75
End: 2025-01-03
Payer: MEDICARE

## 2025-01-03 PROCEDURE — G0299 HHS/HOSPICE OF RN EA 15 MIN: HCPCS

## 2025-01-06 ENCOUNTER — HOME CARE VISIT (OUTPATIENT)
Dept: HOME HEALTH SERVICES | Facility: HOME HEALTHCARE | Age: 75
End: 2025-01-06
Payer: MEDICARE

## 2025-01-07 VITALS
HEART RATE: 76 BPM | RESPIRATION RATE: 17 BRPM | TEMPERATURE: 97.9 F | OXYGEN SATURATION: 98 % | DIASTOLIC BLOOD PRESSURE: 84 MMHG | SYSTOLIC BLOOD PRESSURE: 144 MMHG

## 2025-01-08 ENCOUNTER — TELEPHONE (OUTPATIENT)
Dept: ORTHOPEDIC SURGERY | Facility: CLINIC | Age: 75
End: 2025-01-08
Payer: MEDICARE

## 2025-01-08 NOTE — TELEPHONE ENCOUNTER
willy from [facility] called and asked if Dr. Soto would sign patients home health plan of care and follow his home health orders? Please call to discuss [phone]

## 2025-01-08 NOTE — TELEPHONE ENCOUNTER
Seen patient in hospital for bilateral leg cellulitis.  He is scheduled to see Teresa in Feb 2025.

## 2025-01-14 NOTE — TELEPHONE ENCOUNTER
Ilana Reyes APRN  You4 minutes ago (4:57 PM)       Discussed with [facility] nurse who is going to have pcp sign.     You  Ilana Reyes APRN6 days ago     AS  Teresa patient has an appointment with you in Feb.  Can you be signing provider for ?     SIRISHA Soto DPM  You6 days ago       Teresa could follow for home health orders or his PCP

## 2025-02-28 ENCOUNTER — OFFICE VISIT (OUTPATIENT)
Age: 75
End: 2025-02-28
Payer: MEDICARE

## 2025-02-28 VITALS — HEART RATE: 76 BPM | BODY MASS INDEX: 33.27 KG/M2 | HEIGHT: 69 IN | OXYGEN SATURATION: 94 % | WEIGHT: 224.6 LBS

## 2025-02-28 DIAGNOSIS — M21.619 BUNION: ICD-10-CM

## 2025-02-28 DIAGNOSIS — M79.674 PAIN IN TOES OF BOTH FEET: ICD-10-CM

## 2025-02-28 DIAGNOSIS — G62.9 NEUROPATHY: ICD-10-CM

## 2025-02-28 DIAGNOSIS — B35.1 ONYCHOMYCOSIS: Primary | ICD-10-CM

## 2025-02-28 DIAGNOSIS — M79.675 PAIN IN TOES OF BOTH FEET: ICD-10-CM

## 2025-02-28 DIAGNOSIS — R26.81 UNSTEADINESS ON FEET: ICD-10-CM

## 2025-02-28 DIAGNOSIS — I87.303 CHRONIC VENOUS HYPERTENSION WITHOUT COMPLICATIONS, BILATERAL: ICD-10-CM

## 2025-02-28 NOTE — PROGRESS NOTES
02/28/2025  Foot and Ankle Surgery - Established Patient/Follow-up  Provider: ABDIEL Rankin   Location: [facility]    Subjective:  Roger Nguyen is a 75 y.o. male.     Chief Complaint   Patient presents with    Left Foot - Cellulitis    Right Foot - Cellulitis     Worse on the rt    Follow-Up     Corry Stewart MD  1/10/25       HPI: Patient returns to clinic today referral from Dr. Soto for follow-up of bilateral foot check and chronic venous insufficiency.  He is here today with adult male who contributes to care.  They report that patient has been doing well with wearing compression stockings and applying moisturizer to feet daily.  Patient is not aware of any significant neuropathy to his feet he does struggle with getting to his feet to take care of his nails and request nail debridement today.  Patient reports toenails are causing pain.  Patient reports problems with balance.  Patient denies any significant pain from bunion foot deformity to both feet but adult male inquires about further support.    No Known Allergies    Current Outpatient Medications on File Prior to Visit   Medication Sig Dispense Refill    buPROPion SR (Wellbutrin SR) 150 MG 12 hr tablet Take 1 tablet by mouth Daily. Indications: Major Depressive Disorder      busPIRone (BUSPAR) 5 MG tablet Take 3 tablets by mouth 2 (Two) Times a Day. Indications: Anxiety Disorder      cloNIDine (CATAPRES) 0.2 MG tablet Take 0.5 tablets by mouth 2 (Two) Times a Day. Indications: High Blood Pressure      Misc Natural Products (PROSTATE HEALTH PO) Take 1 capsule by mouth Daily. Indications:      spironolactone (ALDACTONE) 50 MG tablet Take 1 tablet by mouth Daily. Indications: Edema      losartan (COZAAR) 25 MG tablet Take 2 tablets by mouth Daily for 60 days. (Patient taking differently: Take 4 tablets by mouth Daily. Indications: High Blood Pressure) 60 tablet 1     No current facility-administered medications on file prior to  "visit.       Objective   Pulse 76   Ht 175.3 cm (69\")   Wt 102 kg (224 lb 9.6 oz)   SpO2 94%   BMI 33.17 kg/m²     Foot/Ankle Exam    GENERAL  Appearance:  elderly  Orientation:  AAOx3  Affect:  appropriate  Gait:  steppage  Assistance:  assistance by care giver  Right shoe gear: casual shoe and sock  Left shoe gear: casual shoe and sock    VASCULAR     Right Foot Vascularity   Dorsalis pedis:  2+  Skin temperature:  warm  Edema grading:  Trace  CFT:  < 3 seconds  Pedal hair growth:  Absent  Varicosities:  mild varicosities     Left Foot Vascularity   Dorsalis pedis:  2+  Skin temperature:  warm  Edema grading:  Trace  CFT:  < 3 seconds  Pedal hair growth:  Absent  Varicosities:  mild varicosities     NEUROLOGIC     Right Foot Neurologic   Light touch sensation: normal  Protective Sensation using [place] Monofilament:   Sites intact: 9  Sites tested: 10     Left Foot Neurologic   Light touch sensation: normal  Protective Sensation using [place] Monofilament:   Sites intact: 9  Sites tested: 10    MUSCULOSKELETAL     Right Foot Musculoskeletal   Ecchymosis:  none  Tenderness:  toenail problem    Arch:  Pes planus  Hallux valgus: Yes       Left Foot Musculoskeletal   Ecchymosis:  none  Tenderness:  toenail problem  Arch:  Pes planus  Hallux valgus: Yes      DERMATOLOGIC      Right Foot Dermatologic   Skin  Positive for dryness and skin changes.   Nails  1.  Positive for elongated, onychomycosis, abnormal thickness and dystrophic nail.  2.  Positive for elongated, onychomycosis, abnormal thickness and dystrophic nail.  3.  Positive for elongated, onychomycosis, abnormal thickness and dystrophic nail.  4.  Positive for elongated, onychomycosis, abnormal thickness and dystrophic nail.  5.  Positive for elongated, onychomycosis and abnormal thickness.     Left Foot Dermatologic   Skin  Positive for dryness and skin changes.   Nails  1.  Positive for elongated, onychomycosis, abnormal thickness and " dystrophic nail.  2.  Positive for elongated, onychomycosis, abnormal thickness and dystrophic nail.  3.  Positive for elongated, onychomycosis, abnormal thickness and dystrophic nail.  4.  Positive for elongated, onychomycosis, abnormally thick and dystrophic nail.  5.  Positive for elongated, onychomycosis, abnormally thick and dystrophic nail.    Assessment & Plan   Diagnoses and all orders for this visit:    1. Onychomycosis (Primary)    2. Chronic venous hypertension without complications, bilateral    3. Pain in toes of both feet    4. Unsteadiness on feet    5. Neuropathy    6. Bunion      On exam bilateral feet appear stable with no open wounds or signs of active acute infection or inflammation.  Patient's venous insufficiency was discussed and patient appears to be controlling well with leg elevation daily hygiene and compression stockings.  Patient does have bunion foot formation but he denies any problems or pain.  I do feel that patient would possibly benefit from over-the-counter arch supports with metatarsal pad if he starts to have discomfort with increased activity.  Also recommended patient consider going to [facility] for shoe fitting and discussed importance of a shoe that accommodates bunion.  Patient has mild neuropathy and is unable to get to his nails to trim them safely.  Advised to avoid going barefoot to check feet daily.    Nail debridement: Both feet x10    Consent and time out was performed before proceeding with the procedure. Nails were debrided with a nail nipper without complication.  No anesthesia was required.  Indications for procedure were thickened, dystrophic, and fungal appearing nails which are difficult to trim.  Proper self-care and technique was discussed with patient.  Patient was stable after procedure.     Patient will follow-up with me in 3 months for routine foot check.    No orders of the defined types were placed in this encounter.         Note is dictated  utilizing voice recognition software. Unfortunately this leads to occasional typographical errors. I apologize in advance if the situation occurs. If questions occur please do not hesitate to call our office.

## 2025-03-21 ENCOUNTER — OFFICE VISIT (OUTPATIENT)
Dept: CARDIOLOGY | Facility: CLINIC | Age: 75
End: 2025-03-21
Payer: MEDICARE

## 2025-03-21 VITALS
BODY MASS INDEX: 32.58 KG/M2 | WEIGHT: 220 LBS | HEART RATE: 69 BPM | HEIGHT: 69 IN | DIASTOLIC BLOOD PRESSURE: 80 MMHG | OXYGEN SATURATION: 97 % | SYSTOLIC BLOOD PRESSURE: 139 MMHG

## 2025-03-21 DIAGNOSIS — R07.9 CHEST PAIN, UNSPECIFIED TYPE: Primary | ICD-10-CM

## 2025-03-21 DIAGNOSIS — I50.32 CHRONIC HEART FAILURE WITH PRESERVED EJECTION FRACTION (HFPEF): ICD-10-CM

## 2025-03-21 DIAGNOSIS — E66.09 CLASS 1 OBESITY DUE TO EXCESS CALORIES WITHOUT SERIOUS COMORBIDITY WITH BODY MASS INDEX (BMI) OF 32.0 TO 32.9 IN ADULT: ICD-10-CM

## 2025-03-21 DIAGNOSIS — L03.119 CELLULITIS OF LOWER EXTREMITY, UNSPECIFIED LATERALITY: ICD-10-CM

## 2025-03-21 DIAGNOSIS — K21.9 GASTROESOPHAGEAL REFLUX DISEASE WITHOUT ESOPHAGITIS: ICD-10-CM

## 2025-03-21 DIAGNOSIS — E66.811 CLASS 1 OBESITY DUE TO EXCESS CALORIES WITHOUT SERIOUS COMORBIDITY WITH BODY MASS INDEX (BMI) OF 32.0 TO 32.9 IN ADULT: ICD-10-CM

## 2025-03-21 DIAGNOSIS — I10 PRIMARY HYPERTENSION: Chronic | ICD-10-CM

## 2025-03-21 DIAGNOSIS — I20.9 ANGINA PECTORIS: ICD-10-CM

## 2025-03-21 DIAGNOSIS — R01.1 HEART MURMUR: ICD-10-CM

## 2025-03-21 RX ORDER — TAMSULOSIN HYDROCHLORIDE 0.4 MG/1
CAPSULE ORAL
COMMUNITY
Start: 2025-03-11

## 2025-03-21 RX ORDER — LOSARTAN POTASSIUM AND HYDROCHLOROTHIAZIDE 12.5; 5 MG/1; MG/1
1 TABLET ORAL DAILY
COMMUNITY

## 2025-03-21 RX ORDER — AMLODIPINE BESYLATE 10 MG/1
10 TABLET ORAL DAILY
COMMUNITY

## 2025-03-21 NOTE — PROGRESS NOTES
Encounter Date:03/21/2025        Patient ID: Roger Nguyen is a 75 y.o. male.      Chief Complaint:      History of Present Illness  75 years old man with hypertension, hyperlipidemia, HFpEF who was previously in the hospital for cellulitis.  He was also noted to be in heart failure exacerbation requiring IV diuretics.  He then developed KELLY.  Blood pressure medications were adjusted.  Today he comes in for follow-up.  He complains of occasional dizziness and shortness of breath with exertion.    Current cardiac medications include amlodipine, Jardiance, clonidine, losartan, hydrochlorothiazide, Aldactone.    The following portions of the patient's history were reviewed and updated as appropriate: allergies, current medications, past family history, past medical history, past social history, past surgical history, and problem list.    Review of Systems   Neurological:  Positive for dizziness.         Current Outpatient Medications:     buPROPion SR (Wellbutrin SR) 150 MG 12 hr tablet, Take 1 tablet by mouth Daily. Indications: Major Depressive Disorder, Disp: , Rfl:     busPIRone (BUSPAR) 5 MG tablet, Take 3 tablets by mouth 2 (Two) Times a Day. Indications: Anxiety Disorder, Disp: , Rfl:     cloNIDine (CATAPRES) 0.2 MG tablet, Take 0.5 tablets by mouth 2 (Two) Times a Day. Indications: High Blood Pressure, Disp: , Rfl:     empagliflozin (Jardiance) 10 MG tablet tablet, Take 1 tablet by mouth Daily., Disp: , Rfl:     losartan-hydrochlorothiazide (HYZAAR) 50-12.5 MG per tablet, Take 1 tablet by mouth Daily., Disp: , Rfl:     Misc Natural Products (PROSTATE HEALTH PO), Take 1 capsule by mouth Daily. Indications:, Disp: , Rfl:     spironolactone (ALDACTONE) 50 MG tablet, Take 1 tablet by mouth Daily. Indications: Edema, Disp: , Rfl:     tamsulosin (FLOMAX) 0.4 MG capsule 24 hr capsule, , Disp: , Rfl:     amLODIPine (NORVASC) 10 MG tablet, Take 1 tablet by mouth Daily., Disp: , Rfl:     losartan (COZAAR) 25 MG tablet,  "Take 2 tablets by mouth Daily for 60 days. (Patient taking differently: Take 4 tablets by mouth Daily. Indications: High Blood Pressure), Disp: 60 tablet, Rfl: 1    Current outpatient and discharge medications have been reconciled for the patient.  Reviewed by: Shane Mack MD       No Known Allergies    Family History   Family history unknown: Yes       History reviewed. No pertinent surgical history.    Past Medical History:   Diagnosis Date    Anxiety and depression     Hypertension        Family History   Family history unknown: Yes       Social History     Socioeconomic History    Marital status:    Tobacco Use    Smoking status: Never     Passive exposure: Never    Smokeless tobacco: Never   Vaping Use    Vaping status: Never Used   Substance and Sexual Activity    Alcohol use: Never    Drug use: Never    Sexual activity: Defer               Objective:       Physical Exam    /95 (BP Location: Left arm, Patient Position: Sitting, Cuff Size: Large Adult)   Pulse 69   Ht 175.3 cm (69\")   Wt 99.8 kg (220 lb)   SpO2 97%   BMI 32.49 kg/m²   The patient is alert, oriented and in no distress.    Vital signs as noted above.    Head and neck revealed no carotid bruits or jugular venous distension.  No thyromegaly or lymphadenopathy is present.    Lungs clear.  No wheezing.  Breath sounds are normal bilaterally.    Heart normal first and second heart sounds.  No murmur..  No pericardial rub is present.  No gallop is present.    Abdomen soft and nontender.  No organomegaly is present.    Extremities revealed good peripheral pulses without any pedal edema.    Skin warm and dry.    Musculoskeletal system is grossly normal.    CNS grossly normal.          No diagnosis found.LAB RESULTS (LAST 7 DAYS)    CBC        BMP        CMP         BNP        TROPONIN        CoAg        Creatinine Clearance  CrCl cannot be calculated (Patient's most recent lab result is older than the maximum 30 days allowed.).    ABG   "      Radiology  No radiology results for the last day    EKG  Procedures    Stress test      Echocardiogram  Results for orders placed during the hospital encounter of 11/15/24    Adult Transthoracic Echo Complete W/ Cont if Necessary Per Protocol    Interpretation Summary    Left ventricular systolic function is hyperdynamic (EF > 70%). Calculated left ventricular EF = 72.4%    Left ventricular wall thickness is consistent with mild to moderate concentric hypertrophy.    Left ventricular diastolic function is consistent with (grade I) impaired relaxation.    The left atrial cavity is mild to moderately dilated.    Transthoracic echocardiography reveals mild to moderate LVH with EF of 70%  The anterior leaflet of mitral valve is thickened particularly at the tips with calcification and the differential diagnosis can be an old vegetation which is calcified versus calcific disease of the mitral valve and differential diagnosis can also be for rheumatic disease and mild to moderate eccentric MR directed posteriorly and laterally.  No effusion    Electronically signed by Troy Leger MD, 11/17/24, 3:17 PM EST.      Cardiac catheterization  No results found for this or any previous visit.          Assessment and Plan       There are no diagnoses linked to this encounter.       HFpEF  Echocardiogram shows preserved LV function with mild to moderate mitral regurgitation.  Grade 1 diastolic dysfunction.  HFpEF is likely secondary to hypertension, mitral regurgitation, obesity  proBNP more than 2000.  Continue losartan, Jardiance  Coreg was discontinued and clonidine was added by PCP  Obtain a nuclear stress test due to symptoms of shortness of breath, angina equivalent    Cardiac murmur  Echocardiogram showed calcified mitral valve leaflet  Mild to moderate MR  Currently euvolemic     Chronic kidney disease  Most recent creatinine 1, GFR is 73.6  Tolerating losartan, hydrochlorothiazide, Jardiance and Aldactone      Hypertension  Currently on amlodipine, losartan, Aldactone, hydrochlorothiazide and clonidine  Previously on Coreg  Blood pressure and heart rate are well-controlled  Whitecoat hypertension    LDL 78, HDL 32, triglyceride 51 and total cholesterol 122.  A1c is 5.0 to  Currently no indication for statin     Cellulitis  Completed antibiotics  Cellulitis has completely resolved     Anxiety/depression.  Patient is on quetiapine     Obesity  BMI is 32.5.  Patient weighs 220 pounds.  He has gained 20 pounds since hospital admission  Lifestyle modifications discussed with the patient.  Dietary changes and exercise discussed with the patient.  Screening and treatment for sleep apnea recommended

## 2025-03-24 ENCOUNTER — HOSPITAL ENCOUNTER (OUTPATIENT)
Dept: CARDIOLOGY | Facility: HOSPITAL | Age: 75
Discharge: HOME | End: 2025-03-24
Payer: MEDICARE

## 2025-03-24 LAB
BH CV REST NUCLEAR ISOTOPE DOSE: 10.1 MCI
BH CV STRESS BP STAGE 1: NORMAL
BH CV STRESS COMMENTS STAGE 1: NORMAL
BH CV STRESS DOSE REGADENOSON STAGE 1: 0.4
BH CV STRESS DURATION MIN STAGE 1: 0
BH CV STRESS DURATION SEC STAGE 1: 10
BH CV STRESS HR STAGE 1: 63
BH CV STRESS NUCLEAR ISOTOPE DOSE: 32.3 MCI
BH CV STRESS PROTOCOL 1: NORMAL
BH CV STRESS RECOVERY BP: NORMAL MMHG
BH CV STRESS RECOVERY HR: 81 BPM
BH CV STRESS STAGE 1: 1
MAXIMAL PREDICTED HEART RATE: 143 BPM
SPECT HRT GATED+EF W RNC IV: 60 %
STRESS BASELINE BP: NORMAL MMHG
STRESS BASELINE HR: 63 BPM
STRESS TARGET HR: 122 BPM

## 2025-03-24 PROCEDURE — 25010000002 REGADENOSON 0.4 MG/5ML SOLUTION: Performed by: INTERNAL MEDICINE

## 2025-03-24 PROCEDURE — 93017 CV STRESS TEST TRACING ONLY: CPT

## 2025-03-24 PROCEDURE — 93018 CV STRESS TEST I&R ONLY: CPT | Performed by: INTERNAL MEDICINE

## 2025-03-24 PROCEDURE — 78452 HT MUSCLE IMAGE SPECT MULT: CPT

## 2025-03-24 PROCEDURE — A9500 TC99M SESTAMIBI: HCPCS | Performed by: INTERNAL MEDICINE

## 2025-03-24 PROCEDURE — 34310000005 TECHNETIUM SESTAMIBI: Performed by: INTERNAL MEDICINE

## 2025-03-24 PROCEDURE — 93016 CV STRESS TEST SUPVJ ONLY: CPT | Performed by: INTERNAL MEDICINE

## 2025-03-24 PROCEDURE — 78452 HT MUSCLE IMAGE SPECT MULT: CPT | Performed by: INTERNAL MEDICINE

## 2025-03-24 RX ORDER — REGADENOSON 0.08 MG/ML
0.4 INJECTION, SOLUTION INTRAVENOUS
Status: COMPLETED | OUTPATIENT
Start: 2025-03-24 | End: 2025-03-24

## 2025-03-24 RX ADMIN — TECHNETIUM TC 99M SESTAMIBI 1 DOSE: 1 INJECTION INTRAVENOUS at 08:42

## 2025-03-24 RX ADMIN — TECHNETIUM TC 99M SESTAMIBI 1 DOSE: 1 INJECTION INTRAVENOUS at 10:34

## 2025-03-24 RX ADMIN — REGADENOSON 0.4 MG: 0.08 INJECTION, SOLUTION INTRAVENOUS at 10:34

## 2025-04-15 PROCEDURE — 81003 URINALYSIS AUTO W/O SCOPE: CPT | Performed by: FAMILY MEDICINE

## 2025-05-30 ENCOUNTER — OFFICE VISIT (OUTPATIENT)
Age: 75
End: 2025-05-30
Payer: MEDICARE

## 2025-05-30 VITALS — HEIGHT: 69 IN | OXYGEN SATURATION: 95 % | WEIGHT: 220 LBS | HEART RATE: 62 BPM | BODY MASS INDEX: 32.58 KG/M2

## 2025-05-30 DIAGNOSIS — M79.674 PAIN IN TOES OF BOTH FEET: ICD-10-CM

## 2025-05-30 DIAGNOSIS — G62.9 NEUROPATHY: ICD-10-CM

## 2025-05-30 DIAGNOSIS — M79.675 PAIN IN TOES OF BOTH FEET: ICD-10-CM

## 2025-05-30 DIAGNOSIS — M21.619 BUNION: ICD-10-CM

## 2025-05-30 DIAGNOSIS — R26.81 UNSTEADINESS ON FEET: ICD-10-CM

## 2025-05-30 DIAGNOSIS — I87.303 CHRONIC VENOUS HYPERTENSION WITHOUT COMPLICATIONS, BILATERAL: ICD-10-CM

## 2025-05-30 DIAGNOSIS — B35.1 ONYCHOMYCOSIS: Primary | ICD-10-CM

## 2025-05-30 NOTE — PROGRESS NOTES
05/30/2025  Foot and Ankle Surgery - Established Patient/Follow-up  Provider: ABDIEL Rankin   Location: [facility]    Subjective:  Roger Nguyen is a 75 y.o. male.     Chief Complaint   Patient presents with    Left Foot - Cellulitis, Follow-up    Right Foot - Cellulitis, Follow-up     Worse on the rt    Follow-Up     Corry Stewart MD  1/10/25       History of Present Illness  The patient is a 75-year-old male who presents for a routine foot check and nail care.    He reports that his feet are in relatively good condition, with no signs of cellulitis. Typically, he applies Aquaphor and wears compression socks but opted for regular socks today post-shower. He has not been able to trim his nails since the last visit due to limited reach. He is not currently on any anticoagulant therapy. He maintains a healthy diet rich in vegetables and ensures adequate hydration. He is on blood pressure medication.    SOCIAL HISTORY  Diet: Consumes a lot of good foods, including vegetables.       No Known Allergies    Current Outpatient Medications on File Prior to Visit   Medication Sig Dispense Refill    amLODIPine (NORVASC) 10 MG tablet Take 1 tablet by mouth Daily.      buPROPion SR (Wellbutrin SR) 150 MG 12 hr tablet Take 1 tablet by mouth Daily. Indications: Major Depressive Disorder      busPIRone (BUSPAR) 5 MG tablet Take 3 tablets by mouth 2 (Two) Times a Day. Indications: Anxiety Disorder      cloNIDine (CATAPRES) 0.2 MG tablet Take 0.5 tablets by mouth 2 (Two) Times a Day. Indications: High Blood Pressure      empagliflozin (Jardiance) 10 MG tablet tablet Take 1 tablet by mouth Daily.      losartan-hydrochlorothiazide (HYZAAR) 50-12.5 MG per tablet Take 1 tablet by mouth Daily.      Misc Natural Products (PROSTATE HEALTH PO) Take 1 capsule by mouth Daily. Indications:      spironolactone (ALDACTONE) 50 MG tablet Take 1 tablet by mouth Daily. Indications: Edema      tamsulosin (FLOMAX) 0.4 MG  "capsule 24 hr capsule       losartan (COZAAR) 25 MG tablet Take 2 tablets by mouth Daily for 60 days. (Patient taking differently: Take 4 tablets by mouth Daily. Indications: High Blood Pressure) 60 tablet 1     No current facility-administered medications on file prior to visit.       Objective   Pulse 62   Ht 175.3 cm (69\")   Wt 99.8 kg (220 lb)   SpO2 95%   BMI 32.49 kg/m²     Foot/Ankle Exam    GENERAL  Appearance:  elderly  Orientation:  AAOx3  Affect:  appropriate  Gait:  steppage  Assistance:  assistance by care giver  Right shoe gear: casual shoe and sock  Left shoe gear: casual shoe and sock     VASCULAR      Right Foot Vascularity   Dorsalis pedis:  2+  Skin temperature:  warm  Edema grading:  Trace  CFT:  < 3 seconds  Pedal hair growth:  Absent  Varicosities:  mild varicosities      Left Foot Vascularity   Dorsalis pedis:  2+  Skin temperature:  warm  Edema grading:  Trace  CFT:  < 3 seconds  Pedal hair growth:  Absent  Varicosities:  mild varicosities     NEUROLOGIC      Right Foot Neurologic   Light touch sensation: normal  Protective Sensation using [place] Monofilament:   Sites intact: 9  Sites tested: 10      Left Foot Neurologic   Light touch sensation: normal  Protective Sensation using [place] Monofilament:   Sites intact: 9  Sites tested: 10     MUSCULOSKELETAL      Right Foot Musculoskeletal   Ecchymosis:  none  Tenderness:  toenail problem    Arch:  Pes planus  Hallux valgus: Yes        Left Foot Musculoskeletal   Ecchymosis:  none  Tenderness:  toenail problem  Arch:  Pes planus  Hallux valgus: Yes       DERMATOLOGIC       Right Foot Dermatologic   Skin  Positive for dryness and skin changes.   Nails  1.  Positive for elongated, onychomycosis, abnormal thickness and dystrophic nail.  2.  Positive for elongated, onychomycosis, abnormal thickness and dystrophic nail.  3.  Positive for elongated, onychomycosis, abnormal thickness and dystrophic nail.  4.  Positive for " elongated, onychomycosis, abnormal thickness and dystrophic nail.  5.  Positive for elongated, onychomycosis and abnormal thickness.      Left Foot Dermatologic   Skin  Positive for dryness and skin changes.   Nails  1.  Positive for elongated, onychomycosis, abnormal thickness and dystrophic nail.  2.  Positive for elongated, onychomycosis, abnormal thickness and dystrophic nail.  3.  Positive for elongated, onychomycosis, abnormal thickness and dystrophic nail.  4.  Positive for elongated, onychomycosis, abnormally thick and dystrophic nail.  5.  Positive for elongated, onychomycosis, abnormally thick and dystrophic nail.     Physical Exam  Integumentary: The skin on the feet is in good condition.    Musculoskeletal:  Feet: Complicated structure with bunions and flatfoot.  Toenails: Toenails are in good condition.    Others: Nails debrided today x 10.       Results       Assessment & Plan   Diagnoses and all orders for this visit:    1. Onychomycosis (Primary)    2. Neuropathy    3. Chronic venous hypertension without complications, bilateral    4. Pain in toes of both feet    5. Bunion    6. Unsteadiness on feet      Assessment & Plan  1. Routine diabetic foot examination and nail care:    Nails were debrided today x10. Betadine was applied to the right third toe, followed by a Band-Aid. Monitor the area closely and remove the Band-Aid the following day. Contact the clinic if any abnormalities are observed in the right 3rd toe toe nick does not heal/ or has signs of infection. Compression stockings have been beneficial.     Nail debridement: Both feet x10    Consent and time out was performed before proceeding with the procedure. Nails were debrided with a nail nipper without complication.  No anesthesia was required.  Indications for procedure were thickened, dystrophic, and fungal appearing nails which are difficult to trim.  Proper self-care and technique was discussed with patient.  Patient was stable after  procedure.     Follow-up  Follow up in 4 months.    PROCEDURE  Nails were trimmed today in the office.       No orders of the defined types were placed in this encounter.         Patient or patient representative verbalized consent for the use of Ambient Listening during the visit with  ABDIEL Moyer for chart documentation. 5/30/2025  10:05 EDT